# Patient Record
Sex: MALE | Employment: OTHER | URBAN - METROPOLITAN AREA
[De-identification: names, ages, dates, MRNs, and addresses within clinical notes are randomized per-mention and may not be internally consistent; named-entity substitution may affect disease eponyms.]

---

## 2017-06-30 ENCOUNTER — APPOINTMENT (OUTPATIENT)
Dept: CT IMAGING | Age: 77
DRG: 871 | End: 2017-06-30
Attending: EMERGENCY MEDICINE
Payer: MEDICARE

## 2017-06-30 ENCOUNTER — HOSPITAL ENCOUNTER (EMERGENCY)
Age: 77
Discharge: HOME | DRG: 871 | End: 2017-07-01
Attending: EMERGENCY MEDICINE
Payer: MEDICARE

## 2017-06-30 DIAGNOSIS — K59.01 SLOW TRANSIT CONSTIPATION: Primary | ICD-10-CM

## 2017-06-30 LAB
ALBUMIN SERPL BCP-MCNC: 3.7 G/DL (ref 3.5–5)
ALBUMIN/GLOB SERPL: 1.1 {RATIO} (ref 1.1–2.2)
ALP SERPL-CCNC: 82 U/L (ref 45–117)
ALT SERPL-CCNC: 19 U/L (ref 12–78)
ANION GAP BLD CALC-SCNC: 11 MMOL/L (ref 5–15)
APPEARANCE UR: ABNORMAL
AST SERPL W P-5'-P-CCNC: 21 U/L (ref 15–37)
BACTERIA URNS QL MICRO: ABNORMAL /HPF
BILIRUB SERPL-MCNC: 0.6 MG/DL (ref 0.2–1)
BILIRUB UR QL CFM: NEGATIVE
BUN SERPL-MCNC: 10 MG/DL (ref 6–20)
BUN/CREAT SERPL: 10 (ref 12–20)
CALCIUM SERPL-MCNC: 8.3 MG/DL (ref 8.5–10.1)
CHLORIDE SERPL-SCNC: 97 MMOL/L (ref 97–108)
CO2 SERPL-SCNC: 24 MMOL/L (ref 21–32)
COLOR UR: ABNORMAL
CREAT SERPL-MCNC: 0.97 MG/DL (ref 0.7–1.3)
EPITH CASTS URNS QL MICRO: ABNORMAL /LPF
ERYTHROCYTE [DISTWIDTH] IN BLOOD BY AUTOMATED COUNT: 12.9 % (ref 11.5–14.5)
GLOBULIN SER CALC-MCNC: 3.5 G/DL (ref 2–4)
GLUCOSE SERPL-MCNC: 120 MG/DL (ref 65–100)
GLUCOSE UR STRIP.AUTO-MCNC: NEGATIVE MG/DL
HCT VFR BLD AUTO: 38 % (ref 36.6–50.3)
HGB BLD-MCNC: 13.2 G/DL (ref 12.1–17)
HGB UR QL STRIP: NEGATIVE
KETONES UR QL STRIP.AUTO: 40 MG/DL
LEUKOCYTE ESTERASE UR QL STRIP.AUTO: NEGATIVE
LIPASE SERPL-CCNC: 127 U/L (ref 73–393)
MCH RBC QN AUTO: 33.4 PG (ref 26–34)
MCHC RBC AUTO-ENTMCNC: 34.7 G/DL (ref 30–36.5)
MCV RBC AUTO: 96.2 FL (ref 80–99)
NITRITE UR QL STRIP.AUTO: NEGATIVE
PH UR STRIP: 5.5 [PH] (ref 5–8)
PLATELET # BLD AUTO: 168 K/UL (ref 150–400)
POTASSIUM SERPL-SCNC: 3.1 MMOL/L (ref 3.5–5.1)
PROT SERPL-MCNC: 7.2 G/DL (ref 6.4–8.2)
PROT UR STRIP-MCNC: 30 MG/DL
RBC # BLD AUTO: 3.95 M/UL (ref 4.1–5.7)
RBC #/AREA URNS HPF: ABNORMAL /HPF (ref 0–5)
SODIUM SERPL-SCNC: 132 MMOL/L (ref 136–145)
SP GR UR REFRACTOMETRY: 1.02 (ref 1–1.03)
UA: UC IF INDICATED,UAUC: ABNORMAL
UROBILINOGEN UR QL STRIP.AUTO: 1 EU/DL (ref 0.2–1)
WBC # BLD AUTO: 5.5 K/UL (ref 4.1–11.1)
WBC URNS QL MICRO: ABNORMAL /HPF (ref 0–4)

## 2017-06-30 RX ORDER — MORPHINE SULFATE 4 MG/ML
4 INJECTION, SOLUTION INTRAMUSCULAR; INTRAVENOUS
Status: COMPLETED | OUTPATIENT
Start: 2017-06-30 | End: 2017-06-30

## 2017-06-30 RX ORDER — DILTIAZEM HYDROCHLORIDE 120 MG/1
120 CAPSULE, EXTENDED RELEASE ORAL DAILY
COMMUNITY
End: 2017-07-08

## 2017-06-30 RX ORDER — POTASSIUM CHLORIDE 20 MEQ/1
40 TABLET, EXTENDED RELEASE ORAL
Status: COMPLETED | OUTPATIENT
Start: 2017-06-30 | End: 2017-06-30

## 2017-06-30 RX ORDER — PREGABALIN 100 MG/1
CAPSULE ORAL 2 TIMES DAILY
COMMUNITY
End: 2017-07-19

## 2017-06-30 RX ORDER — SODIUM CHLORIDE 0.9 % (FLUSH) 0.9 %
10 SYRINGE (ML) INJECTION
Status: COMPLETED | OUTPATIENT
Start: 2017-06-30 | End: 2017-07-01

## 2017-06-30 RX ORDER — MAGNESIUM CITRATE
296 SOLUTION, ORAL ORAL
Qty: 1 BOTTLE | Refills: 0 | Status: SHIPPED | OUTPATIENT
Start: 2017-07-01 | End: 2017-07-08

## 2017-06-30 RX ORDER — SODIUM CHLORIDE 9 MG/ML
50 INJECTION, SOLUTION INTRAVENOUS
Status: COMPLETED | OUTPATIENT
Start: 2017-06-30 | End: 2017-07-01

## 2017-06-30 RX ORDER — HYDROCODONE BITARTRATE AND ACETAMINOPHEN 5; 325 MG/1; MG/1
1 TABLET ORAL
Qty: 10 TAB | Refills: 0 | Status: SHIPPED | OUTPATIENT
Start: 2017-06-30

## 2017-06-30 RX ORDER — CIPROFLOXACIN 500 MG/1
500 TABLET ORAL 2 TIMES DAILY
COMMUNITY
End: 2017-07-08

## 2017-06-30 RX ADMIN — MORPHINE SULFATE 4 MG: 4 INJECTION, SOLUTION INTRAMUSCULAR; INTRAVENOUS at 21:53

## 2017-06-30 RX ADMIN — POTASSIUM CHLORIDE 40 MEQ: 1500 TABLET, EXTENDED RELEASE ORAL at 22:42

## 2017-07-01 ENCOUNTER — APPOINTMENT (OUTPATIENT)
Dept: CT IMAGING | Age: 77
DRG: 871 | End: 2017-07-01
Attending: EMERGENCY MEDICINE
Payer: MEDICARE

## 2017-07-01 ENCOUNTER — APPOINTMENT (OUTPATIENT)
Dept: GENERAL RADIOLOGY | Age: 77
DRG: 871 | End: 2017-07-01
Attending: EMERGENCY MEDICINE
Payer: MEDICARE

## 2017-07-01 ENCOUNTER — HOSPITAL ENCOUNTER (INPATIENT)
Age: 77
LOS: 6 days | Discharge: HOME HEALTH | DRG: 871 | End: 2017-07-08
Attending: EMERGENCY MEDICINE | Admitting: INTERNAL MEDICINE
Payer: MEDICARE

## 2017-07-01 VITALS
HEART RATE: 85 BPM | WEIGHT: 197.09 LBS | TEMPERATURE: 97.7 F | DIASTOLIC BLOOD PRESSURE: 65 MMHG | BODY MASS INDEX: 26.7 KG/M2 | OXYGEN SATURATION: 94 % | SYSTOLIC BLOOD PRESSURE: 151 MMHG | HEIGHT: 72 IN | RESPIRATION RATE: 27 BRPM

## 2017-07-01 DIAGNOSIS — K56.2 VOLVULUS (HCC): Primary | ICD-10-CM

## 2017-07-01 DIAGNOSIS — E87.6 HYPOKALEMIA: ICD-10-CM

## 2017-07-01 DIAGNOSIS — J96.01 ACUTE RESPIRATORY FAILURE WITH HYPOXIA (HCC): ICD-10-CM

## 2017-07-01 LAB
ALBUMIN SERPL BCP-MCNC: 3.7 G/DL (ref 3.5–5)
ALBUMIN/GLOB SERPL: 1 {RATIO} (ref 1.1–2.2)
ALP SERPL-CCNC: 88 U/L (ref 45–117)
ALT SERPL-CCNC: 21 U/L (ref 12–78)
ANION GAP BLD CALC-SCNC: 11 MMOL/L (ref 5–15)
AST SERPL W P-5'-P-CCNC: 35 U/L (ref 15–37)
ATRIAL RATE: 86 BPM
BASOPHILS # BLD AUTO: 0 K/UL (ref 0–0.1)
BASOPHILS # BLD: 0 % (ref 0–1)
BILIRUB SERPL-MCNC: 0.7 MG/DL (ref 0.2–1)
BUN SERPL-MCNC: 18 MG/DL (ref 6–20)
BUN/CREAT SERPL: 16 (ref 12–20)
CALCIUM SERPL-MCNC: 8.3 MG/DL (ref 8.5–10.1)
CALCULATED P AXIS, ECG09: 86 DEGREES
CALCULATED R AXIS, ECG10: 7 DEGREES
CALCULATED T AXIS, ECG11: 121 DEGREES
CHLORIDE SERPL-SCNC: 101 MMOL/L (ref 97–108)
CO2 SERPL-SCNC: 23 MMOL/L (ref 21–32)
CREAT SERPL-MCNC: 1.11 MG/DL (ref 0.7–1.3)
DIAGNOSIS, 93000: NORMAL
EOSINOPHIL # BLD: 0 K/UL (ref 0–0.4)
EOSINOPHIL NFR BLD: 0 % (ref 0–7)
ERYTHROCYTE [DISTWIDTH] IN BLOOD BY AUTOMATED COUNT: 13.1 % (ref 11.5–14.5)
GLOBULIN SER CALC-MCNC: 3.6 G/DL (ref 2–4)
GLUCOSE SERPL-MCNC: 180 MG/DL (ref 65–100)
HCT VFR BLD AUTO: 40.8 % (ref 36.6–50.3)
HGB BLD-MCNC: 14.4 G/DL (ref 12.1–17)
LACTATE SERPL-SCNC: 2.4 MMOL/L (ref 0.4–2)
LIPASE SERPL-CCNC: 81 U/L (ref 73–393)
LYMPHOCYTES # BLD AUTO: 6 % (ref 12–49)
LYMPHOCYTES # BLD: 1 K/UL (ref 0.8–3.5)
MCH RBC QN AUTO: 33.6 PG (ref 26–34)
MCHC RBC AUTO-ENTMCNC: 35.3 G/DL (ref 30–36.5)
MCV RBC AUTO: 95.3 FL (ref 80–99)
MONOCYTES # BLD: 0.9 K/UL (ref 0–1)
MONOCYTES NFR BLD AUTO: 6 % (ref 5–13)
NEUTS SEG # BLD: 14.1 K/UL (ref 1.8–8)
NEUTS SEG NFR BLD AUTO: 88 % (ref 32–75)
P-R INTERVAL, ECG05: 218 MS
PLATELET # BLD AUTO: 232 K/UL (ref 150–400)
POTASSIUM SERPL-SCNC: 2.8 MMOL/L (ref 3.5–5.1)
PROT SERPL-MCNC: 7.3 G/DL (ref 6.4–8.2)
Q-T INTERVAL, ECG07: 434 MS
QRS DURATION, ECG06: 160 MS
QTC CALCULATION (BEZET), ECG08: 519 MS
RBC # BLD AUTO: 4.28 M/UL (ref 4.1–5.7)
SODIUM SERPL-SCNC: 135 MMOL/L (ref 136–145)
VENTRICULAR RATE, ECG03: 86 BPM
WBC # BLD AUTO: 16 K/UL (ref 4.1–11.1)

## 2017-07-01 PROCEDURE — 77030008683 HC TU ET CUF COVD -A

## 2017-07-01 PROCEDURE — 77030005514 HC CATH URETH FOL14 BARD -A

## 2017-07-01 PROCEDURE — 87040 BLOOD CULTURE FOR BACTERIA: CPT | Performed by: EMERGENCY MEDICINE

## 2017-07-01 PROCEDURE — 36415 COLL VENOUS BLD VENIPUNCTURE: CPT | Performed by: EMERGENCY MEDICINE

## 2017-07-01 PROCEDURE — 74022 RADEX COMPL AQT ABD SERIES: CPT

## 2017-07-01 PROCEDURE — 74177 CT ABD & PELVIS W/CONTRAST: CPT

## 2017-07-01 PROCEDURE — 96367 TX/PROPH/DG ADDL SEQ IV INF: CPT

## 2017-07-01 PROCEDURE — 83605 ASSAY OF LACTIC ACID: CPT | Performed by: EMERGENCY MEDICINE

## 2017-07-01 PROCEDURE — 51702 INSERT TEMP BLADDER CATH: CPT

## 2017-07-01 PROCEDURE — 74011250636 HC RX REV CODE- 250/636: Performed by: EMERGENCY MEDICINE

## 2017-07-01 PROCEDURE — 83690 ASSAY OF LIPASE: CPT | Performed by: EMERGENCY MEDICINE

## 2017-07-01 PROCEDURE — 83735 ASSAY OF MAGNESIUM: CPT | Performed by: EMERGENCY MEDICINE

## 2017-07-01 PROCEDURE — 96365 THER/PROPH/DIAG IV INF INIT: CPT

## 2017-07-01 PROCEDURE — 96361 HYDRATE IV INFUSION ADD-ON: CPT

## 2017-07-01 PROCEDURE — 99285 EMERGENCY DEPT VISIT HI MDM: CPT

## 2017-07-01 PROCEDURE — 77030008768 HC TU NG VYGC -A

## 2017-07-01 PROCEDURE — 93005 ELECTROCARDIOGRAM TRACING: CPT

## 2017-07-01 PROCEDURE — 85025 COMPLETE CBC W/AUTO DIFF WBC: CPT | Performed by: EMERGENCY MEDICINE

## 2017-07-01 PROCEDURE — 74011636320 HC RX REV CODE- 636/320: Performed by: EMERGENCY MEDICINE

## 2017-07-01 PROCEDURE — 80053 COMPREHEN METABOLIC PANEL: CPT | Performed by: EMERGENCY MEDICINE

## 2017-07-01 PROCEDURE — 31500 INSERT EMERGENCY AIRWAY: CPT

## 2017-07-01 RX ORDER — SODIUM CHLORIDE 9 MG/ML
50 INJECTION, SOLUTION INTRAVENOUS
Status: COMPLETED | OUTPATIENT
Start: 2017-07-01 | End: 2017-07-02

## 2017-07-01 RX ORDER — MORPHINE SULFATE 4 MG/ML
INJECTION, SOLUTION INTRAMUSCULAR; INTRAVENOUS
Status: COMPLETED
Start: 2017-07-01 | End: 2017-07-01

## 2017-07-01 RX ORDER — MORPHINE SULFATE 4 MG/ML
4 INJECTION, SOLUTION INTRAMUSCULAR; INTRAVENOUS
Status: DISCONTINUED | OUTPATIENT
Start: 2017-07-01 | End: 2017-07-01 | Stop reason: HOSPADM

## 2017-07-01 RX ORDER — POTASSIUM CHLORIDE 7.45 MG/ML
10 INJECTION INTRAVENOUS
Status: DISPENSED | OUTPATIENT
Start: 2017-07-01 | End: 2017-07-02

## 2017-07-01 RX ORDER — SODIUM CHLORIDE 0.9 % (FLUSH) 0.9 %
10 SYRINGE (ML) INJECTION
Status: COMPLETED | OUTPATIENT
Start: 2017-07-01 | End: 2017-07-01

## 2017-07-01 RX ADMIN — Medication 10 ML: at 00:17

## 2017-07-01 RX ADMIN — SODIUM CHLORIDE 1000 ML: 900 INJECTION, SOLUTION INTRAVENOUS at 22:31

## 2017-07-01 RX ADMIN — SODIUM CHLORIDE 50 ML/HR: 900 INJECTION, SOLUTION INTRAVENOUS at 00:17

## 2017-07-01 RX ADMIN — IOPAMIDOL 100 ML: 755 INJECTION, SOLUTION INTRAVENOUS at 23:21

## 2017-07-01 RX ADMIN — MORPHINE SULFATE 4 MG: 4 INJECTION, SOLUTION INTRAMUSCULAR; INTRAVENOUS at 00:15

## 2017-07-01 RX ADMIN — Medication 10 ML: at 23:21

## 2017-07-01 RX ADMIN — IOPAMIDOL 100 ML: 755 INJECTION, SOLUTION INTRAVENOUS at 00:16

## 2017-07-01 RX ADMIN — POTASSIUM CHLORIDE 10 MEQ: 10 INJECTION, SOLUTION INTRAVENOUS at 23:37

## 2017-07-01 RX ADMIN — SODIUM CHLORIDE 50 ML/HR: 900 INJECTION, SOLUTION INTRAVENOUS at 23:21

## 2017-07-01 RX ADMIN — SODIUM CHLORIDE 1682 ML: 900 INJECTION, SOLUTION INTRAVENOUS at 23:43

## 2017-07-01 NOTE — IP AVS SNAPSHOT
[address] [facility] 
[phone] Patient: Narendra Narayanan MRN: [id number] IB You are allergic to the following Allergen Reactions Augmentin (Amoxicillin-Pot Clavulanate) Rash Ciprofloxacin Rash Recent Documentation Height Weight BMI Smoking Status 1.829 m 88.7 kg 26.52 kg/m2 Never Smoker Emergency Contacts Name Discharge Info Relation Home Work Mobile Abhi Reina [3] [phone] About your hospitalization You were admitted on:  2017 You last received care in the:  [facility] CARDIOPULMONARY CARE You were discharged on:  2017 Unit phone number:  [phone] Why you were hospitalized Your primary diagnosis was:  Sepsis Due To Pneumonia (Hcc) Your diagnoses also included:  Respiratory Failure (Hcc), E-Coli Uti, Sigmoid Volvulus (Hcc), A-Fib (Hcc), Electrolyte Abnormality, Hypertension Providers Seen During Your Hospitalizations Provider Role Specialty Primary office phone Katie Iqbal DO Attending Provider Emergency Medicine [phone] Kaya Tadeo MD Attending Provider Internal Medicine [phone] María Spence MD Attending Provider Internal Medicine [phone]  
 Stephane March MD Attending Provider Hospitalist [phone] Your Primary Care Physician (PCP) Primary Care Physician Office Phone Office Fax Anne Tita [phone] Follow-up Information Follow up With Details Comments Contact Info Juancarlos Vidal MD On 2017 12:45pm  hospital follow-up [address] [place] [address] [address] [place] [address] 
[phone] Will Askew MD On 7/10/2017 8:30am  hospital follow-up Katalina Navdeep[address] [place] [address] 
[phone] [address] [place] [address] [address] 
1st Floor [place] [address] 
[phone] [facility]   [address] [facility] [address] [place] [address] 
[phone] Lane Cedillo NP On 7/17/2017 8:45am  hospital follow-up-Cardiologist [address] [facility] 
[phone] Edmond Soto MD Schedule an appointment as soon as possible for a visit to be seen within 2 weeks [address] [facility] 
[phone] Joseph Grier MD Schedule an appointment as soon as possible for a visit to be seen within 1-2 weeks 1808 [facility] [address] [place] [address] 
[phone] Current Discharge Medication List  
  
START taking these medications Dose & Instructions Dispensing Information Comments Morning Noon Evening Bedtime  
 amiodarone 200 mg tablet Commonly known as:  CORDARONE Your last dose was: Your next dose is:    
   
   
 Dose:  200 mg Take 1 Tab by mouth two (2) times a day for 14 days. Then, starting on 7/23, decrease dose to 200 mg by mouth daily in the morning for 14 more days. (28 days total therapy) Quantity:  42 Tab Refills:  0  
     
   
   
   
  
 benzonatate 100 mg capsule Commonly known as:  TESSALON Your last dose was: Your next dose is:    
   
   
 Dose:  100 mg Take 1 Cap by mouth every eight (8) hours as needed for Cough for up to 7 days. Quantity:  15 Cap Refills:  0  
     
   
   
   
  
 doxycycline 100 mg tablet Commonly known as:  VIBRA-TABS Your last dose was: Your next dose is:    
   
   
 Dose:  100 mg Take 1 Tab by mouth every twelve (12) hours. Quantity:  4 Tab Refills:  0  
     
   
   
   
  
 guaiFENesin-dextromethorphan 100-10 mg/5 mL syrup Commonly known as:  ROBITUSSIN DM Your last dose was: Your next dose is:    
   
   
 Dose:  10 mL Take 10 mL by mouth every six (6) hours as needed for up to 10 days. Quantity:  1 Bottle Refills:  0  
     
   
   
   
  
 l.acidoph-B.lactis-B.longum 460 mg (7.5-6- 1.5 bill. cell) Cap cap Commonly known as:  [id number] Your last dose was: Your next dose is:    
   
   
 Dose:  1 Cap Take 1 Cap by mouth Daily (before breakfast). Quantity:  2 Cap Refills:  0  
     
   
   
   
  
 linaclotide 145 mcg Cap capsule Commonly known as:  Gio Roof Your last dose was: Your next dose is:    
   
   
 Dose:  145 mcg Take 1 Cap by mouth Daily (before breakfast). Quantity:  30 Cap Refills:  0  
     
   
   
   
  
 polyethylene glycol 17 gram packet Commonly known as:  Alecia Royalty Your last dose was: Your next dose is:    
   
   
 Dose:  17 g Take 1 Packet by mouth daily. Quantity:  30 Packet Refills:  0 CONTINUE these medications which have CHANGED Dose & Instructions Dispensing Information Comments Morning Noon Evening Bedtime  
 metoprolol succinate 50 mg XL tablet Commonly known as:  TOPROL-XL What changed:   
- how much to take - when to take this Your last dose was: Your next dose is:    
   
   
 Dose:  25 mg Take 0.5 Tabs by mouth two (2) times a day for 30 days. Quantity:  30 Tab Refills:  2 CONTINUE these medications which have NOT CHANGED Dose & Instructions Dispensing Information Comments Morning Noon Evening Bedtime  
 aspirin 81 mg chewable tablet Your last dose was: Your next dose is:    
   
   
 Dose:  81 mg Take 81 mg by mouth daily. Refills:  0  
     
   
   
   
  
 finasteride 5 mg tablet Commonly known as:  PROSCAR Your last dose was: Your next dose is:    
   
   
 Dose:  5 mg Take 5 mg by mouth daily. Refills:  0  
     
   
   
   
  
 FLOMAX 0.4 mg capsule Generic drug:  tamsulosin Your last dose was:     
   
Your next dose is:    
   
   
 Dose:  0.4 mg  
 Take 0.4 mg by mouth daily. Refills:  0 HYDROcodone-acetaminophen 5-325 mg per tablet Commonly known as:  Buffy Iron Your last dose was: Your next dose is:    
   
   
 Dose:  1 Tab Take 1 Tab by mouth every six (6) hours as needed for Pain. Max Daily Amount: 4 Tabs. Quantity:  10 Tab Refills:  0 LORazepam 1 mg tablet Commonly known as:  ATIVAN Your last dose was: Your next dose is: Take  by mouth every four (4) hours as needed. Refills:  0 LYRICA 100 mg capsule Generic drug:  pregabalin Your last dose was: Your next dose is: Take  by mouth two (2) times a day. Refills:  0 PriLOSEC 20 mg capsule Generic drug:  omeprazole Your last dose was: Your next dose is:    
   
   
 Dose:  20 mg Take 20 mg by mouth daily. Refills:  0 STOP taking these medications   
 amLODIPine 5 mg tablet Commonly known as:  Marin Cordial CIPRO 500 mg tablet Generic drug:  ciprofloxacin HCl  
   
  
 dilTIAZem  mg XR capsule Commonly known as:  DILACOR XR  
   
  
 magnesium citrate solution  
   
  
 predniSONE 20 mg tablet Commonly known as:  Lopez Aver Where to Get Your Medications Information on where to get these meds will be given to you by the nurse or doctor. ! Ask your nurse or doctor about these medications  
  amiodarone 200 mg tablet  
 benzonatate 100 mg capsule  
 doxycycline 100 mg tablet  
 guaiFENesin-dextromethorphan 100-10 mg/5 mL syrup  
 l.acidoph-B.lactis-B.longum 460 mg (7.5-6- 1.5 bill. cell) Cap cap  
 linaclotide 145 mcg Cap capsule  
 metoprolol succinate 50 mg XL tablet  
 polyethylene glycol 17 gram packet Discharge Instructions Patient Discharge Instructions Pt Name  Bijal Eugene Date of Birth 1940 Age  78 y.o. Medical Record Number  [id number] PCP Malaika Rebollar MD   
Admit date:  2017 @    [facility] Room Number  2203/01 Date of Discharge 2017 Admission Diagnoses:     Sepsis due to pneumonia ([facility] Allergies Allergen Reactions  Augmentin [Amoxicillin-Pot Clavulanate] Rash  Ciprofloxacin Rash You were admitted to [address] [place] [address] for  Sepsis due to pneumonia [facility]) YOUR OTHER MEDICAL DIAGNOSES INCLUDE (BUT NOT LIMITED TO ): 
Present on Admission:  Respiratory failure ([facility]  Sepsis due to pneumonia ([facility]  E-coli UTI  Sigmoid volvulus ([facility]  [facility]  Electrolyte abnormality  Hypertension MyChart Activation Thank you for requesting access to Forter. Please follow the instructions below to securely access and download your online medical record. Forter allows you to send messages to your doctor, view your test results, renew your prescriptions, schedule appointments, and more. How Do I Sign Up? 1. In your internet browser, go to [email or web address] 
2. Click on the First Time User? Click Here link in the Sign In box. You will be redirect to the New Member Sign Up page. 3. Enter your Forter Access Code exactly as it appears below. You will not need to use this code after youve completed the sign-up process. If you do not sign up before the expiration date, you must request a new code. Forter Access Code: [email or web address] Expires: 2017 11:57 PM (This is the date your Forter access code will ) 4. Enter the last four digits of your Social Security Number (xxxx) and Date of Birth (mm/dd/yyyy) as indicated and click Submit. You will be taken to the next sign-up page. 5. Create a Forter ID. This will be your Forter login ID and cannot be changed, so think of one that is secure and easy to remember. 6. Create a Forter password. You can change your password at any time. 7. Enter your Password Reset Question and Answer. This can be used at a later time if you forget your password. 8. Enter your e-mail address. You will receive e-mail notification when new information is available in [address] 9. Click Sign Up. You can now view and download portions of your medical record. 10. Click the Download Summary menu link to download a portable copy of your medical information. Additional Information If you have questions, please visit the Frequently Asked Questions section of the [employer] website at [email or web address] Remember, [employer] is NOT to be used for urgent needs. For medical emergencies, dial 911. DIET:  Cardiac Diet Recommended activity: Activity as tolerated Follow up : Follow-up Information Follow up With Details Comments Contact Info Rachel Langford MD On 7/11/2017 12:45pm  [facility] [place] [address] [address] [place] [address] 
[phone] Jorge Alfonso MD On 7/10/2017 8:30am  hospital follow-up Katalina [address] [facility] 
[phone] [address] [place] [address]   [address] 
1st Floor [facility] [address] 
[phone] [facility]   1800 [facility] 3 [facility] [address] 
[phone] Chaya Saunders NP On 7/17/2017 8:45am  hospital follow-up-Cardiologist [address] [facility] 
[phone] Vern Shelton MD Schedule an appointment as soon as possible for a visit to be seen within 2 weeks [address] [facility] 
[phone] Ambreen Ghotra MD Schedule an appointment as soon as possible for a visit to be seen within 1-2 weeks 1808 [facility] [address] [place] [address] 
[phone] · It is important that you take the medication exactly as they are prescribed. · Keep your medication in the bottles provided by the pharmacist and keep a list of the medication names, dosages, and times to be taken in your wallet. · Do not take other medications without consulting your doctor. ADDITIONAL INFORMATION: If you experience any of the following symptoms or have any health problem not listed below, then please call your primary care physician or return to the emergency room if you cannot get hold of your doctor: Fever, chills, nausea, vomiting, diarrhea, change in mentation, falling, bleeding, shortness of breath. I understand that if any problems occur once I am discharged, I am supposed to call my Primary care physician for further care or seek help in the Emergency Department at the nearest Healthcare facility. I have had an opportunity to discuss my clinical issues with my doctor and nursing staff. I understand and acknowledge receipt of the above instructions. Physician's or R.N.'s Signature                                                            Date/Time Patient or Representative Signature                                                 Date/Time Discharge Orders None [facility] Announcement We are excited to announce that we are making your provider's discharge notes available to you in Ciplex. You will see these notes when they are completed and signed by the physician that discharged you from your recent hospital stay.   If you have any questions or concerns about any information you see in Sportskeedat, please call the Health Information Department where you were seen or reach out to your Primary Care Provider for more information about your plan of care. Introducing [facility]! Shirlene Edwards introduces theScore patient portal. Now you can access parts of your medical record, email your doctor's office, and request medication refills online. 1. In your internet browser, go to [email or web address] 2. Click on the First Time User? Click Here link in the Sign In box. You will see the New Member Sign Up page. 3. Enter your theScore Access Code exactly as it appears below. You will not need to use this code after youve completed the sign-up process. If you do not sign up before the expiration date, you must request a new code. · theScore Access Code: [email or web address] Expires: 9/28/2017 11:57 PM 
 
4. Enter the last four digits of your Social Security Number (xxxx) and Date of Birth (mm/dd/yyyy) as indicated and click Submit. You will be taken to the next sign-up page. 5. Create a theScore ID. This will be your theScore login ID and cannot be changed, so think of one that is secure and easy to remember. 6. Create a theScore password. You can change your password at any time. 7. Enter your Password Reset Question and Answer. This can be used at a later time if you forget your password. 8. Enter your e-mail address. You will receive e-mail notification when new information is available in [address] 9. Click Sign Up. You can now view and download portions of your medical record. 10. Click the Download Summary menu link to download a portable copy of your medical information. If you have questions, please visit the Frequently Asked Questions section of the theScore website. Remember, theScore is NOT to be used for urgent needs. For medical emergencies, dial 911. Now available from your iPhone and Android! General Information Please provide this summary of care documentation to your next provider.  
  
  
    
    
 Patient Signature: ____________________________________________________________ Date:  ____________________________________________________________  
  
Michele Matsu Provider Signature:  ____________________________________________________________ Date:  ____________________________________________________________

## 2017-07-01 NOTE — IP AVS SNAPSHOT
Current Discharge Medication List  
  
START taking these medications Dose & Instructions Dispensing Information Comments Morning Noon Evening Bedtime  
 amiodarone 200 mg tablet Commonly known as:  CORDARONE Your last dose was: Your next dose is:    
   
   
 Dose:  200 mg Take 1 Tab by mouth two (2) times a day for 14 days. Then, starting on 7/23, decrease dose to 200 mg by mouth daily in the morning for 14 more days. (28 days total therapy) Quantity:  42 Tab Refills:  0  
     
   
   
   
  
 benzonatate 100 mg capsule Commonly known as:  TESSALON Your last dose was: Your next dose is:    
   
   
 Dose:  100 mg Take 1 Cap by mouth every eight (8) hours as needed for Cough for up to 7 days. Quantity:  15 Cap Refills:  0  
     
   
   
   
  
 doxycycline 100 mg tablet Commonly known as:  VIBRA-TABS Your last dose was: Your next dose is:    
   
   
 Dose:  100 mg Take 1 Tab by mouth every twelve (12) hours. Quantity:  4 Tab Refills:  0  
     
   
   
   
  
 guaiFENesin-dextromethorphan 100-10 mg/5 mL syrup Commonly known as:  ROBITUSSIN DM Your last dose was: Your next dose is:    
   
   
 Dose:  10 mL Take 10 mL by mouth every six (6) hours as needed for up to 10 days. Quantity:  1 Bottle Refills:  0  
     
   
   
   
  
 l.acidoph-B.lactis-B.longum 460 mg (7.5-6- 1.5 bill. cell) Cap cap Commonly known as:  [id number] Your last dose was: Your next dose is:    
   
   
 Dose:  1 Cap Take 1 Cap by mouth Daily (before breakfast). Quantity:  2 Cap Refills:  0  
     
   
   
   
  
 linaclotide 145 mcg Cap capsule Commonly known as:  Marisol Oliver Your last dose was: Your next dose is:    
   
   
 Dose:  145 mcg Take 1 Cap by mouth Daily (before breakfast). Quantity:  30 Cap Refills:  0  
     
   
   
   
  
 polyethylene glycol 17 gram packet Commonly known as:  Ambrose Gonzalez Your last dose was: Your next dose is:    
   
   
 Dose:  17 g Take 1 Packet by mouth daily. Quantity:  30 Packet Refills:  0 CONTINUE these medications which have CHANGED Dose & Instructions Dispensing Information Comments Morning Noon Evening Bedtime  
 metoprolol succinate 50 mg XL tablet Commonly known as:  TOPROL-XL What changed:   
- how much to take - when to take this Your last dose was: Your next dose is:    
   
   
 Dose:  25 mg Take 0.5 Tabs by mouth two (2) times a day for 30 days. Quantity:  30 Tab Refills:  2 CONTINUE these medications which have NOT CHANGED Dose & Instructions Dispensing Information Comments Morning Noon Evening Bedtime  
 aspirin 81 mg chewable tablet Your last dose was: Your next dose is:    
   
   
 Dose:  81 mg Take 81 mg by mouth daily. Refills:  0  
     
   
   
   
  
 finasteride 5 mg tablet Commonly known as:  PROSCAR Your last dose was: Your next dose is:    
   
   
 Dose:  5 mg Take 5 mg by mouth daily. Refills:  0  
     
   
   
   
  
 FLOMAX 0.4 mg capsule Generic drug:  tamsulosin Your last dose was: Your next dose is:    
   
   
 Dose:  0.4 mg Take 0.4 mg by mouth daily. Refills:  0 HYDROcodone-acetaminophen 5-325 mg per tablet Commonly known as:  Fredrica Tolentino Your last dose was: Your next dose is:    
   
   
 Dose:  1 Tab Take 1 Tab by mouth every six (6) hours as needed for Pain. Max Daily Amount: 4 Tabs. Quantity:  10 Tab Refills:  0 LORazepam 1 mg tablet Commonly known as:  ATIVAN Your last dose was: Your next dose is: Take  by mouth every four (4) hours as needed. Refills:  0 LYRICA 100 mg capsule Generic drug:  pregabalin Your last dose was: Your next dose is: Take  by mouth two (2) times a day. Refills:  0 PriLOSEC 20 mg capsule Generic drug:  omeprazole Your last dose was: Your next dose is:    
   
   
 Dose:  20 mg Take 20 mg by mouth daily. Refills:  0 STOP taking these medications   
 amLODIPine 5 mg tablet Commonly known as:  Angus Perdue CIPRO 500 mg tablet Generic drug:  ciprofloxacin HCl  
   
  
 dilTIAZem  mg XR capsule Commonly known as:  DILACOR XR  
   
  
 magnesium citrate solution  
   
  
 predniSONE 20 mg tablet Commonly known as:  Serjio Resendiz Where to Get Your Medications Information on where to get these meds will be given to you by the nurse or doctor. ! Ask your nurse or doctor about these medications  
  amiodarone 200 mg tablet  
 benzonatate 100 mg capsule  
 doxycycline 100 mg tablet  
 guaiFENesin-dextromethorphan 100-10 mg/5 mL syrup  
 l.acidoph-B.lactis-B.longum 460 mg (7.5-6- 1.5 bill. cell) Cap cap  
 linaclotide 145 mcg Cap capsule  
 metoprolol succinate 50 mg XL tablet  
 polyethylene glycol 17 gram packet

## 2017-07-01 NOTE — ED NOTES
Care assumed of patient @ this time & intro with rounding done, with report from [facility] JOSE EDUARDO Zhao_________________. Patient resting quietly on stretcher without verbal complaints.

## 2017-07-01 NOTE — ED NOTES
Pt arrives to the ED with c/c of abdominal pain, onset last night. Pt reports last BM today consisting of small streaks. Pt denies any N/V/D. Pts abdomen appears to be firm and distended. Pt also reports SOB and does appear to be dyspneic when resting. Pt alert, oriented X4, able to follow commands and ambulate with assistance. Monitor X3, side rails X2 and call bell within reach.

## 2017-07-01 NOTE — DISCHARGE INSTRUCTIONS
You were seen for decreased bowel movements and abdominal pain, most likely due to constipation. For the constipation, drink plenty of fluids, use Colace stool softener with Dulcolax or Miralax twice a day laxative until bowel movement occurs. If still no bowel movement, can buy Magnesium Citrate over the counter for a whole bowel cleanse. Maintain a high fiber diet by eating green vegetables and fruit and drink 6-8 large glasses of water daily to avoid constipation in the future. Metamucil, 1 tablespoon once or twice daily can be used to keep bowels regular if needed. Timing elimination to occur after meals, or after a hot drink, can also improve the situation long term. Call if symptoms persist or worsen. Constipation: Care Instructions  Your Care Instructions  Constipation means that you have a hard time passing stools (bowel movements). People pass stools from 3 times a day to once every 3 days. What is normal for you may be different. Constipation may occur with pain in the rectum and cramping. The pain may get worse when you try to pass stools. Sometimes there are small amounts of bright red blood on toilet paper or the surface of stools. This is because of enlarged veins near the rectum (hemorrhoids). A few changes in your diet and lifestyle may help you avoid ongoing constipation. Your doctor may also prescribe medicine to help loosen your stool. Some medicines can cause constipation. These include pain medicines and antidepressants. Tell your doctor about all the medicines you take. Your doctor may want to make a medicine change to ease your symptoms. Follow-up care is a key part of your treatment and safety. Be sure to make and go to all appointments, and call your doctor if you are having problems. It's also a good idea to know your test results and keep a list of the medicines you take. How can you care for yourself at home?   · Drink plenty of fluids, enough so that your urine is light yellow or clear like water. If you have kidney, heart, or liver disease and have to limit fluids, talk with your doctor before you increase the amount of fluids you drink. · Include high-fiber foods in your diet each day. These include fruits, vegetables, beans, and whole grains. · Get at least 30 minutes of exercise on most days of the week. Walking is a good choice. You also may want to do other activities, such as running, swimming, cycling, or playing tennis or team sports. · Take a fiber supplement, such as Citrucel or Metamucil, every day. Read and follow all instructions on the label. · Schedule time each day for a bowel movement. A daily routine may help. Take your time having your bowel movement. · Support your feet with a small step stool when you sit on the toilet. This helps flex your hips and places your pelvis in a squatting position. · Your doctor may recommend an over-the-counter laxative to relieve your constipation. Examples are Milk of Magnesia and MiraLax. Read and follow all instructions on the label. Do not use laxatives on a long-term basis. When should you call for help? Call your doctor now or seek immediate medical care if:  · You have new or worse belly pain. · You have new or worse nausea or vomiting. · You have blood in your stools. Watch closely for changes in your health, and be sure to contact your doctor if:  · Your constipation is getting worse. · You do not get better as expected. Where can you learn more? Go to http://ariane-nadira.info/. Enter 21 [phone] in the search box to learn more about \"Constipation: Care Instructions. \"  Current as of: March 20, 2017  Content Version: 11.3  © 0955-8217 [employer]. Care instructions adapted under license by [employer] (which disclaims liability or warranty for this information).  If you have questions about a medical condition or this instruction, always ask your healthcare professional. [employer] disclaims any warranty or liability for your use of this information.

## 2017-07-01 NOTE — ED NOTES
1030: Pts heart rate 100-120. Pt was standing using the urinal. Pts rhythm showed vent rhythm on monitor. Pt has a pacemaker. Ordered EKG and notified MD.  [phone]: Rounded and updated pt and family on plan of care, pt will be going to CT for scan.

## 2017-07-01 NOTE — ED NOTES
Bedside and Verbal shift change report given to Ariana Chung RN (oncoming nurse) by Van Ryder RN (offgoing nurse). Report included the following information SBAR, Kardex and ED Summary.

## 2017-07-01 NOTE — ED NOTES
___Lizzie_____________________ in to talk with patient and explain plan of care with  understanding and  written & verbal instructions

## 2017-07-01 NOTE — ED PROVIDER NOTES
HPI Comments: Nicole Pryor is a 78 y.o. male, pmhx significant for CKD, and pacemaker, who presents ambulatory with wife to the ED c/o sharp 8/10 lower abdominal pain x last night. Pt states that he felt worsening pain since this afternoon. Pt notes associated abdominal distention, and nausea. He states that he has not had a \"good\" bowel movement \"in a long time. \" Pt also states that he has not been passing gas. Pt denies radiation of pain to his back. Pt denies h/o diverticulosis, stating that he has had unremarkable colonoscopies. Pt denies dysuria, hematuria, fever, chills, vomiting, and diarrhea. PCP: Willy Finney MD    PSHx: Significant for orthopaedic (hip replacement)  Social Hx: (-) tobacco, (-) EtOH,  (-) Illicit Drugs    There are no other complaints, changes, or physical findings at this time. The history is provided by the patient. No  was used. Past Medical History:   Diagnosis Date    Chronic kidney disease     Pacemaker 2013       Past Surgical History:   Procedure Laterality Date    HX ORTHOPAEDIC      hip replacement, knee, back         History reviewed. No pertinent family history. Social History     Social History    Marital status:      Spouse name: N/A    Number of children: N/A    Years of education: N/A     Occupational History    Not on file. Social History Main Topics    Smoking status: Never Smoker    Smokeless tobacco: Never Used    Alcohol use Yes      Comment: occationally    Drug use: No    Sexual activity: Not on file     Other Topics Concern    Not on file     Social History Narrative         ALLERGIES: Augmentin [amoxicillin-pot clavulanate] and Ciprofloxacin    Review of Systems   Constitutional: Negative for chills and fever. Respiratory: Negative for cough and shortness of breath. Cardiovascular: Negative for chest pain.    Gastrointestinal: Positive for abdominal distention, abdominal pain, constipation and nausea. Negative for diarrhea and vomiting. Genitourinary: Negative for dysuria and hematuria. Neurological: Negative for weakness and numbness. All other systems reviewed and are negative. Patient Vitals for the past 12 hrs:   Temp Pulse Resp BP SpO2   07/01/17 0001 - 85 27 151/65 94 %   06/30/17 2330 - 87 26 163/67 96 %   06/30/17 2321 - 89 26 156/63 94 %   06/30/17 2225 - - - - 93 %   06/30/17 2145 - 85 24 161/70 96 %   06/30/17 2140 - - - - 97 %   06/30/17 2139 - - - 161/72 -   06/30/17 2044 97.7 °F (36.5 °C) 94 22 129/86 97 %       Physical Exam   Constitutional: He is oriented to person, place, and time. He appears well-developed and well-nourished. HENT:   Head: Normocephalic and atraumatic. Eyes: Conjunctivae and EOM are normal.   Neck: Normal range of motion. Neck supple. Cardiovascular: Normal rate and regular rhythm. Pulmonary/Chest: Effort normal and breath sounds normal. No respiratory distress. Abdominal: Soft. He exhibits distension. There is tenderness. There is no rebound. Tenderness in RLQ and minimal tenderness in LLQ  Abdomen distended, but soft   Musculoskeletal: Normal range of motion. Neurological: He is alert and oriented to person, place, and time. Skin: Skin is warm and dry. Psychiatric: He has a normal mood and affect. Nursing note and vitals reviewed. MDM  Number of Diagnoses or Management Options  Slow transit constipation:   Diagnosis management comments: Patient presents with abdominal pain. DDx: Gastroenteritis, SBO, constipation, appendicitis, colitis, IBD, diverticulitis, mesenteric ischemia, AAA or descending dissection, ACS, ureteral stone. Will get labs and CT Abdomen.         Amount and/or Complexity of Data Reviewed  Clinical lab tests: ordered and reviewed  Tests in the radiology section of CPT®: ordered and reviewed  Tests in the medicine section of CPT®: reviewed and ordered  Review and summarize past medical records: yes  Independent visualization of images, tracings, or specimens: yes    Patient Progress  Patient progress: stable    ED Course       Procedures    EKG interpretation: (Preliminary) 10:34 PM  Rhythm: normal sinus rhythm and 1st degree block; and regular . Rate (approx.): 86; Axis: normal; NV interval: normal; QRS interval: normal ; ST/T wave: normal  Written by Iris Jose ED Scribe, as dictated by Pio Tadeo M.D. Progress Note:  11:57 PM  Discussed workup results/findings, and counseled pt regarding his diagnosis. Pt has been encouraged to follow-up as instructed. All questions have been answered, and pt conveyed understanding. Written by Iris Jose ED Scribe, as dictated by Pio Tadeo M.D.    LABORATORY TESTS:  Recent Results (from the past 12 hour(s))   CBC W/O DIFF    Collection Time: 06/30/17  9:06 PM   Result Value Ref Range    WBC 5.5 4.1 - 11.1 K/uL    RBC 3.95 (L) 4.10 - 5.70 M/uL    HGB 13.2 12.1 - 17.0 g/dL    HCT 38.0 36.6 - 50.3 %    MCV 96.2 80.0 - 99.0 FL    MCH 33.4 26.0 - 34.0 PG    MCHC 34.7 30.0 - 36.5 g/dL    RDW 12.9 11.5 - 14.5 %    PLATELET 650 501 - 917 K/uL   METABOLIC PANEL, COMPREHENSIVE    Collection Time: 06/30/17  9:06 PM   Result Value Ref Range    Sodium 132 (L) 136 - 145 mmol/L    Potassium 3.1 (L) 3.5 - 5.1 mmol/L    Chloride 97 97 - 108 mmol/L    CO2 24 21 - 32 mmol/L    Anion gap 11 5 - 15 mmol/L    Glucose 120 (H) 65 - 100 mg/dL    BUN 10 6 - 20 MG/DL    Creatinine 0.97 0.70 - 1.30 MG/DL    BUN/Creatinine ratio 10 (L) 12 - 20      GFR est AA >60 >60 ml/min/1.73m2    GFR est non-AA >60 >60 ml/min/1.73m2    Calcium 8.3 (L) 8.5 - 10.1 MG/DL    Bilirubin, total 0.6 0.2 - 1.0 MG/DL    ALT (SGPT) 19 12 - 78 U/L    AST (SGOT) 21 15 - 37 U/L    Alk.  phosphatase 82 45 - 117 U/L    Protein, total 7.2 6.4 - 8.2 g/dL    Albumin 3.7 3.5 - 5.0 g/dL    Globulin 3.5 2.0 - 4.0 g/dL    A-G Ratio 1.1 1.1 - 2.2     LIPASE    Collection Time: 06/30/17  9:06 PM   Result Value Ref Range    Lipase 127 73 - 393 U/L   URINALYSIS W/ REFLEX CULTURE    Collection Time: 06/30/17 10:32 PM   Result Value Ref Range    Color YELLOW/STRAW      Appearance CLOUDY (A) CLEAR      Specific gravity 1.022 1.003 - 1.030      pH (UA) 5.5 5.0 - 8.0      Protein 30 (A) NEG mg/dL    Glucose NEGATIVE  NEG mg/dL    Ketone 40 (A) NEG mg/dL    Blood NEGATIVE  NEG      Urobilinogen 1.0 0.2 - 1.0 EU/dL    Nitrites NEGATIVE  NEG      Leukocyte Esterase NEGATIVE  NEG      WBC 5-10 0 - 4 /hpf    RBC 5-10 0 - 5 /hpf    Epithelial cells FEW FEW /lpf    Bacteria 1+ (A) NEG /hpf    UA:UC IF INDICATED URINE CULTURE ORDERED (A) CNI     BILIRUBIN, CONFIRM    Collection Time: 06/30/17 10:32 PM   Result Value Ref Range    Bilirubin UA, confirm NEGATIVE  NEG     EKG, 12 LEAD, INITIAL    Collection Time: 06/30/17 10:34 PM   Result Value Ref Range    Ventricular Rate 86 BPM    Atrial Rate 86 BPM    P-R Interval 218 ms    QRS Duration 160 ms    Q-T Interval 434 ms    QTC Calculation (Bezet) 519 ms    Calculated P Axis 86 degrees    Calculated R Axis 7 degrees    Calculated T Axis 121 degrees    Diagnosis       Sinus rhythm with 1st degree AV block  Left bundle branch block  When compared with ECG of 05-NOV-2016 10:07,  LA interval has increased         IMAGING RESULTS:  CT ABD PELV W CONT   Final Result     EXAM:  CT ABD PELV W CONT     INDICATION: abd distension, decreased BM and low abd pain     COMPARISON: 2010     CONTRAST:  100 mL of Isovue-370.     TECHNIQUE:   Following the uneventful intravenous administration of 100 cc Isovue-370, thin  axial images were obtained through the abdomen and pelvis. Coronal and sagittal  reconstructions were generated. Oral contrast water was administered. CT dose  reduction was achieved through use of a standardized protocol tailored for this  examination and automatic exposure control for dose modulation.     FINDINGS:   Liver and spleen are normal in size.  Pancreas and gallbladder appear  unremarkable. Kidneys are unobstructed. There is severe colon distention with  fecal stasis. There is no significant small bowel distention. There is no  evidence to suggest obstruction. There is no free air or free fluid. Major  vessels appear patent. No wall thickening.     IMPRESSION   impression: Severe colon distention with fecal stasis up. MEDICATIONS GIVEN:  Medications   morphine injection 4 mg (not administered)   morphine injection 4 mg (4 mg IntraVENous Given 6/30/17 2153)   potassium chloride (K-DUR, KLOR-CON) SR tablet 40 mEq (40 mEq Oral Given 6/30/17 2242)   0.9% sodium chloride infusion (50 mL/hr IntraVENous New Bag 7/1/17 0017)   iopamidol (ISOVUE-370) 76 % injection 100 mL (100 mL IntraVENous Given 7/1/17 0016)   sodium chloride (NS) flush 10 mL (10 mL IntraVENous Given 7/1/17 0017)   morphine 4 mg/mL injection (4 mg  Given 7/1/17 0015)       IMPRESSION:  1. Slow transit constipation        PLAN:  1. Discharge home. Discharge Medication List as of 6/30/2017 11:57 PM      START taking these medications    Details   magnesium citrate solution Take 296 mL by mouth now for 1 dose., Normal, Disp-1 Bottle, R-0      HYDROcodone-acetaminophen (NORCO) 5-325 mg per tablet Take 1 Tab by mouth every six (6) hours as needed for Pain. Max Daily Amount: 4 Tabs., Print, Disp-10 Tab, R-0         CONTINUE these medications which have NOT CHANGED    Details   ciprofloxacin HCl (CIPRO) 500 mg tablet Take 500 mg by mouth two (2) times a day., Historical Med      dilTIAZem XR (DILACOR XR) 120 mg XR capsule Take 120 mg by mouth daily. , Historical Med      pregabalin (LYRICA) 100 mg capsule Take  by mouth two (2) times a day., Historical Med      predniSONE (DELTASONE) 20 mg tablet Take 30 mg by mouth daily (with breakfast). , Historical Med      metoprolol succinate (TOPROL-XL) 50 mg XL tablet Take 1 Tab by mouth daily. , Print, Disp-30 Tab, R-1      omeprazole (PRILOSEC) 20 mg capsule Take 20 mg by mouth daily.  , Historical Med      LORazepam (ATIVAN) 1 mg tablet Take  by mouth every four (4) hours as needed.  , Historical Med      tamsulosin (FLOMAX) 0.4 mg capsule Take 0.4 mg by mouth daily. , Historical Med      finasteride (PROSCAR) 5 mg tablet Take 5 mg by mouth daily. , Historical Med      amLODIPine (NORVASC) 5 mg tablet Take 5 mg by mouth daily. , Historical Med           2. Follow-up Information     Follow up With Details Comments Contact Info    Desean Schafer MD Schedule an appointment as soon as possible for a visit  Katalina Rhoades  [phone]          3. Return to ED if worse       DISCHARGE NOTE:  11:57 PM  Patient's results have been reviewed with them. Patient and/or family have verbally conveyed their understanding and agreement of the patient's signs, symptoms, diagnosis, treatment and prognosis and additionally agree to follow up as recommended or return to the Emergency Room should their condition change prior to follow-up. Discharge instructions have also been provided to the patient with some educational information regarding their diagnosis as well a list of reasons why they would want to return to the ER prior to their follow-up appointment should their condition change. This note is prepared by Mariann Hull, acting as Scribe for MONI Ramirez M.D: The scribe's documentation has been prepared under my direction and personally reviewed by me in its entirety. I confirm that the note above accurately reflects all work, treatment, procedures, and medical decision making performed by me.

## 2017-07-02 ENCOUNTER — APPOINTMENT (OUTPATIENT)
Dept: GENERAL RADIOLOGY | Age: 77
DRG: 871 | End: 2017-07-02
Attending: EMERGENCY MEDICINE
Payer: MEDICARE

## 2017-07-02 ENCOUNTER — APPOINTMENT (OUTPATIENT)
Dept: GENERAL RADIOLOGY | Age: 77
DRG: 871 | End: 2017-07-02
Attending: INTERNAL MEDICINE
Payer: MEDICARE

## 2017-07-02 PROBLEM — J96.90 RESPIRATORY FAILURE (HCC): Status: ACTIVE | Noted: 2017-07-02

## 2017-07-02 LAB
ANION GAP BLD CALC-SCNC: 8 MMOL/L (ref 5–15)
ANION GAP BLD CALC-SCNC: 9 MMOL/L (ref 5–15)
APPEARANCE UR: CLEAR
APPEARANCE UR: CLEAR
APTT PPP: 30.1 SEC (ref 22.1–32.5)
ARTERIAL PATENCY WRIST A: YES
ARTERIAL PATENCY WRIST A: YES
ATRIAL RATE: 117 BPM
BACTERIA URNS QL MICRO: NEGATIVE /HPF
BACTERIA URNS QL MICRO: NEGATIVE /HPF
BASE DEFICIT BLDA-SCNC: 1.3 MMOL/L
BASE DEFICIT BLDA-SCNC: 4.5 MMOL/L
BDY SITE: ABNORMAL
BDY SITE: NORMAL
BILIRUB UR QL CFM: NEGATIVE
BILIRUB UR QL CFM: NEGATIVE
BREATHS.SPONTANEOUS ON VENT: 12
BUN SERPL-MCNC: 15 MG/DL (ref 6–20)
BUN SERPL-MCNC: 19 MG/DL (ref 6–20)
BUN/CREAT SERPL: 20 (ref 12–20)
BUN/CREAT SERPL: 20 (ref 12–20)
CALCIUM SERPL-MCNC: 6.8 MG/DL (ref 8.5–10.1)
CALCIUM SERPL-MCNC: 7.4 MG/DL (ref 8.5–10.1)
CALCULATED P AXIS, ECG09: 10 DEGREES
CALCULATED R AXIS, ECG10: -3 DEGREES
CALCULATED T AXIS, ECG11: 118 DEGREES
CHLORIDE SERPL-SCNC: 107 MMOL/L (ref 97–108)
CHLORIDE SERPL-SCNC: 108 MMOL/L (ref 97–108)
CO2 SERPL-SCNC: 23 MMOL/L (ref 21–32)
CO2 SERPL-SCNC: 23 MMOL/L (ref 21–32)
COLOR UR: ABNORMAL
COLOR UR: ABNORMAL
CREAT SERPL-MCNC: 0.75 MG/DL (ref 0.7–1.3)
CREAT SERPL-MCNC: 0.93 MG/DL (ref 0.7–1.3)
DIAGNOSIS, 93000: NORMAL
EPAP/CPAP/PEEP, PAPEEP: 5
EPAP/CPAP/PEEP, PAPEEP: 6
EPITH CASTS URNS QL MICRO: ABNORMAL /LPF
EPITH CASTS URNS QL MICRO: ABNORMAL /LPF
ERYTHROCYTE [DISTWIDTH] IN BLOOD BY AUTOMATED COUNT: 13.3 % (ref 11.5–14.5)
FIO2 ON VENT: 50 %
FIO2 ON VENT: 70 %
GAS FLOW.O2 SETTING OXYMISER: 12 L/MIN
GAS FLOW.O2 SETTING OXYMISER: 14 L/MIN
GLUCOSE SERPL-MCNC: 142 MG/DL (ref 65–100)
GLUCOSE SERPL-MCNC: 91 MG/DL (ref 65–100)
GLUCOSE UR STRIP.AUTO-MCNC: NEGATIVE MG/DL
GLUCOSE UR STRIP.AUTO-MCNC: NEGATIVE MG/DL
HCO3 BLDA-SCNC: 23 MMOL/L (ref 22–26)
HCO3 BLDA-SCNC: 24 MMOL/L (ref 22–26)
HCT VFR BLD AUTO: 35.4 % (ref 36.6–50.3)
HGB BLD-MCNC: 12 G/DL (ref 12.1–17)
HGB UR QL STRIP: ABNORMAL
HGB UR QL STRIP: ABNORMAL
HYALINE CASTS URNS QL MICRO: ABNORMAL /LPF (ref 0–5)
INR PPP: 1.3 (ref 0.9–1.1)
KETONES UR QL STRIP.AUTO: 15 MG/DL
KETONES UR QL STRIP.AUTO: ABNORMAL MG/DL
LACTATE SERPL-SCNC: 0.6 MMOL/L (ref 0.4–2)
LACTATE SERPL-SCNC: NORMAL MMOL/L (ref 0.4–2)
LEUKOCYTE ESTERASE UR QL STRIP.AUTO: NEGATIVE
LEUKOCYTE ESTERASE UR QL STRIP.AUTO: NEGATIVE
MAGNESIUM SERPL-MCNC: 3.2 MG/DL (ref 1.6–2.4)
MCH RBC QN AUTO: 32.5 PG (ref 26–34)
MCHC RBC AUTO-ENTMCNC: 33.9 G/DL (ref 30–36.5)
MCV RBC AUTO: 95.9 FL (ref 80–99)
NITRITE UR QL STRIP.AUTO: NEGATIVE
NITRITE UR QL STRIP.AUTO: NEGATIVE
OTHER,OTHU: ABNORMAL
P-R INTERVAL, ECG05: 136 MS
PCO2 BLDA: 44 MMHG (ref 35–45)
PCO2 BLDA: 52 MMHG (ref 35–45)
PH BLDA: 7.27 [PH] (ref 7.35–7.45)
PH BLDA: 7.36 [PH] (ref 7.35–7.45)
PH UR STRIP: 6 [PH] (ref 5–8)
PH UR STRIP: 6 [PH] (ref 5–8)
PLATELET # BLD AUTO: 178 K/UL (ref 150–400)
PO2 BLDA: 123 MMHG (ref 80–100)
PO2 BLDA: 88 MMHG (ref 80–100)
POTASSIUM SERPL-SCNC: 2.7 MMOL/L (ref 3.5–5.1)
POTASSIUM SERPL-SCNC: 3.3 MMOL/L (ref 3.5–5.1)
PROT UR STRIP-MCNC: 100 MG/DL
PROT UR STRIP-MCNC: 100 MG/DL
PROTHROMBIN TIME: 12.7 SEC (ref 9–11.1)
Q-T INTERVAL, ECG07: 408 MS
QRS DURATION, ECG06: 150 MS
QTC CALCULATION (BEZET), ECG08: 569 MS
RBC # BLD AUTO: 3.69 M/UL (ref 4.1–5.7)
RBC #/AREA URNS HPF: ABNORMAL /HPF (ref 0–5)
RBC #/AREA URNS HPF: ABNORMAL /HPF (ref 0–5)
SAO2 % BLD: 96 % (ref 92–97)
SAO2 % BLD: 98 % (ref 92–97)
SAO2% DEVICE SAO2% SENSOR NAME: ABNORMAL
SAO2% DEVICE SAO2% SENSOR NAME: NORMAL
SODIUM SERPL-SCNC: 138 MMOL/L (ref 136–145)
SODIUM SERPL-SCNC: 140 MMOL/L (ref 136–145)
SP GR UR REFRACTOMETRY: 1.02 (ref 1–1.03)
SP GR UR REFRACTOMETRY: 1.02 (ref 1–1.03)
SPECIMEN SITE: ABNORMAL
SPECIMEN SITE: NORMAL
THERAPEUTIC RANGE,PTTT: NORMAL SECS (ref 58–77)
UA: UC IF INDICATED,UAUC: ABNORMAL
UA: UC IF INDICATED,UAUC: ABNORMAL
UROBILINOGEN UR QL STRIP.AUTO: 1 EU/DL (ref 0.2–1)
UROBILINOGEN UR QL STRIP.AUTO: 1 EU/DL (ref 0.2–1)
VENTILATION MODE VENT: ABNORMAL
VENTILATION MODE VENT: NORMAL
VENTRICULAR RATE, ECG03: 117 BPM
VT SETTING VENT: 500 ML
VT SETTING VENT: 550 ML
WBC # BLD AUTO: 13.2 K/UL (ref 4.1–11.1)
WBC URNS QL MICRO: ABNORMAL /HPF (ref 0–4)
WBC URNS QL MICRO: ABNORMAL /HPF (ref 0–4)

## 2017-07-02 PROCEDURE — 36600 WITHDRAWAL OF ARTERIAL BLOOD: CPT | Performed by: EMERGENCY MEDICINE

## 2017-07-02 PROCEDURE — 0D9N80Z DRAINAGE OF SIGMOID COLON WITH DRAINAGE DEVICE, VIA NATURAL OR ARTIFICIAL OPENING ENDOSCOPIC: ICD-10-PCS | Performed by: INTERNAL MEDICINE

## 2017-07-02 PROCEDURE — 31500 INSERT EMERGENCY AIRWAY: CPT

## 2017-07-02 PROCEDURE — 81001 URINALYSIS AUTO W/SCOPE: CPT | Performed by: INTERNAL MEDICINE

## 2017-07-02 PROCEDURE — 36600 WITHDRAWAL OF ARTERIAL BLOOD: CPT | Performed by: INTERNAL MEDICINE

## 2017-07-02 PROCEDURE — 74011000258 HC RX REV CODE- 258: Performed by: EMERGENCY MEDICINE

## 2017-07-02 PROCEDURE — 83605 ASSAY OF LACTIC ACID: CPT | Performed by: INTERNAL MEDICINE

## 2017-07-02 PROCEDURE — 85027 COMPLETE CBC AUTOMATED: CPT | Performed by: INTERNAL MEDICINE

## 2017-07-02 PROCEDURE — 77030010547 HC BG URIN W/UMETER -A

## 2017-07-02 PROCEDURE — 74011250637 HC RX REV CODE- 250/637: Performed by: INTERNAL MEDICINE

## 2017-07-02 PROCEDURE — 74000 XR ABD PORT  1 V: CPT

## 2017-07-02 PROCEDURE — 77030032490 HC SLV COMPR SCD KNE COVD -B

## 2017-07-02 PROCEDURE — 71010 XR CHEST PORT: CPT

## 2017-07-02 PROCEDURE — 74011000250 HC RX REV CODE- 250: Performed by: EMERGENCY MEDICINE

## 2017-07-02 PROCEDURE — 85610 PROTHROMBIN TIME: CPT | Performed by: EMERGENCY MEDICINE

## 2017-07-02 PROCEDURE — 76040000019: Performed by: INTERNAL MEDICINE

## 2017-07-02 PROCEDURE — 71010 XR CHEST SNGL V: CPT

## 2017-07-02 PROCEDURE — 77030010545: Performed by: INTERNAL MEDICINE

## 2017-07-02 PROCEDURE — 74011250636 HC RX REV CODE- 250/636: Performed by: INTERNAL MEDICINE

## 2017-07-02 PROCEDURE — 80048 BASIC METABOLIC PNL TOTAL CA: CPT | Performed by: INTERNAL MEDICINE

## 2017-07-02 PROCEDURE — 82803 BLOOD GASES ANY COMBINATION: CPT | Performed by: EMERGENCY MEDICINE

## 2017-07-02 PROCEDURE — 36415 COLL VENOUS BLD VENIPUNCTURE: CPT | Performed by: INTERNAL MEDICINE

## 2017-07-02 PROCEDURE — 94002 VENT MGMT INPAT INIT DAY: CPT

## 2017-07-02 PROCEDURE — 74011250636 HC RX REV CODE- 250/636: Performed by: EMERGENCY MEDICINE

## 2017-07-02 PROCEDURE — 5A1945Z RESPIRATORY VENTILATION, 24-96 CONSECUTIVE HOURS: ICD-10-PCS | Performed by: INTERNAL MEDICINE

## 2017-07-02 PROCEDURE — 77030037878 HC DRSG MEPILEX >48IN BORD MOLN -B

## 2017-07-02 PROCEDURE — 0D9670Z DRAINAGE OF STOMACH WITH DRAINAGE DEVICE, VIA NATURAL OR ARTIFICIAL OPENING: ICD-10-PCS | Performed by: INTERNAL MEDICINE

## 2017-07-02 PROCEDURE — 83605 ASSAY OF LACTIC ACID: CPT | Performed by: EMERGENCY MEDICINE

## 2017-07-02 PROCEDURE — 81001 URINALYSIS AUTO W/SCOPE: CPT | Performed by: EMERGENCY MEDICINE

## 2017-07-02 PROCEDURE — 74000 XR ABD (KUB): CPT

## 2017-07-02 PROCEDURE — 0BH17EZ INSERTION OF ENDOTRACHEAL AIRWAY INTO TRACHEA, VIA NATURAL OR ARTIFICIAL OPENING: ICD-10-PCS | Performed by: EMERGENCY MEDICINE

## 2017-07-02 PROCEDURE — 77030008634 HC TU COLON DCOMPRS COOK -C: Performed by: INTERNAL MEDICINE

## 2017-07-02 PROCEDURE — 65610000006 HC RM INTENSIVE CARE

## 2017-07-02 PROCEDURE — 85730 THROMBOPLASTIN TIME PARTIAL: CPT | Performed by: EMERGENCY MEDICINE

## 2017-07-02 PROCEDURE — 74011250637 HC RX REV CODE- 250/637: Performed by: EMERGENCY MEDICINE

## 2017-07-02 PROCEDURE — 92950 HEART/LUNG RESUSCITATION CPR: CPT

## 2017-07-02 PROCEDURE — 87070 CULTURE OTHR SPECIMN AEROBIC: CPT | Performed by: INTERNAL MEDICINE

## 2017-07-02 PROCEDURE — 94003 VENT MGMT INPAT SUBQ DAY: CPT

## 2017-07-02 RX ORDER — PROPOFOL 10 MG/ML
5-50 VIAL (ML) INTRAVENOUS
Status: DISCONTINUED | OUTPATIENT
Start: 2017-07-02 | End: 2017-07-03

## 2017-07-02 RX ORDER — ONDANSETRON 2 MG/ML
4 INJECTION INTRAMUSCULAR; INTRAVENOUS
Status: DISCONTINUED | OUTPATIENT
Start: 2017-07-02 | End: 2017-07-08 | Stop reason: HOSPADM

## 2017-07-02 RX ORDER — PIPERACILLIN SODIUM, TAZOBACTAM SODIUM 3; .375 G/15ML; G/15ML
INJECTION, POWDER, LYOPHILIZED, FOR SOLUTION INTRAVENOUS
Status: DISPENSED
Start: 2017-07-02 | End: 2017-07-02

## 2017-07-02 RX ORDER — CHLORHEXIDINE GLUCONATE 1.2 MG/ML
15 RINSE ORAL 2 TIMES DAILY
Status: DISCONTINUED | OUTPATIENT
Start: 2017-07-02 | End: 2017-07-03

## 2017-07-02 RX ORDER — POTASSIUM CHLORIDE 7.45 MG/ML
10 INJECTION INTRAVENOUS
Status: COMPLETED | OUTPATIENT
Start: 2017-07-02 | End: 2017-07-02

## 2017-07-02 RX ORDER — POTASSIUM CHLORIDE 14.9 MG/ML
10 INJECTION INTRAVENOUS
Status: DISCONTINUED | OUTPATIENT
Start: 2017-07-02 | End: 2017-07-02

## 2017-07-02 RX ORDER — SODIUM CHLORIDE 0.9 % (FLUSH) 0.9 %
5-10 SYRINGE (ML) INJECTION EVERY 8 HOURS
Status: DISCONTINUED | OUTPATIENT
Start: 2017-07-02 | End: 2017-07-03 | Stop reason: SDUPTHER

## 2017-07-02 RX ORDER — ROCURONIUM BROMIDE 10 MG/ML
100 INJECTION, SOLUTION INTRAVENOUS
Status: COMPLETED | OUTPATIENT
Start: 2017-07-02 | End: 2017-07-02

## 2017-07-02 RX ORDER — GUAIFENESIN 100 MG/5ML
81 LIQUID (ML) ORAL DAILY
COMMUNITY
End: 2017-07-19

## 2017-07-02 RX ORDER — SODIUM CHLORIDE 900 MG/100ML
INJECTION INTRAVENOUS
Status: DISPENSED
Start: 2017-07-02 | End: 2017-07-02

## 2017-07-02 RX ORDER — ACETAMINOPHEN 325 MG/1
650 TABLET ORAL
Status: DISCONTINUED | OUTPATIENT
Start: 2017-07-02 | End: 2017-07-08 | Stop reason: HOSPADM

## 2017-07-02 RX ORDER — SODIUM CHLORIDE 0.9 % (FLUSH) 0.9 %
5-10 SYRINGE (ML) INJECTION AS NEEDED
Status: DISCONTINUED | OUTPATIENT
Start: 2017-07-02 | End: 2017-07-08 | Stop reason: HOSPADM

## 2017-07-02 RX ORDER — FUROSEMIDE 10 MG/ML
40 INJECTION INTRAMUSCULAR; INTRAVENOUS ONCE
Status: COMPLETED | OUTPATIENT
Start: 2017-07-02 | End: 2017-07-02

## 2017-07-02 RX ORDER — HYDRALAZINE HYDROCHLORIDE 20 MG/ML
20 INJECTION INTRAMUSCULAR; INTRAVENOUS
Status: DISCONTINUED | OUTPATIENT
Start: 2017-07-02 | End: 2017-07-08 | Stop reason: HOSPADM

## 2017-07-02 RX ORDER — POTASSIUM CHLORIDE 7.45 MG/ML
10 INJECTION INTRAVENOUS
Status: COMPLETED | OUTPATIENT
Start: 2017-07-02 | End: 2017-07-03

## 2017-07-02 RX ORDER — MUPIROCIN 20 MG/G
OINTMENT TOPICAL 2 TIMES DAILY
Status: DISPENSED | OUTPATIENT
Start: 2017-07-02 | End: 2017-07-07

## 2017-07-02 RX ORDER — ETOMIDATE 2 MG/ML
20 INJECTION INTRAVENOUS
Status: COMPLETED | OUTPATIENT
Start: 2017-07-02 | End: 2017-07-02

## 2017-07-02 RX ADMIN — Medication: at 17:00

## 2017-07-02 RX ADMIN — POTASSIUM CHLORIDE 10 MEQ: 10 INJECTION, SOLUTION INTRAVENOUS at 14:49

## 2017-07-02 RX ADMIN — POTASSIUM CHLORIDE 10 MEQ: 10 INJECTION, SOLUTION INTRAVENOUS at 23:50

## 2017-07-02 RX ADMIN — POTASSIUM CHLORIDE 10 MEQ: 10 INJECTION, SOLUTION INTRAVENOUS at 21:44

## 2017-07-02 RX ADMIN — Medication: at 13:00

## 2017-07-02 RX ADMIN — PROPOFOL 50 MCG/KG/MIN: 10 INJECTION, EMULSION INTRAVENOUS at 18:32

## 2017-07-02 RX ADMIN — CHLORHEXIDINE GLUCONATE 15 ML: 1.2 RINSE ORAL at 09:10

## 2017-07-02 RX ADMIN — Medication 10 ML: at 14:50

## 2017-07-02 RX ADMIN — POTASSIUM CHLORIDE 10 MEQ: 10 INJECTION, SOLUTION INTRAVENOUS at 12:40

## 2017-07-02 RX ADMIN — POTASSIUM CHLORIDE 10 MEQ: 10 INJECTION, SOLUTION INTRAVENOUS at 18:24

## 2017-07-02 RX ADMIN — POTASSIUM CHLORIDE 10 MEQ: 10 INJECTION, SOLUTION INTRAVENOUS at 20:42

## 2017-07-02 RX ADMIN — PROPOFOL 50 MCG/KG/MIN: 10 INJECTION, EMULSION INTRAVENOUS at 14:48

## 2017-07-02 RX ADMIN — POTASSIUM CHLORIDE 10 MEQ: 10 INJECTION, SOLUTION INTRAVENOUS at 10:36

## 2017-07-02 RX ADMIN — PROPOFOL 50 MCG/KG/MIN: 10 INJECTION, EMULSION INTRAVENOUS at 09:30

## 2017-07-02 RX ADMIN — MUPIROCIN: 20 OINTMENT TOPICAL at 19:02

## 2017-07-02 RX ADMIN — ROCURONIUM BROMIDE 100 MG: 10 INJECTION INTRAVENOUS at 00:31

## 2017-07-02 RX ADMIN — FUROSEMIDE 40 MG: 10 INJECTION, SOLUTION INTRAMUSCULAR; INTRAVENOUS at 20:38

## 2017-07-02 RX ADMIN — POTASSIUM CHLORIDE 10 MEQ: 10 INJECTION, SOLUTION INTRAVENOUS at 18:26

## 2017-07-02 RX ADMIN — PROPOFOL 20 MCG/KG/MIN: 10 INJECTION, EMULSION INTRAVENOUS at 01:33

## 2017-07-02 RX ADMIN — PROPOFOL 50 MCG/KG/MIN: 10 INJECTION, EMULSION INTRAVENOUS at 11:47

## 2017-07-02 RX ADMIN — ETOMIDATE 20 MG: 2 INJECTION, SOLUTION INTRAVENOUS at 00:31

## 2017-07-02 RX ADMIN — POTASSIUM CHLORIDE 10 MEQ: 10 INJECTION, SOLUTION INTRAVENOUS at 14:48

## 2017-07-02 RX ADMIN — CHLORHEXIDINE GLUCONATE 15 ML: 1.2 RINSE ORAL at 19:02

## 2017-07-02 RX ADMIN — POTASSIUM CHLORIDE 10 MEQ: 10 INJECTION, SOLUTION INTRAVENOUS at 01:04

## 2017-07-02 RX ADMIN — POTASSIUM CHLORIDE 10 MEQ: 10 INJECTION, SOLUTION INTRAVENOUS at 22:52

## 2017-07-02 RX ADMIN — POTASSIUM CHLORIDE 10 MEQ: 10 INJECTION, SOLUTION INTRAVENOUS at 12:41

## 2017-07-02 RX ADMIN — Medication 10 ML: at 20:38

## 2017-07-02 RX ADMIN — MUPIROCIN: 20 OINTMENT TOPICAL at 09:10

## 2017-07-02 RX ADMIN — POTASSIUM CHLORIDE 10 MEQ: 10 INJECTION, SOLUTION INTRAVENOUS at 10:37

## 2017-07-02 RX ADMIN — PROPOFOL 10 MCG/KG/MIN: 10 INJECTION, EMULSION INTRAVENOUS at 00:44

## 2017-07-02 RX ADMIN — PIPERACILLIN SODIUM,TAZOBACTAM SODIUM 3.38 G: 3; .375 INJECTION, POWDER, FOR SOLUTION INTRAVENOUS at 02:02

## 2017-07-02 RX ADMIN — Medication: at 09:00

## 2017-07-02 RX ADMIN — PROPOFOL 50 MCG/KG/MIN: 10 INJECTION, EMULSION INTRAVENOUS at 22:17

## 2017-07-02 RX ADMIN — PIPERACILLIN SODIUM,TAZOBACTAM SODIUM 3.38 G: 3; .375 INJECTION, POWDER, FOR SOLUTION INTRAVENOUS at 09:11

## 2017-07-02 RX ADMIN — PIPERACILLIN SODIUM,TAZOBACTAM SODIUM 3.38 G: 3; .375 INJECTION, POWDER, FOR SOLUTION INTRAVENOUS at 18:22

## 2017-07-02 RX ADMIN — PROPOFOL 30 MCG/KG/MIN: 10 INJECTION, EMULSION INTRAVENOUS at 06:54

## 2017-07-02 NOTE — ED NOTES
Pt returns from CT at this time, patient very confused, color appears purple, Dr. Ly Re at bedside. Set up for RSI at this time.

## 2017-07-02 NOTE — CONSULTS
Surgery Consult    Subjective:      Hever Beard is a 78 y.o. male admitted to medical ICU overnight with sigmoid volvulus, s/p decompressive colonoscopy with Dr. Ladarius Hidalgo. Pt is retired for the past 9 months and his wife states he has become more sedentary and doesn't get out of the house. He presented to the ED 2 days ago with constipation and had a CT showing severe colonic distention and fecal stasis. He was treated with morphine, potassium and sent home with mag citrate. He developed significant progression of his pain, with associated writhing and developed some mental status changes and he was brought back to the ED last night. The patient had a mild lactic acidosis and was obtunded and required intubation. CT showed a sigmoid volvulus and Dr. Kim Courser was consulted and performed decompressive colonoscopy. Pt remains in ICU on vent this morning, but his abdomen is softer and appears non-tender. Labs are improved with resolution of the lactic acidosis. Past Medical History:   Diagnosis Date    Chronic kidney disease     Pacemaker 2013     Past Surgical History:   Procedure Laterality Date    HX ORTHOPAEDIC      hip replacement, knee, back      History reviewed. No pertinent family history.   Social History     Social History    Marital status:      Spouse name: N/A    Number of children: N/A    Years of education: N/A     Social History Main Topics    Smoking status: Never Smoker    Smokeless tobacco: Never Used    Alcohol use Yes      Comment: occationally    Drug use: No    Sexual activity: Not Asked     Other Topics Concern    None     Social History Narrative      Current Facility-Administered Medications   Medication Dose Route Frequency Provider Last Rate Last Dose    propofol (DIPRIVAN) infusion  5-50 mcg/kg/min IntraVENous TITRATE Dotty Mandril, DO 26.8 mL/hr at 07/02/17 1147 50 mcg/kg/min at 07/02/17 1147    chlorhexidine (PERIDEX) 0.12 % mouthwash 15 mL  15 mL Oral BID Alivia Jamar, DO   15 mL at 17 0910    mupirocin (BACTROBAN) 2 % ointment   Both Nostrils BID Alivia Jamar, DO        piperacillin-tazobactam (ZOSYN) 3.375 g in 0.9% sodium chloride (MBP/ADV) 100 mL  3.375 g IntraVENous Q8H Alivia Jamar, DO 25 mL/hr at 17 0911 3.375 g at 17 0911    sodium chloride (NS) flush 5-10 mL  5-10 mL IntraVENous Q8H Jose Frausto MD        sodium chloride (NS) flush 5-10 mL  5-10 mL IntraVENous PRN Jose Frausto MD        acetaminophen (TYLENOL) tablet 650 mg  650 mg Oral Q4H PRN Jose Frausto MD        ondansetron [facility]) injection 4 mg  4 mg IntraVENous Q4H PRN Jose Frausto MD        hydrALAZINE (APRESOLINE) 20 mg/mL injection 20 mg  20 mg IntraVENous Q6H PRN Jose Frausto MD        zinc oxide-cod liver oil (DESITIN) 40 % paste   Topical PRN David Kan MD        potassium chloride 10 mEq in 100 ml IVPB  10 mEq IntraVENous Q1H Rosalind GUY  mL/hr at 17 1241 10 mEq at 17 1241        Allergies   Allergen Reactions    Augmentin [Amoxicillin-Pot Clavulanate] Rash    Ciprofloxacin Rash       Review of Systems:  Review of systems not obtained due to patient factors. Objective:      Patient Vitals for the past 8 hrs:   BP Temp Pulse Resp SpO2   17 1300 94/49 - 83 16 97 %   17 1200 111/57 98.8 °F (37.1 °C) 79 16 98 %   17 1144 - - 78 17 97 %   17 1100 112/56 - 75 17 100 %   17 1000 101/53 - 79 16 96 %   17 0900 132/65 - 84 18 98 %   17 0800 125/56 98.4 °F (36.9 °C) 81 15 100 %   17 0729 - - 79 14 100 %   07/02/17 0700 123/60 - 76 15 100 %   17 0630 153/69 - 91 22 100 %       Temp (24hrs), Av.5 °F (36.9 °C), Min:97.4 °F (36.3 °C), Max:99.1 °F (37.3 °C)      Physical Exam:  GENERAL: no distress, appears stated age, LUNG: clear to auscultation bilaterally, HEART: regular rate and rhythm, S1, S2 normal, no murmur, click, rub or gallop, ABDOMEN: soft, non-tender, distended.  Bowel sounds hypoactive. No masses,  no organomegaly, EXTREMITIES:  extremities normal, atraumatic, no cyanosis or edema    Assessment:     Hospital Problems  Never Reviewed          Codes Class Noted POA    Respiratory failure ([facility] [address] ICD-10-CM: J96.90  ICD-9-CM: 518.81  7/2/2017 Unknown              Plan:     Pt with sigmoid volvulus precipitated by mag citrate administration in the setting of already distended colon with fecal stasis. Presently doing better. Agree that neostigmine may be of benefit in this setting to prevent recurrence. No indication for sigmoid resection at this time. We will follow along with GI and IM.     Signed By: Shanna Luz MD, [facility]    July 2, 2017

## 2017-07-02 NOTE — PROGRESS NOTES
TRANSFER - IN REPORT:    Verbal report received from Keron(name) on Lauramaritza Becerril  being received from ED 21(unit) for ordered procedure      Report consisted of patients Situation, Background, Assessment and   Recommendations(SBAR). Information from the following report(s) SBAR and Recent Results was reviewed with the receiving nurse. Opportunity for questions and clarification was provided. Assessment completed upon patients arrival to unit and care assumed.

## 2017-07-02 NOTE — ED PROVIDER NOTES
HPI Comments: Devon Rivera is a 78 y.o. male with hx of CKD and pacemaker who presents ambulatory to the ED c/o constant abdominal pain and distention x 2 days. They also note constipation x weeks noting only liquid stool. Family also notes fatigue, confusion, and loss of appetite. They state pt has been thrashing in bed all day and unable to sleep. Family report pt has been talking less than baseline and unable to communicate. They state pt was unable to dress self and ambulate and required assistance with both today. Pt is normally alert and oriented, ambulatory, and can take care of his own ADLs. Per family, pt has not eaten x 2 days. Family state pt was evaluated in ED yesterday for abdominal pain and distension. Labs were within normal limits and UA was negative for infection. The CT abdomen indicated \"severe colon distention with fecal stasis. \" Pt was administered morphine and KCL. He was prescribed norco and magnesium citrate. Family states he took the magnesium citrate and attempted an enema, but has not had a bowel movement yet. Family states patient has not had a good bowel movement in \"weeks\", and that he has been seen by GI for watery stools several months ago. Pt denies sore throat, cough, CP, SOB, fever, chills, nausea and vomiting. Social hx: - Tobacco use, - EtOH use, - Illicit drug use    PCP: Star Mansfield MD    There are no other complaints, changes or physical findings at this time. The history is provided by the patient and a relative. No  was used. Past Medical History:   Diagnosis Date    Chronic kidney disease     Pacemaker 2013       Past Surgical History:   Procedure Laterality Date    HX ORTHOPAEDIC      hip replacement, knee, back         History reviewed. No pertinent family history.     Social History     Social History    Marital status:      Spouse name: N/A    Number of children: N/A    Years of education: N/A     Occupational History  Not on file. Social History Main Topics    Smoking status: Never Smoker    Smokeless tobacco: Never Used    Alcohol use Yes      Comment: occationally    Drug use: No    Sexual activity: Not on file     Other Topics Concern    Not on file     Social History Narrative         ALLERGIES: Augmentin [amoxicillin-pot clavulanate] and Ciprofloxacin    Review of Systems   Constitutional: Positive for appetite change and fatigue. Negative for chills and fever. HENT: Negative for congestion and rhinorrhea. Eyes: Negative for visual disturbance. Respiratory: Negative for cough, shortness of breath and wheezing. Cardiovascular: Negative for chest pain and palpitations. Gastrointestinal: Positive for abdominal distention, abdominal pain and constipation. Negative for nausea and vomiting. Endocrine: Negative. Genitourinary: Negative for difficulty urinating and dysuria. Musculoskeletal: Negative. Skin: Negative for rash. Neurological: Negative for dizziness, weakness and light-headedness. Positive for less responsive. Psychiatric/Behavioral: Positive for confusion. Negative for suicidal ideas. Vitals:    07/02/17 0332 07/02/17 0334 07/02/17 0400 07/02/17 0430   BP: (!) 169/95  109/59 101/58   Pulse: 100 (!) 103 82 76   Resp: 12 14 14 14   Temp:       SpO2: 100% 100% 98% 98%   Weight:       Height:                Physical Exam   Constitutional: He is oriented to person, place, and time. He appears well-developed and well-nourished. Appears uncomfortable. HENT:   Head: Normocephalic and atraumatic. Dry mucus membranes. Eyes: Conjunctivae and EOM are normal.   Neck: Neck supple. No JVD present. No tracheal deviation present. Cardiovascular: Regular rhythm and intact distal pulses. Tachycardia present. Exam reveals no gallop and no friction rub. No murmur heard. Pulmonary/Chest: Effort normal and breath sounds normal. No stridor. Tachypnea noted.  No respiratory distress. He has no wheezes. Abdominal: Soft. He exhibits distension. He exhibits no mass. There is no tenderness. There is no guarding. No active bowel sounds. Musculoskeletal: Normal range of motion. He exhibits no edema or tenderness. No deformity. No peripheral edema. Neurological: He is alert and oriented to person, place, and time. He has normal strength. No focal deficits. Skin: Skin is warm, dry and intact. No rash noted. There is pallor. Nursing note and vitals reviewed. MDM  Number of Diagnoses or Management Options  Acute respiratory failure with hypoxia ([address]): Hypokalemia:   Volvulus [facility]):   Diagnosis management comments: Pt is tachycardic and tachypneic with a distended abdomen. Pt appears in distress. CT scan yesterday showed severe colonic distention with fecal stasis. DDx includes intestinal perforation, worsening obstruction, ischemic bowel, sepsis, metabolic abnormality, nolvia. Will check labs, lactic acid, acute series to eval for perforation. Still start fluids, likely will need surgery consult. Admit. Amount and/or Complexity of Data Reviewed  Clinical lab tests: ordered and reviewed  Tests in the radiology section of CPT®: ordered and reviewed  Tests in the medicine section of CPT®: ordered and reviewed  Obtain history from someone other than the patient: yes (family)  Review and summarize past medical records: yes  Discuss the patient with other providers: yes (GI; Hospitalist; General Surgery  )  Independent visualization of images, tracings, or specimens: yes    Patient Progress  Patient progress: stable    ED Course       Procedures   EKG interpretation: (Preliminary) 21:52  Rhythm: sinus tachycardia; and regular . Rate (approx.): 117 bpm; Axis: normal; ME interval: normal; QRS interval: prolonged; ST/T wave: non-specific changes; LBBB; no significant change from yesterday    12:30  Acutely decompensated.  Upon returning from CT, pt was altered and no longer responsive. Pt with agonal respirations, pulse ox is 54% on RA. Procedure Note - Orotracheal Intubation:   12:30  Performed by: Kenn Cueva DO   Indication for procedure: impending respiratory failure and impending airway compromise  RSI performed. The patient was sedated with 20 mg of etomidate and 100 mg of rocuronium orotracheally intubated with a 7.5 cuffed Citizen of [place] endotracheal tube using a glidescope with direct visualization. Tube was advanced to 23 cm at the teeth. ETT location confirmed by bilateral, symmetric breath sounds, good end-tidal CO2 detector color change , no breath sounds over stomach and chest x-ray visualization. Number of attempts: 1  Complications: none  RSI was used. The procedure took 1-15 minutes, and pt tolerated well. Written by Kian Whittington, JIM Scribe, as dictated by Kenn Cueva DO.        CONSULT NOTE:   12:53 AM  Kenn Cueva DO spoke with Dr. Simran Villanueva,  Specialty: hospitalist  Discussed pt's hx, disposition, and available diagnostic and imaging results. Reviewed care plans. Consultant agrees with plans as outlined. Hospitalist will admit pt. Written by Kian Whittington ED Scribe, as dictated by Kenn Cueva DO.      CONSULT NOTE:   1:00 AM  Kenn Cueva DO spoke with Dr. Staci Mandel,  Specialty: GI  Discussed pt's hx, disposition, and available diagnostic and imaging results. Reviewed care plans. Consultant agrees with plans as outlined. Written by JIM Valenciaibe, as dictated by Kenn Cueva DO.    CONSULT NOTE:   1:13 AM  Kenn Cueva DO spoke with Dr. Trevor Vora  Specialty: General Surgery  Discussed pt's hx, disposition, and available diagnostic and imaging results. Reviewed care plans. Consultant agrees with plans as outlined. Dr. Trevor Vora states pt needs emergent colonoscopy decompression of volvulus. If there is intestinal perforation, will take to OR.   Written by Kian Whittington, JIM Scribe, as dictated by Kenn Cueva DO.    1:35 AM  Spoke with Dr. Latrell Lemus, who is calling in endoscopy team.     1:40 AM  Spoke with pt's family and updated them on lab and imaging results and plan of care. They consent to the procedure. CRITICAL CARE NOTE :  IMPENDING DETERIORATION -Airway, Respiratory, Cardiovascular, CNS and Metabolic  ASSOCIATED RISK FACTORS - Hypotension, Shock, Hypoxia, Dysrhythmia, Metabolic changes, Dehydration, Vascular Compromise and CNS Decompensation  MANAGEMENT- Bedside Assessment and Supervision of Care  INTERPRETATION -  Xrays, CT Scan, Blood Gases, ECG and Blood Pressure  INTERVENTIONS - hemodynamic mngmt, vent mngmt, gastric tube, vascular control and Metobolic interventions  CASE REVIEW - Hospitalist, Medical Sub-Specialist, Nursing and Family  TREATMENT RESPONSE -Improved  PERFORMED BY - Self    NOTES   :  I have spent 120 minutes of critical care time involved in lab review, consultations with specialist, family decision- making, bedside attention and documentation. During this entire length of time I was immediately available to the patient .     Deretha Fails DO    LABORATORY TESTS:  Recent Results (from the past 12 hour(s))   EKG, 12 LEAD, INITIAL    Collection Time: 07/01/17  9:51 PM   Result Value Ref Range    Ventricular Rate 117 BPM    Atrial Rate 117 BPM    P-R Interval 136 ms    QRS Duration 150 ms    Q-T Interval 408 ms    QTC Calculation (Bezet) 569 ms    Calculated P Axis 10 degrees    Calculated R Axis -3 degrees    Calculated T Axis 118 degrees    Diagnosis       Sinus tachycardia with premature atrial complexes with aberrant conduction  Left bundle branch block  When compared with ECG of 30-JUN-2017 22:34,  aberrant conduction is now present  WA interval has decreased  T wave inversion less evident in Lateral leads     CBC WITH AUTOMATED DIFF    Collection Time: 07/01/17 10:02 PM   Result Value Ref Range    WBC 16.0 (H) 4.1 - 11.1 K/uL    RBC 4.28 4.10 - 5.70 M/uL    HGB 14.4 12.1 - 17.0 g/dL    HCT 40.8 36.6 - 50.3 %    MCV 95.3 80.0 - 99.0 FL    MCH 33.6 26.0 - 34.0 PG    MCHC 35.3 30.0 - 36.5 g/dL    RDW 13.1 11.5 - 14.5 %    PLATELET 349 106 - 785 K/uL    NEUTROPHILS 88 (H) 32 - 75 %    LYMPHOCYTES 6 (L) 12 - 49 %    MONOCYTES 6 5 - 13 %    EOSINOPHILS 0 0 - 7 %    BASOPHILS 0 0 - 1 %    ABS. NEUTROPHILS 14.1 (H) 1.8 - 8.0 K/UL    ABS. LYMPHOCYTES 1.0 0.8 - 3.5 K/UL    ABS. MONOCYTES 0.9 0.0 - 1.0 K/UL    ABS. EOSINOPHILS 0.0 0.0 - 0.4 K/UL    ABS. BASOPHILS 0.0 0.0 - 0.1 K/UL   METABOLIC PANEL, COMPREHENSIVE    Collection Time: 07/01/17 10:02 PM   Result Value Ref Range    Sodium 135 (L) 136 - 145 mmol/L    Potassium 2.8 (L) 3.5 - 5.1 mmol/L    Chloride 101 97 - 108 mmol/L    CO2 23 21 - 32 mmol/L    Anion gap 11 5 - 15 mmol/L    Glucose 180 (H) 65 - 100 mg/dL    BUN 18 6 - 20 MG/DL    Creatinine 1.11 0.70 - 1.30 MG/DL    BUN/Creatinine ratio 16 12 - 20      GFR est AA >60 >60 ml/min/1.73m2    GFR est non-AA >60 >60 ml/min/1.73m2    Calcium 8.3 (L) 8.5 - 10.1 MG/DL    Bilirubin, total 0.7 0.2 - 1.0 MG/DL    ALT (SGPT) 21 12 - 78 U/L    AST (SGOT) 35 15 - 37 U/L    Alk.  phosphatase 88 45 - 117 U/L    Protein, total 7.3 6.4 - 8.2 g/dL    Albumin 3.7 3.5 - 5.0 g/dL    Globulin 3.6 2.0 - 4.0 g/dL    A-G Ratio 1.0 (L) 1.1 - 2.2     LACTIC ACID    Collection Time: 07/01/17 10:02 PM   Result Value Ref Range    Lactic acid 2.4 (HH) 0.4 - 2.0 MMOL/L   LIPASE    Collection Time: 07/01/17 10:02 PM   Result Value Ref Range    Lipase 81 73 - 393 U/L   MAGNESIUM    Collection Time: 07/01/17 10:02 PM   Result Value Ref Range    Magnesium 3.2 (H) 1.6 - 2.4 mg/dL   PROTHROMBIN TIME + INR    Collection Time: 07/02/17 12:42 AM   Result Value Ref Range    INR 1.3 (H) 0.9 - 1.1      Prothrombin time 12.7 (H) 9.0 - 11.1 sec   PTT    Collection Time: 07/02/17 12:42 AM   Result Value Ref Range    aPTT 30.1 22.1 - 32.5 sec    aPTT, therapeutic range     58.0 - 77.0 SECS   BLOOD GAS, ARTERIAL    Collection Time: 07/02/17 12:56 AM Result Value Ref Range    pH 7.27 (L) 7.35 - 7.45      PCO2 52 (H) 35.0 - 45.0 mmHg    PO2 123 (H) 80 - 100 mmHg    O2 SAT 98 (H) 92 - 97 %    BICARBONATE 23 22 - 26 mmol/L    BASE DEFICIT 4.5 mmol/L    O2 METHOD VENTILATOR      FIO2 70 %    MODE A/C      Tidal volume 550      SET RATE 12      SPONTANEOUS RATE 12.0      EPAP/CPAP/PEEP 5.0      Sample source ARTERIAL      SITE LEFT RADIAL      GLEN'S TEST YES     URINALYSIS W/ REFLEX CULTURE    Collection Time: 07/02/17  1:02 AM   Result Value Ref Range    Color DARK YELLOW      Appearance CLEAR CLEAR      Specific gravity 1.025 1.003 - 1.030      pH (UA) 6.0 5.0 - 8.0      Protein 100 (A) NEG mg/dL    Glucose NEGATIVE  NEG mg/dL    Ketone 15 (A) NEG mg/dL    Blood MODERATE (A) NEG      Urobilinogen 1.0 0.2 - 1.0 EU/dL    Nitrites NEGATIVE  NEG      Leukocyte Esterase NEGATIVE  NEG      WBC 0-4 0 - 4 /hpf    RBC 0-5 0 - 5 /hpf    Epithelial cells FEW FEW /lpf    Bacteria NEGATIVE  NEG /hpf    UA:UC IF INDICATED CULTURE NOT INDICATED BY UA RESULT CNI      Hyaline cast 2-5 0 - 5 /lpf    Other: Renal Epithelial cells Present     BILIRUBIN, CONFIRM    Collection Time: 07/02/17  1:02 AM   Result Value Ref Range    Bilirubin UA, confirm NEGATIVE  NEG     LACTIC ACID    Collection Time: 07/02/17  1:51 AM   Result Value Ref Range    Lactic acid  0.4 - 2.0 MMOL/L     UNABLE TO OBTAIN ACCURATE RESULTS DUE TO GROSS LIPEMIA   BLOOD GAS, ARTERIAL    Collection Time: 07/02/17  4:30 AM   Result Value Ref Range    pH 7.36 7.35 - 7.45      PCO2 44 35.0 - 45.0 mmHg    PO2 88 80 - 100 mmHg    O2 SAT 96 92 - 97 %    BICARBONATE 24 22 - 26 mmol/L    BASE DEFICIT 1.3 mmol/L    O2 METHOD VENTILATOR      FIO2 50 %    MODE A/C      Tidal volume 500      SET RATE 14      EPAP/CPAP/PEEP 6.0      Sample source ARTERIAL      SITE RIGHT RADIAL      GLEN'S TEST YES     CBC W/O DIFF    Collection Time: 07/02/17  4:38 AM   Result Value Ref Range    WBC 13.2 (H) 4.1 - 11.1 K/uL    RBC 3.69 (L) 4.10 - 5.70 M/uL    HGB 12.0 (L) 12.1 - 17.0 g/dL    HCT 35.4 (L) 36.6 - 50.3 %    MCV 95.9 80.0 - 99.0 FL    MCH 32.5 26.0 - 34.0 PG    MCHC 33.9 30.0 - 36.5 g/dL    RDW 13.3 11.5 - 14.5 %    PLATELET 276 925 - 237 K/uL   URINALYSIS W/ REFLEX CULTURE    Collection Time: 07/02/17  4:38 AM   Result Value Ref Range    Color YELLOW/STRAW      Appearance CLEAR CLEAR      Specific gravity PENDING     pH (UA) 6.0 5.0 - 8.0      Protein 100 (A) NEG mg/dL    Glucose NEGATIVE  NEG mg/dL    Ketone TRACE (A) NEG mg/dL    Blood MODERATE (A) NEG      Urobilinogen 1.0 0.2 - 1.0 EU/dL    Nitrites NEGATIVE  NEG      Leukocyte Esterase NEGATIVE  NEG      WBC PENDING /hpf    RBC PENDING /hpf    Epithelial cells PENDING /lpf    Bacteria PENDING /hpf    UA:UC IF INDICATED PENDING        IMAGING RESULTS:  CT Results  (Last 48 hours)               07/02/17 0010  CT ABD PELV W CONT Final result    Impression:  IMPRESSION:       1. No bowel perforation or free air demonstrated in the abdomen. 2. Volvulus involving the sigmoid and left colon as described above, with   associated proximal colonic obstruction and fecal retention. 3. Other incidental  and nonacute findings as described above. Narrative:  EXAM:  CT ABD PELV W CONT       INDICATION: Continued Abdominal pain after diagnosis of constipation yesterday;   Possible Bowl perf       COMPARISON: Same CT abdomen and pelvis 7/1/2017, 6/6/2010. CONTRAST: 100 cc Isovue-370. TECHNIQUE:    Following the uneventful intravenous administration of 100 cc Isovue-370, thin   axial images were obtained through the abdomen and pelvis. Coronal and sagittal   reconstructions were generated. Oral contrast was not administered. CT dose   reduction was achieved through use of a standardized protocol tailored for this   examination and automatic exposure control for dose modulation.        FINDINGS:    LUNG BASES: Bibasilar atelectasis with possible minimal right lower lobe infiltrate. Right hemidiaphragm elevation. INCIDENTALLY IMAGED HEART AND MEDIASTINUM: Unremarkable. LIVER: No mass or biliary dilatation. GALLBLADDER: Unremarkable. SPLEEN: No mass. PANCREAS: No mass or ductal dilatation. ADRENALS: Unremarkable. KIDNEYS: No mass, calculus, or hydronephrosis. STOMACH: Unremarkable. SMALL BOWEL: No dilatation or wall thickening. COLON: There is volvulus with mesenteric twisting in the right lower abdomen,   with the sigmoid and probably a large portion of the left colon included in the   volvulus. The remainder of the colon is extremely distended with a large amount   of retained fecal material due to associated obstruction. There is no   perforation or rupture. APPENDIX: Not well seen but no acute appendiceal abnormality is suspected. PERITONEUM: No ascites or pneumoperitoneum. RETROPERITONEUM: No lymphadenopathy or aortic aneurysm. REPRODUCTIVE ORGANS: Unremarkable for age. URINARY BLADDER: No mass or calculus. BONES: No destructive bone lesion. Right hip replacement. ADDITIONAL COMMENTS: Masslike enlargement of the right iliac as muscle measures   4.9 x 3.3 cm, suggesting old hemorrhage or other masslike density, relatively   stable since older studies dating back to 2010.           06/30/17 2310  CT ABD PELV W CONT Final result    Impression:   impression: Severe colon distention with fecal stasis up. Narrative:  EXAM:  CT ABD PELV W CONT       INDICATION: abd distension, decreased BM and low abd pain       COMPARISON: 2010       CONTRAST:  100 mL of Isovue-370. TECHNIQUE:    Following the uneventful intravenous administration of 100 cc Isovue-370, thin   axial images were obtained through the abdomen and pelvis. Coronal and sagittal   reconstructions were generated. Oral contrast water was administered.  CT dose   reduction was achieved through use of a standardized protocol tailored for this   examination and automatic exposure control for dose modulation. FINDINGS:    Liver and spleen are normal in size. Pancreas and gallbladder appear   unremarkable. Kidneys are unobstructed. There is severe colon distention with   fecal stasis. There is no significant small bowel distention. There is no   evidence to suggest obstruction. There is no free air or free fluid. Major   vessels appear patent. No wall thickening. CXR Results  (Last 48 hours)               07/02/17 0036  XR CHEST PORT Final result    Impression:  IMPRESSION:       1. ET tube placement as described. .  .   2. Increased interstitial markings throughout both lungs, right greater than   left. Correlate for developing interstitial edema. Narrative:  INDICATION:  intubation        EXAM: Chest single view. COMPARISON: 7/1/2017At 2244 hours. FINDINGS: A single frontal view of the chest at 0027 hours shows an endotracheal   tube has been introduced, with its tip 4 cm above the kathi, just below the   thoracic inlet. Interstitial markings have increased slightly throughout both   lungs, right greater than left. .  The heart, mediastinum and pulmonary   vasculature are stable . Transvenous pacemaker from the right is stable. .  The   bony thorax is unremarkable for age. Gae Notch 07/01/17 2259  XR ABD ACUTE W 1 V CHEST Final result    Impression:  IMPRESSION:    Multiple dilated bowel loops, thought to predominantly represent colon. . Free   air is not suspected but cannot be excluded. Narrative:  INDICATION: Abdominal pain, distention. EXAM: ABDOMEN 3 VIEW RADIOGRAPH. COMPARISON:  None. FINDINGS: Three-view examination of the abdomen, including a frontal view of the   chest, and one dependent view,  reveals distention of multiple loops of bowel,   thought to largely represent colon. There may be dilated small bowel loops, but   this cannot be established with certainty. The pelvis is not included.  The right hemidiaphragm is elevated, and views performed for free intraperitoneal air are   technically inadequate for this determination. There is no mechanical   obstruction, or soft tissue mass. Visualized lung fields are clear, with   elevation of the right hemidiaphragm, exaggerated by gaseous filling of the   colon. A transvenous pacemaker has been introduced from the left since the prior   study, with no pneumothorax. Leads project over the right atrium and right   ventricle. Mary Fallon XR ABD PORT  1 V   Final Result   INDICATION: OG tube placement.     EXAM: SINGLE VIEW ABDOMEN RADIOGRAPH.     COMPARISON: 7/1/2017.     FINDINGS: A supine radiograph of the abdomen shows persistent colonic  distention. A gastric tube has been introduced with its sidehole and tip  projecting over the left upper quadrant in the expected location of the gastric  fundus. . No soft tissue masses or pathologic calcifications are identified. The  bones and soft tissues are within normal limits. Contrast excretion in the left  kidney is incidentally noted. .     IMPRESSION  IMPRESSION:   1. Gastric tube position as described. .  2.  Persistent colonic distention.          MEDICATIONS GIVEN:  Medications   potassium chloride 10 mEq in 100 ml IVPB (0 mEq IntraVENous IV Completed 7/2/17 0204)   propofol (DIPRIVAN) infusion (30 mcg/kg/min × 89.4 kg IntraVENous Rate Change 7/2/17 0344)   chlorhexidine (PERIDEX) 0.12 % mouthwash 15 mL (not administered)   mupirocin (BACTROBAN) 2 % ointment (not administered)   piperacillin-tazobactam (ZOSYN) 3.375 g in 0.9% sodium chloride (MBP/ADV) 100 mL (3.375 g IntraVENous New Bag 7/2/17 0202)   ADDaptor (  Canceled Entry 7/2/17 0123)   0.9% sodium chloride (MBP/ADV) 0.9 % infusion (  Canceled Entry 7/2/17 0123)   piperacillin-tazobactam (ZOSYN) 3.375 gram injection (  Canceled Entry 7/2/17 0123)   sodium chloride (NS) flush 5-10 mL (not administered)   sodium chloride (NS) flush 5-10 mL (not administered) acetaminophen (TYLENOL) tablet 650 mg (not administered)   ondansetron (ZOFRAN) injection 4 mg (not administered)   hydrALAZINE (APRESOLINE) 20 mg/mL injection 20 mg (not administered)   sodium chloride 0.9 % bolus infusion 1,000 mL (0 mL IntraVENous IV Completed 7/1/17 2331)   0.9% sodium chloride infusion (0 mL/hr IntraVENous IV Completed 7/2/17 0032)   iopamidol (ISOVUE-370) 76 % injection 100 mL (100 mL IntraVENous Given 7/1/17 2321)   sodium chloride (NS) flush 10 mL (10 mL IntraVENous Given 7/1/17 2321)   sodium chloride 0.9 % bolus infusion 1,682 mL (0 mL IntraVENous IV Completed 7/2/17 0124)   etomidate (AMIDATE) 2 mg/mL injection 20 mg (20 mg IntraVENous Given 7/2/17 0031)   rocuronium (ZEMURON) injection 100 mg (100 mg IntraVENous Given 7/2/17 0031)       IMPRESSION:  1. Volvulus ([address]    2. Acute respiratory failure with hypoxia (HCC)    3. Hypokalemia        PLAN:  1. Admit to hospital.    12:53 AM  Patient is being admitted to the hospital by Dr. Neha Landeros. The results of their tests and reasons for their admission have been discussed with them and/or available family. They convey agreement and understanding for the need to be admitted and for their admission diagnosis. Consultation has been made with the inpatient physician specialist for hospitalization. This note is prepared by Vijay Jovel, acting as Scribe for [employer]a [employer] DO. Christiana [employer] DO: The scribe's documentation has been prepared under my direction and personally reviewed by me in its entirety. I confirm that the note above accurately reflects all work, treatment, procedures, and medical decision making performed by me.

## 2017-07-02 NOTE — PROGRESS NOTES
0330 patient arrived to CCU. Primary Nurse Allie Tatum RN and Jolanta Sosa RN performed a dual skin assessment on this patient No impairment noted  Alban score is 15    Right lower extremity is discolored around the ankle. 0400 Assessment complete. Patient is intubated and sedated. Patient does withdrawal from pain. Pupils are equal and reactive to light. Lung sounds are clear. Bowel sounds are active but abdomin is distended. Pulses are palpable. PIV x2. Propofol infusing @ 30mcg/kg and Normal Saline infusing @ KVO. Huang draining clear yellow urine. Colonic decompression tube is in place. 1857 Propofol stopped to do SAT. 0630 Patient started coughing and immediately reaching for ETT. Attempted to orient patient. Patient only became more agitated. Respiratory rate increased.  Propofol started back @ 30mcg/kg    0700 Bedside verbal report given to Kaye Kirby RN

## 2017-07-02 NOTE — PROGRESS NOTES
1900: Bedside and verbal report received from JOSE EDUARDO Mayer.  7454: Assessment completed. Patient intubated, sedated and restrained. Propofol infusing at 50 mcg/kg/hr. 2320: Reassessment completed. 0400: Reassessment completed. 0545: Patient's HR increased to 150s. Rhythm between SVT/A-fib. Patient off sedation. Patient alert and following commands. No complaints of pain.   [id number]: Paged Dr. Alee Yoon.  Oliva Yoon.  8195: Dr. Alee Yoon paged back. New order for Cardizem drip and cardiology consult. 7023: Paged Dr. Cassie Pringle for new cardiology consult. 2853: Bedside and verbal report given to Zoya Dior [facility].

## 2017-07-02 NOTE — ROUTINE PROCESS
TRANSFER - OUT REPORT:    Verbal report given to Marie RN(name) on Fredrick Narayanan  being transferred to CCU(unit) for ordered procedure       Report consisted of patients Situation, Background, Assessment and   Recommendations(SBAR). Information from the following report(s) SBAR, Kardex, ED Summary, Procedure Summary and Intake/Output was reviewed with the receiving nurse. Lines:   Peripheral IV 07/01/17 Right Hand (Active)   Site Assessment Clean, dry, & intact 7/1/2017 10:01 PM   Phlebitis Assessment 0 7/1/2017 10:01 PM   Infiltration Assessment 0 7/1/2017 10:01 PM   Dressing Status Clean, dry, & intact 7/1/2017 10:01 PM   Hub Color/Line Status Green 7/1/2017 10:01 PM       Peripheral IV 07/02/17 Left Wrist (Active)   Site Assessment Clean, dry, & intact 7/2/2017 12:32 AM   Phlebitis Assessment 0 7/2/2017 12:32 AM   Infiltration Assessment 0 7/2/2017 12:32 AM   Dressing Status Clean, dry, & intact 7/2/2017 12:32 AM   Hub Color/Line Status Green 7/2/2017 12:32 AM        Opportunity for questions and clarification was provided.       Patient transported with:   Monitor  O2 @ 15 liters  Registered Nurse  [facility]

## 2017-07-02 NOTE — CONSULTS
[facility]  Pulmonary, Critical Care, and Sleep Medicine    Name: Ekaterina Holman MRN: [id number]   : 1940 Hospital: [facility]   Date: 2017        Critical Care Initial Patient Consult    IMPRESSION:   · Respiratory failure  · Sigmoid volvulus s/p emergent decompression  · SIRS  · Leucocytosis  · Lactic acidosis  · CKD  · Chronic steroids   · Low K      RECOMMENDATIONS:   · Ventilator support, wean as tolerated  · GI/Surgery following   · Empiric antibiotics   · Replete lytes  · Monitor renal function  · DVT and GI prophylaxis      Subjective/History: This patient has been seen and evaluated at the request of Dr. Ruchi Stafford for respiratory failure and sigmoid volvulus s/p emergent decompression. Patient is a 78 y.o. male admitted with abdominal distension and sigmoid volvulus. S/P emergent decompression. The patient is critically ill and can not provide additional history due to intubated     Past Medical History:   Diagnosis Date    Chronic kidney disease     Pacemaker       Past Surgical History:   Procedure Laterality Date    HX ORTHOPAEDIC      hip replacement, knee, back      Prior to Admission medications    Medication Sig Start Date End Date Taking? Authorizing Provider   ciprofloxacin HCl (CIPRO) 500 mg tablet Take 500 mg by mouth two (2) times a day. Oseas Chandler MD   dilTIAZem XR (DILACOR XR) 120 mg XR capsule Take 120 mg by mouth daily. Oseas Chandler MD   pregabalin (LYRICA) 100 mg capsule Take  by mouth two (2) times a day. Oseas Chandler MD   magnesium citrate solution Take 296 mL by mouth now for 1 dose. 17  Marcelina Andre MD   HYDROcodone-acetaminophen (NORCO) 5-325 mg per tablet Take 1 Tab by mouth every six (6) hours as needed for Pain. Max Daily Amount: 4 Tabs. 17   Marcelina Andre MD   predniSONE (DELTASONE) 20 mg tablet Take 30 mg by mouth daily (with breakfast).     Oseas Chandler MD   metoprolol succinate (TOPROL-XL) 50 mg XL tablet Take 1 Tab by mouth daily. 16   Artur Larios DO   omeprazole (PRILOSEC) 20 mg capsule Take 20 mg by mouth daily. Oseas Chandler MD   amLODIPine (NORVASC) 5 mg tablet Take 5 mg by mouth daily. Oseas Chandler MD   LORazepam (ATIVAN) 1 mg tablet Take  by mouth every four (4) hours as needed. Oseas Chandler MD   tamsulosin (FLOMAX) 0.4 mg capsule Take 0.4 mg by mouth daily. Oseas Chandler MD   finasteride (PROSCAR) 5 mg tablet Take 5 mg by mouth daily. Oseas Chandler MD     Current Facility-Administered Medications   Medication Dose Route Frequency    propofol (DIPRIVAN) infusion  5-50 mcg/kg/min IntraVENous TITRATE    chlorhexidine (PERIDEX) 0.12 % mouthwash 15 mL  15 mL Oral BID    mupirocin (BACTROBAN) 2 % ointment   Both Nostrils BID    piperacillin-tazobactam (ZOSYN) 3.375 g in 0.9% sodium chloride (MBP/ADV) 100 mL  3.375 g IntraVENous Q8H    ADDaptor        0.9% sodium chloride (MBP/ADV) 0.9 % infusion        piperacillin-tazobactam (ZOSYN) 3.375 gram injection        sodium chloride (NS) flush 5-10 mL  5-10 mL IntraVENous Q8H    potassium chloride 10 mEq in 100 ml IVPB  10 mEq IntraVENous Q1H     Allergies   Allergen Reactions    Augmentin [Amoxicillin-Pot Clavulanate] Rash    Ciprofloxacin Rash      Social History   Substance Use Topics    Smoking status: Never Smoker    Smokeless tobacco: Never Used    Alcohol use Yes      Comment: occationally      History reviewed. No pertinent family history.      Review of Systems:  Intubated     Objective:   Vital Signs:    Visit Vitals    /65    Pulse 84    Temp 98.4 °F (36.9 °C)    Resp 18    Ht 6' (1.829 m)    Wt 90.7 kg (199 lb 15.3 oz)    SpO2 98%    BMI 27.12 kg/m2       O2 Device: Ventilator   O2 Flow Rate (L/min): 2 l/min   Temp (24hrs), Av.5 °F (36.9 °C), Min:97.4 °F (36.3 °C), Max:99.1 °F (37.3 °C)       Intake/Output:   Last shift:      701 - 1900  In: 150   Out: 175 [Urine:125]  Last 3 shifts: 06/30 1901 - 07/02 0700  In: 64.3 [I.V.:64.3]  Out: 1100 [Urine:950]    Intake/Output Summary (Last 24 hours) at 07/02/17 1003  Last data filed at 07/02/17 0800   Gross per 24 hour   Intake           214.27 ml   Output             1275 ml   Net         -1060.73 ml         Physical Exam:    General:  Intubated and sedated    Head:  Normocephalic, without obvious abnormality, atraumatic. Eyes:  Conjunctivae/corneas clear. PERRL, EOMs intact. Nose: Nares normal. Septum midline. Mucosa normal. No drainage or sinus tenderness. Throat: Intubated   Neck: Supple, symmetrical, trachea midline, no adenopathy, thyroid: no enlargment/tenderness/nodules, no carotid bruit and no JVD. Back:   Symmetric, no curvature. ROM normal.   Lungs:   Clear to auscultation bilaterally. Chest wall:  No tenderness or deformity. Heart:  Regular rate and rhythm, S1, S2 normal, no murmur, click, rub or gallop. Abdomen:   Some distension   Extremities: Extremities normal, atraumatic, no cyanosis or edema. Pulses: 2+ and symmetric all extremities.    Skin: Skin color, texture, turgor normal. No rashes or lesions   Lymph nodes: Cervical, supraclavicular, and axillary nodes normal.   Neurologic: Intubated and sedated        Data:     Recent Results (from the past 24 hour(s))   EKG, 12 LEAD, INITIAL    Collection Time: 07/01/17  9:51 PM   Result Value Ref Range    Ventricular Rate 117 BPM    Atrial Rate 117 BPM    P-R Interval 136 ms    QRS Duration 150 ms    Q-T Interval 408 ms    QTC Calculation (Bezet) 569 ms    Calculated P Axis 10 degrees    Calculated R Axis -3 degrees    Calculated T Axis 118 degrees    Diagnosis       Sinus tachycardia with premature atrial complexes with aberrant conduction  Left bundle branch block  When compared with ECG of 30-JUN-2017 22:34,  aberrant conduction is now present  PA interval has decreased  T wave inversion less evident in Lateral leads     CBC WITH AUTOMATED DIFF Collection Time: 07/01/17 10:02 PM   Result Value Ref Range    WBC 16.0 (H) 4.1 - 11.1 K/uL    RBC 4.28 4.10 - 5.70 M/uL    HGB 14.4 12.1 - 17.0 g/dL    HCT 40.8 36.6 - 50.3 %    MCV 95.3 80.0 - 99.0 FL    MCH 33.6 26.0 - 34.0 PG    MCHC 35.3 30.0 - 36.5 g/dL    RDW 13.1 11.5 - 14.5 %    PLATELET 639 989 - 714 K/uL    NEUTROPHILS 88 (H) 32 - 75 %    LYMPHOCYTES 6 (L) 12 - 49 %    MONOCYTES 6 5 - 13 %    EOSINOPHILS 0 0 - 7 %    BASOPHILS 0 0 - 1 %    ABS. NEUTROPHILS 14.1 (H) 1.8 - 8.0 K/UL    ABS. LYMPHOCYTES 1.0 0.8 - 3.5 K/UL    ABS. MONOCYTES 0.9 0.0 - 1.0 K/UL    ABS. EOSINOPHILS 0.0 0.0 - 0.4 K/UL    ABS. BASOPHILS 0.0 0.0 - 0.1 K/UL   METABOLIC PANEL, COMPREHENSIVE    Collection Time: 07/01/17 10:02 PM   Result Value Ref Range    Sodium 135 (L) 136 - 145 mmol/L    Potassium 2.8 (L) 3.5 - 5.1 mmol/L    Chloride 101 97 - 108 mmol/L    CO2 23 21 - 32 mmol/L    Anion gap 11 5 - 15 mmol/L    Glucose 180 (H) 65 - 100 mg/dL    BUN 18 6 - 20 MG/DL    Creatinine 1.11 0.70 - 1.30 MG/DL    BUN/Creatinine ratio 16 12 - 20      GFR est AA >60 >60 ml/min/1.73m2    GFR est non-AA >60 >60 ml/min/1.73m2    Calcium 8.3 (L) 8.5 - 10.1 MG/DL    Bilirubin, total 0.7 0.2 - 1.0 MG/DL    ALT (SGPT) 21 12 - 78 U/L    AST (SGOT) 35 15 - 37 U/L    Alk.  phosphatase 88 45 - 117 U/L    Protein, total 7.3 6.4 - 8.2 g/dL    Albumin 3.7 3.5 - 5.0 g/dL    Globulin 3.6 2.0 - 4.0 g/dL    A-G Ratio 1.0 (L) 1.1 - 2.2     LACTIC ACID    Collection Time: 07/01/17 10:02 PM   Result Value Ref Range    Lactic acid 2.4 (HH) 0.4 - 2.0 MMOL/L   LIPASE    Collection Time: 07/01/17 10:02 PM   Result Value Ref Range    Lipase 81 73 - 393 U/L   CULTURE, BLOOD, PAIRED    Collection Time: 07/01/17 10:02 PM   Result Value Ref Range    Special Requests: NO SPECIAL REQUESTS      Culture result: NO GROWTH AFTER 9 HOURS     MAGNESIUM    Collection Time: 07/01/17 10:02 PM   Result Value Ref Range    Magnesium 3.2 (H) 1.6 - 2.4 mg/dL   PROTHROMBIN TIME + INR Collection Time: 07/02/17 12:42 AM   Result Value Ref Range    INR 1.3 (H) 0.9 - 1.1      Prothrombin time 12.7 (H) 9.0 - 11.1 sec   PTT    Collection Time: 07/02/17 12:42 AM   Result Value Ref Range    aPTT 30.1 22.1 - 32.5 sec    aPTT, therapeutic range     58.0 - 77.0 SECS   BLOOD GAS, ARTERIAL    Collection Time: 07/02/17 12:56 AM   Result Value Ref Range    pH 7.27 (L) 7.35 - 7.45      PCO2 52 (H) 35.0 - 45.0 mmHg    PO2 123 (H) 80 - 100 mmHg    O2 SAT 98 (H) 92 - 97 %    BICARBONATE 23 22 - 26 mmol/L    BASE DEFICIT 4.5 mmol/L    O2 METHOD VENTILATOR      FIO2 70 %    MODE A/C      Tidal volume 550      SET RATE 12      SPONTANEOUS RATE 12.0      EPAP/CPAP/PEEP 5.0      Sample source ARTERIAL      SITE LEFT RADIAL      GLEN'S TEST YES     URINALYSIS W/ REFLEX CULTURE    Collection Time: 07/02/17  1:02 AM   Result Value Ref Range    Color DARK YELLOW      Appearance CLEAR CLEAR      Specific gravity 1.025 1.003 - 1.030      pH (UA) 6.0 5.0 - 8.0      Protein 100 (A) NEG mg/dL    Glucose NEGATIVE  NEG mg/dL    Ketone 15 (A) NEG mg/dL    Blood MODERATE (A) NEG      Urobilinogen 1.0 0.2 - 1.0 EU/dL    Nitrites NEGATIVE  NEG      Leukocyte Esterase NEGATIVE  NEG      WBC 0-4 0 - 4 /hpf    RBC 0-5 0 - 5 /hpf    Epithelial cells FEW FEW /lpf    Bacteria NEGATIVE  NEG /hpf    UA:UC IF INDICATED CULTURE NOT INDICATED BY UA RESULT CNI      Hyaline cast 2-5 0 - 5 /lpf    Other: Renal Epithelial cells Present     BILIRUBIN, CONFIRM    Collection Time: 07/02/17  1:02 AM   Result Value Ref Range    Bilirubin UA, confirm NEGATIVE  NEG     LACTIC ACID    Collection Time: 07/02/17  1:51 AM   Result Value Ref Range    Lactic acid  0.4 - 2.0 MMOL/L     UNABLE TO OBTAIN ACCURATE RESULTS DUE TO GROSS LIPEMIA   BLOOD GAS, ARTERIAL    Collection Time: 07/02/17  4:30 AM   Result Value Ref Range    pH 7.36 7.35 - 7.45      PCO2 44 35.0 - 45.0 mmHg    PO2 88 80 - 100 mmHg    O2 SAT 96 92 - 97 %    BICARBONATE 24 22 - 26 mmol/L    BASE DEFICIT 1.3 mmol/L    O2 METHOD VENTILATOR      FIO2 50 %    MODE A/C      Tidal volume 500      SET RATE 14      EPAP/CPAP/PEEP 6.0      Sample source ARTERIAL      SITE RIGHT RADIAL      GLEN'S TEST YES     METABOLIC PANEL, BASIC    Collection Time: 07/02/17  4:38 AM   Result Value Ref Range    Sodium 138 136 - 145 mmol/L    Potassium 2.7 (LL) 3.5 - 5.1 mmol/L    Chloride 107 97 - 108 mmol/L    CO2 23 21 - 32 mmol/L    Anion gap 8 5 - 15 mmol/L    Glucose 142 (H) 65 - 100 mg/dL    BUN 15 6 - 20 MG/DL    Creatinine 0.75 0.70 - 1.30 MG/DL    BUN/Creatinine ratio 20 12 - 20      GFR est AA >60 >60 ml/min/1.73m2    GFR est non-AA >60 >60 ml/min/1.73m2    Calcium 6.8 (L) 8.5 - 10.1 MG/DL   CBC W/O DIFF    Collection Time: 07/02/17  4:38 AM   Result Value Ref Range    WBC 13.2 (H) 4.1 - 11.1 K/uL    RBC 3.69 (L) 4.10 - 5.70 M/uL    HGB 12.0 (L) 12.1 - 17.0 g/dL    HCT 35.4 (L) 36.6 - 50.3 %    MCV 95.9 80.0 - 99.0 FL    MCH 32.5 26.0 - 34.0 PG    MCHC 33.9 30.0 - 36.5 g/dL    RDW 13.3 11.5 - 14.5 %    PLATELET 518 731 - 310 K/uL   URINALYSIS W/ REFLEX CULTURE    Collection Time: 07/02/17  4:38 AM   Result Value Ref Range    Color YELLOW/STRAW      Appearance CLEAR CLEAR      Specific gravity 1.020 1.003 - 1.030      pH (UA) 6.0 5.0 - 8.0      Protein 100 (A) NEG mg/dL    Glucose NEGATIVE  NEG mg/dL    Ketone TRACE (A) NEG mg/dL    Blood MODERATE (A) NEG      Urobilinogen 1.0 0.2 - 1.0 EU/dL    Nitrites NEGATIVE  NEG      Leukocyte Esterase NEGATIVE  NEG      WBC 0-4 0 - 4 /hpf    RBC 10-20 0 - 5 /hpf    Epithelial cells FEW FEW /lpf    Bacteria NEGATIVE  NEG /hpf    UA:UC IF INDICATED CULTURE NOT INDICATED BY UA RESULT CNI     BILIRUBIN, CONFIRM    Collection Time: 07/02/17  4:38 AM   Result Value Ref Range    Bilirubin UA, confirm NEGATIVE  NEG     LACTIC ACID    Collection Time: 07/02/17  4:41 AM   Result Value Ref Range    Lactic acid 0.6 0.4 - 2.0 MMOL/L                 Imaging:  I have personally reviewed the patients radiographs and have reviewed the reports:  CXR bibasilar atelectasis        Total critical care time exclusive of procedures: 35 minutes  Kaveh Mansfield MD

## 2017-07-02 NOTE — PROGRESS NOTES
Returned to family now in CCU family consult room. Patient and wife are members of [employer]. Offered prayer prior to patient's procedure. Remained with family and escorted them to CCU waiting area after procedure. Consulted with CCU staff. Offered assurance of prayer and advised them of  availability.   LOUIE Hirsch, [address], [address] [place] [address] [address]     Paging Service  [phone]

## 2017-07-02 NOTE — H&P
Pre-endoscopy H and P    The patient was seen and examined in the room holding area. The airway was assessed and documented. The problem list, past medical history, and medications were reviewed. Patient Active Problem List   Diagnosis Code    Respiratory failure ([facility] J96.90     Social History     Social History    Marital status:      Spouse name: N/A    Number of children: N/A    Years of education: N/A     Occupational History    Not on file. Social History Main Topics    Smoking status: Never Smoker    Smokeless tobacco: Never Used    Alcohol use Yes      Comment: occationally    Drug use: No    Sexual activity: Not on file     Other Topics Concern    Not on file     Social History Narrative     Past Medical History:   Diagnosis Date    Chronic kidney disease     Pacemaker 2013         Prior to Admission Medications   Prescriptions Last Dose Informant Patient Reported? Taking? HYDROcodone-acetaminophen (NORCO) 5-325 mg per tablet   No No   Sig: Take 1 Tab by mouth every six (6) hours as needed for Pain. Max Daily Amount: 4 Tabs. LORazepam (ATIVAN) 1 mg tablet   Yes No   Sig: Take  by mouth every four (4) hours as needed. amLODIPine (NORVASC) 5 mg tablet   Yes No   Sig: Take 5 mg by mouth daily. ciprofloxacin HCl (CIPRO) 500 mg tablet   Yes No   Sig: Take 500 mg by mouth two (2) times a day. dilTIAZem XR (DILACOR XR) 120 mg XR capsule   Yes No   Sig: Take 120 mg by mouth daily. finasteride (PROSCAR) 5 mg tablet   Yes No   Sig: Take 5 mg by mouth daily. magnesium citrate solution   No No   Sig: Take 296 mL by mouth now for 1 dose. metoprolol succinate (TOPROL-XL) 50 mg XL tablet   No No   Sig: Take 1 Tab by mouth daily. omeprazole (PRILOSEC) 20 mg capsule   Yes No   Sig: Take 20 mg by mouth daily. predniSONE (DELTASONE) 20 mg tablet   Yes No   Sig: Take 30 mg by mouth daily (with breakfast).    pregabalin (LYRICA) 100 mg capsule   Yes No   Sig: Take  by mouth two (2) times a day. tamsulosin (FLOMAX) 0.4 mg capsule   Yes No   Sig: Take 0.4 mg by mouth daily. Facility-Administered Medications: None           The review of systems is:  intubated      I discussed with the patient's family the objectives, risks, consequences and alternatives to the procedure.       Very high risk procedure:     Maggie Snell MD  7/2/2017  2:29 AM

## 2017-07-02 NOTE — PROGRESS NOTES
07/02/17 0625   ABCDE Bundle   SBT Safety Screen Passed No   SBT Screen Reason for Failure Agitation; Increased inspiratory effort   SBT Trial Passed No   SBT Trial Reason for Failure Respiratory rate > 35   Pt placed back on previous vent settings

## 2017-07-02 NOTE — PROGRESS NOTES
Spiritual Care Assessment/Progress Notes    Caesar Sosa [id number]  [id number]    1940  78 y.o.  male    Patient Telephone Number: [phone] (home)   Orthodoxy Affiliation: Soniya Santos   Language: English   Extended Emergency Contact Information  Primary Emergency Contact: Saurabh Burnett  Address: [address] [place] [address] Gabriela Madrigal, [address] [facility] [address] Phone: [phone]  Relation: Spouse   Patient Active Problem List    Diagnosis Date Noted    Respiratory failure ([address] 07/02/2017        Date: 7/2/2017       Level of Orthodoxy/Spiritual Activity:  [x]         Involved in joe tradition/spiritual practice    []         Not involved in joe tradition/spiritual practice  [x]         Spiritually oriented    []         Claims no spiritual orientation    []         seeking spiritual identity  []         Feels alienated from Worship practice/tradition  []         Feels angry about Worship practice/tradition  [x]         Spirituality/Worship tradition is a resource for coping at this time.   [x]         Not able to assess due to medical condition    Services Provided Today:  []         crisis intervention    []         reading Scriptures  [x]         spiritual assessment    [x]         prayer  [x]         empathic listening/emotional support  []         rites and rituals (cite in comments)  []         life review     []         Worship support  []         theological development    []         advocacy  []         ethical dialog     []         blessing  []         bereavement support    [x]         support to family  []         anticipatory grief support   []         help with AMD  []         spiritual guidance    []         meditation      Spiritual Care Needs  [x]         Emotional Support  [x]         Spiritual/Orthodoxy Care  []         Loss/Adjustment  []         Advocacy/Referral                /Ethics  []         No needs expressed at               this time  []         Other: (note in               comments)  Spiritual Care Plan  []         Follow up visits with               pt/family  []         Provide materials  []         Schedule sacraments  []         Contact Community               Clergy  [x]         Follow up as needed  []         Other: (note in               comments)     Comments:  Responded to page to ER to support family of patient in crisis. Patient's wife and son Glorious Sarahsh were in family consult room. Patient had been intubated. Provided pastoral presence as doctor explained gravity of patient's condition and need for emergent procedure. Accompanied them to patient's room prior to procedure. ER staff request to visit another patient. Will follow up.   LOUIE Gonzales, [facility], [address] [place] [address] [address]     Paging Service  [phone]

## 2017-07-02 NOTE — PROGRESS NOTES
GI note:  Abdomen is much softer than what it was last night. X ray still shows distended loops of bowel. I suggest:    Aggressive K+ replacement,  Check mag and phos and correct if low. Continue rectal decompression tube. NGT to suction. Neostigmine after electrolyte correction and if KUB still shows distended colonic loops. D/w Dr Eric Nicolas. Abelardo Allen MD

## 2017-07-02 NOTE — H&P
Hospitalist Admission Note    NAME: Percy Ham   :  1940   MRN:  [id number]     Date/Time:  2017 1:29 AM    Patient PCP: Leland Salgado MD  ________________________________________________________________________    My assessment of this patient's clinical condition and my plan of care is as follows. Assessment / Plan:  Sigmoid volvulus with proximal colonic obstruction, severe colon distention  -GI planning to perform emergent endoscopic decompression now, high risk for perforation, general surgery is aware of patient  -no signs of perforation or ischemia on CT  -will continue empiric antibiotics    Severe sepsis  Acute respiratory failure requiring emergent intubation  -leukocytosis, tachycardic, lactate 2.4, consider GI source CT with above findings but negative for colitis or ischemia  -patient had sudden hypoxia and apnea, unclear etiology - consider aspiration, flash pulmonary edema, CXR with bilateral interstitial markings  -cont mechanical ventilation  -continue empiric zosyn  -f/u blood cultures, send urine culture, sputum  -BP control    Hypertension  -holding home meds for now   -hydralazine PRN    Unspecified kidney disease  -family unclear of exact diagnosis but know that patient has been taking prednisone for the last year and is currently being tapered to 10 mg daily      Code Status: Full  Surrogate Decision Maker: wife    DVT Prophylaxis: SCD  GI Prophylaxis: not indicated    Baseline: independent        Subjective:   CHIEF COMPLAINT: abdominal pain    HISTORY OF PRESENT ILLNESS:     Percy Ham is a 78 y.o.  male with history of CKD, per family patient is on prednisone chronically for his kidney disease, pacemaker for history of arrhythmia, hypertension who presents to ED with worsening abdominal pain and confusion. Patient has had abdominal pain and distention for last 2 days. Also recently prescribed cipro for UTI.  Presented to ED yesterday, was found to have significant colon distention and fecal stasis. His pain was improved and was discharged home with mag citrate, plan for enema, and norco. Patient returns today with new altered mental status, more confused needing more assistance,, loss of appetite and persistent abdominal pain. Family also noted some wheezing earlier in the day. No fevers or chills. Patient had been alert and oriented in ED, after returning from CT scan patient was much more confused, his face was purple and patient was emergently intubated. We were asked to admit for work up and evaluation of the above problems. Past Medical History:   Diagnosis Date    Chronic kidney disease     Pacemaker 2013        Past Surgical History:   Procedure Laterality Date    HX ORTHOPAEDIC      hip replacement, knee, back       Social History   Substance Use Topics    Smoking status: Never Smoker    Smokeless tobacco: Never Used    Alcohol use Yes      Comment: occationally        No family history of kidney disease    Allergies   Allergen Reactions    Augmentin [Amoxicillin-Pot Clavulanate] Rash    Ciprofloxacin Rash        Prior to Admission medications    Medication Sig Start Date End Date Taking? Authorizing Provider   ciprofloxacin HCl (CIPRO) 500 mg tablet Take 500 mg by mouth two (2) times a day. Oseas Chandler MD   dilTIAZem XR (DILACOR XR) 120 mg XR capsule Take 120 mg by mouth daily. Oseas Chandler MD   pregabalin (LYRICA) 100 mg capsule Take  by mouth two (2) times a day. Oseas Chandler MD   magnesium citrate solution Take 296 mL by mouth now for 1 dose. 7/1/17 7/1/17  Aminah Ch MD   HYDROcodone-acetaminophen (NORCO) 5-325 mg per tablet Take 1 Tab by mouth every six (6) hours as needed for Pain. Max Daily Amount: 4 Tabs. 6/30/17   Aminah Ch MD   predniSONE (DELTASONE) 20 mg tablet Take 30 mg by mouth daily (with breakfast). Oseas Chandler MD   metoprolol succinate (TOPROL-XL) 50 mg XL tablet Take 1 Tab by mouth daily. 11/5/16   Yamila Rose DO   omeprazole (PRILOSEC) 20 mg capsule Take 20 mg by mouth daily. Oseas Chandler MD   amLODIPine (NORVASC) 5 mg tablet Take 5 mg by mouth daily. Oseas Chandler MD   LORazepam (ATIVAN) 1 mg tablet Take  by mouth every four (4) hours as needed. Oseas Chandler MD   tamsulosin (FLOMAX) 0.4 mg capsule Take 0.4 mg by mouth daily. Oseas Chandler MD   finasteride (PROSCAR) 5 mg tablet Take 5 mg by mouth daily. Oseas Chandler MD       REVIEW OF SYSTEMS:     I am not able to complete the review of systems because:   x The patient is intubated and sedated    The patient has altered mental status due to his acute medical problems    The patient has baseline aphasia from prior stroke(s)    The patient has baseline dementia and is not reliable historian    The patient is in acute medical distress and unable to provide information           Objective:   VITALS:    Patient Vitals for the past 12 hrs:   Temp Pulse Resp BP SpO2   07/02/17 0115 - (!) 108 10 (!) 184/97 98 %   07/02/17 0100 - (!) 102 (!) 0 174/85 100 %   07/02/17 0045 - (!) 101 9 159/73 99 %   07/02/17 0030 - (!) 115 12 153/76 99 %   07/02/17 0026 - (!) 131 29 167/77 91 %   07/02/17 0025 - (!) 111 12 - 99 %   07/02/17 0019 - 95 27 - (!) 70 %   07/02/17 0017 - - - 195/70 -   07/01/17 2330 - (!) 114 27 167/87 98 %   07/01/17 2300 - (!) 102 23 153/75 97 %   07/01/17 2230 - (!) 113 23 146/89 97 %   07/01/17 2205 - - - - 100 %   07/01/17 2144 97.4 °F (36.3 °C) (!) 126 24 124/70 94 %         PHYSICAL EXAM:    General:    Intubated, not responsive    HEENT: Atraumatic, anicteric sclerae, pink conjunctivae     ET tube in place  Neck:  Supple, symmetrical  Lungs:   Clear to auscultation bilaterally, no wheezing, rhonchi, or rales. Chest wall:  No tenderness, no accessory muscle use.   Heart:   Regular rhythm, no murmur, no edema  Abdomen:   Soft, +significantly distended and tympanic  Extremities: No cyanosis, no clubbing, warm, well perfused, radial pulse 2+  Skin:     Not pale, not jaundiced, no rashes   Psych:  Sedated, not agitated  Neurologic: Not responsive    _______________________________________________________________________  Care Plan discussed with:    Comments   Patient x    Family  x    RN x    Care Manager                    Consultant:  x ED, surgery   _______________________________________________________________________  Expected  Disposition:   Home with Family    HH/PT/OT/RN    SNF/LTC    [facility]    ________________________________________________________________________  TOTAL TIME:  79 Minutes    Critical Care Provided     Minutes non procedure based      Comments     Reviewed previous records   >50% of visit spent in counseling and coordination of care  Discussion with patient and/or family and questions answered       ________________________________________________________________________  Gage Reese MD    Procedures: see electronic medical records for all procedures/Xrays and details which were not copied into this note but were reviewed prior to creation of Plan.     LAB DATA REVIEWED:    Recent Results (from the past 24 hour(s))   EKG, 12 LEAD, INITIAL    Collection Time: 07/01/17  9:51 PM   Result Value Ref Range    Ventricular Rate 117 BPM    Atrial Rate 117 BPM    P-R Interval 136 ms    QRS Duration 150 ms    Q-T Interval 408 ms    QTC Calculation (Bezet) 569 ms    Calculated P Axis 10 degrees    Calculated R Axis -3 degrees    Calculated T Axis 118 degrees    Diagnosis       Sinus tachycardia with premature atrial complexes with aberrant conduction  Left bundle branch block  When compared with ECG of 30-JUN-2017 22:34,  aberrant conduction is now present  HI interval has decreased  T wave inversion less evident in Lateral leads     CBC WITH AUTOMATED DIFF    Collection Time: 07/01/17 10:02 PM   Result Value Ref Range    WBC 16.0 (H) 4.1 - 11.1 K/uL    RBC 4.28 4.10 - 5.70 M/uL    HGB 14.4 12.1 - 17.0 g/dL    HCT 40.8 36.6 - 50.3 %    MCV 95.3 80.0 - 99.0 FL    MCH 33.6 26.0 - 34.0 PG    MCHC 35.3 30.0 - 36.5 g/dL    RDW 13.1 11.5 - 14.5 %    PLATELET 173 570 - 058 K/uL    NEUTROPHILS 88 (H) 32 - 75 %    LYMPHOCYTES 6 (L) 12 - 49 %    MONOCYTES 6 5 - 13 %    EOSINOPHILS 0 0 - 7 %    BASOPHILS 0 0 - 1 %    ABS. NEUTROPHILS 14.1 (H) 1.8 - 8.0 K/UL    ABS. LYMPHOCYTES 1.0 0.8 - 3.5 K/UL    ABS. MONOCYTES 0.9 0.0 - 1.0 K/UL    ABS. EOSINOPHILS 0.0 0.0 - 0.4 K/UL    ABS. BASOPHILS 0.0 0.0 - 0.1 K/UL   METABOLIC PANEL, COMPREHENSIVE    Collection Time: 07/01/17 10:02 PM   Result Value Ref Range    Sodium 135 (L) 136 - 145 mmol/L    Potassium 2.8 (L) 3.5 - 5.1 mmol/L    Chloride 101 97 - 108 mmol/L    CO2 23 21 - 32 mmol/L    Anion gap 11 5 - 15 mmol/L    Glucose 180 (H) 65 - 100 mg/dL    BUN 18 6 - 20 MG/DL    Creatinine 1.11 0.70 - 1.30 MG/DL    BUN/Creatinine ratio 16 12 - 20      GFR est AA >60 >60 ml/min/1.73m2    GFR est non-AA >60 >60 ml/min/1.73m2    Calcium 8.3 (L) 8.5 - 10.1 MG/DL    Bilirubin, total 0.7 0.2 - 1.0 MG/DL    ALT (SGPT) 21 12 - 78 U/L    AST (SGOT) 35 15 - 37 U/L    Alk.  phosphatase 88 45 - 117 U/L    Protein, total 7.3 6.4 - 8.2 g/dL    Albumin 3.7 3.5 - 5.0 g/dL    Globulin 3.6 2.0 - 4.0 g/dL    A-G Ratio 1.0 (L) 1.1 - 2.2     LACTIC ACID    Collection Time: 07/01/17 10:02 PM   Result Value Ref Range    Lactic acid 2.4 (HH) 0.4 - 2.0 MMOL/L   LIPASE    Collection Time: 07/01/17 10:02 PM   Result Value Ref Range    Lipase 81 73 - 393 U/L   MAGNESIUM    Collection Time: 07/01/17 10:02 PM   Result Value Ref Range    Magnesium 3.2 (H) 1.6 - 2.4 mg/dL   PROTHROMBIN TIME + INR    Collection Time: 07/02/17 12:42 AM   Result Value Ref Range    INR 1.3 (H) 0.9 - 1.1      Prothrombin time 12.7 (H) 9.0 - 11.1 sec   PTT    Collection Time: 07/02/17 12:42 AM   Result Value Ref Range    aPTT 30.1 22.1 - 32.5 sec    aPTT, therapeutic range     58.0 - 77.0 SECS   BLOOD GAS, ARTERIAL    Collection Time: 07/02/17 12:56 AM   Result Value Ref Range    pH 7.27 (L) 7.35 - 7.45      PCO2 52 (H) 35.0 - 45.0 mmHg    PO2 123 (H) 80 - 100 mmHg    O2 SAT 98 (H) 92 - 97 %    BICARBONATE 23 22 - 26 mmol/L    BASE DEFICIT 4.5 mmol/L    O2 METHOD VENTILATOR      FIO2 70 %    MODE A/C      Tidal volume 550      SET RATE 12      SPONTANEOUS RATE 12.0      EPAP/CPAP/PEEP 5.0      Sample source ARTERIAL      SITE LEFT RADIAL      GLEN'S TEST YES     URINALYSIS W/ REFLEX CULTURE    Collection Time: 07/02/17  1:02 AM   Result Value Ref Range    Color DARK YELLOW      Appearance CLEAR CLEAR      Specific gravity 1.025 1.003 - 1.030      pH (UA) 6.0 5.0 - 8.0      Protein 100 (A) NEG mg/dL    Glucose NEGATIVE  NEG mg/dL    Ketone 15 (A) NEG mg/dL    Blood MODERATE (A) NEG      Urobilinogen 1.0 0.2 - 1.0 EU/dL    Nitrites NEGATIVE  NEG      Leukocyte Esterase NEGATIVE  NEG      WBC PENDING /hpf    RBC PENDING /hpf    Epithelial cells PENDING /lpf    Bacteria PENDING /hpf    UA:UC IF INDICATED PENDING    BILIRUBIN, CONFIRM    Collection Time: 07/02/17  1:02 AM   Result Value Ref Range    Bilirubin UA, confirm NEGATIVE  NEG

## 2017-07-02 NOTE — CONSULTS
Gastroenterology Consult Note  NAME: Caesar Sosa : 1940 MRN: [id number]   ATTG: [unfilled] PCP: Carmela Brown MD  Date/Time:  2017 3:03 AM  Subjective:   REASON FOR CONSULT:      Charles Greer is a 78 y.o.  male who I was asked to see for massively dilated colon with a sigmoid volvulus. He has a hx of CKD and pacemaker who presents  c/o constant abdominal pain and distention x 2 + days. + constipation for weeks. Family also notes fatigue, confusion, and loss of appetite. Family report that he had been talking less than baseline and unable to communicate. CT was done and after CT he worsened and was emergently intubated. I was called by Dr Jaspreet Cao and texted by Dr Abril Keen for an emergent colonic decompression. WBC high, Lactic acid was elevated. Primary GI: Dr Pam Tena in [facility]. CT shows: There is volvulus with mesenteric twisting in the right lower abdomen,  with the sigmoid and probably a large portion of the left colon included in the  volvulus. The remainder of the colon is extremely distended with a large amount  of retained fecal material due to associated obstruction. There is no  perforation or rupture. Past Medical History:   Diagnosis Date    Chronic kidney disease     Pacemaker       Past Surgical History:   Procedure Laterality Date    HX ORTHOPAEDIC      hip replacement, knee, back     Social History   Substance Use Topics    Smoking status: Never Smoker    Smokeless tobacco: Never Used    Alcohol use Yes      Comment: occationally      History reviewed. No pertinent family history. Allergies   Allergen Reactions    Augmentin [Amoxicillin-Pot Clavulanate] Rash    Ciprofloxacin Rash      Home Medications:  Prior to Admission Medications   Prescriptions Last Dose Informant Patient Reported? Taking? HYDROcodone-acetaminophen (NORCO) 5-325 mg per tablet   No No   Sig: Take 1 Tab by mouth every six (6) hours as needed for Pain. Max Daily Amount: 4 Tabs. LORazepam (ATIVAN) 1 mg tablet   Yes No   Sig: Take  by mouth every four (4) hours as needed. amLODIPine (NORVASC) 5 mg tablet   Yes No   Sig: Take 5 mg by mouth daily. ciprofloxacin HCl (CIPRO) 500 mg tablet   Yes No   Sig: Take 500 mg by mouth two (2) times a day. dilTIAZem XR (DILACOR XR) 120 mg XR capsule   Yes No   Sig: Take 120 mg by mouth daily. finasteride (PROSCAR) 5 mg tablet   Yes No   Sig: Take 5 mg by mouth daily. magnesium citrate solution   No No   Sig: Take 296 mL by mouth now for 1 dose. metoprolol succinate (TOPROL-XL) 50 mg XL tablet   No No   Sig: Take 1 Tab by mouth daily. omeprazole (PRILOSEC) 20 mg capsule   Yes No   Sig: Take 20 mg by mouth daily. predniSONE (DELTASONE) 20 mg tablet   Yes No   Sig: Take 30 mg by mouth daily (with breakfast). pregabalin (LYRICA) 100 mg capsule   Yes No   Sig: Take  by mouth two (2) times a day. tamsulosin (FLOMAX) 0.4 mg capsule   Yes No   Sig: Take 0.4 mg by mouth daily. Facility-Administered Medications: None     Hospital medications:  Current Facility-Administered Medications   Medication Dose Route Frequency    propofol (DIPRIVAN) infusion  5-50 mcg/kg/min IntraVENous TITRATE    piperacillin-tazobactam (ZOSYN) 3.375 g in 0.9% sodium chloride (MBP/ADV) 100 mL  3.375 g IntraVENous Q8H    ADDaptor        0.9% sodium chloride (MBP/ADV) 0.9 % infusion        piperacillin-tazobactam (ZOSYN) 3.375 gram injection        potassium chloride 10 mEq in 100 ml IVPB  10 mEq IntraVENous Q1H     Current Outpatient Prescriptions   Medication Sig    ciprofloxacin HCl (CIPRO) 500 mg tablet Take 500 mg by mouth two (2) times a day.  dilTIAZem XR (DILACOR XR) 120 mg XR capsule Take 120 mg by mouth daily.  pregabalin (LYRICA) 100 mg capsule Take  by mouth two (2) times a day.  HYDROcodone-acetaminophen (NORCO) 5-325 mg per tablet Take 1 Tab by mouth every six (6) hours as needed for Pain. Max Daily Amount: 4 Tabs.     predniSONE (DELTASONE) 20 mg tablet Take 30 mg by mouth daily (with breakfast).  metoprolol succinate (TOPROL-XL) 50 mg XL tablet Take 1 Tab by mouth daily.  omeprazole (PRILOSEC) 20 mg capsule Take 20 mg by mouth daily.  amLODIPine (NORVASC) 5 mg tablet Take 5 mg by mouth daily.  LORazepam (ATIVAN) 1 mg tablet Take  by mouth every four (4) hours as needed.  tamsulosin (FLOMAX) 0.4 mg capsule Take 0.4 mg by mouth daily.  finasteride (PROSCAR) 5 mg tablet Take 5 mg by mouth daily. REVIEW OF SYSTEMS:     [x]     Unable to obtain  ROS due to  [x]    mental status change  []    sedated   [x]    intubated   []    Total of 11 systems reviewed as follows:  Const:  negative fever, negative chills, negative weight loss  Eyes:   negative diplopia or visual changes, negative eye pain  ENT:   negative coryza, negative sore throat  Resp:   negative cough, hemoptysis, dyspnea  Cards:  negative for chest pain, palpitations, lower extremity edema  :  negative for frequency, dysuria and hematuria  Skin:   negative for rash and pruritus  Heme:  negative for easy bruising and gum/nose bleeding  MS:  negative for myalgias, arthralgias, back pain and muscle weakness  Neurolo:  negative for headaches, dizziness, vertigo, memory problems   Psych:  negative for feelings of anxiety, depression     Pertinent Positives include :    Objective:   VITALS:    Visit Vitals    BP (!) 181/123    Pulse (!) 107    Temp 97.4 °F (36.3 °C)    Resp 12    Ht 6' (1.829 m)    Wt 89.4 kg (197 lb)    SpO2 98%    BMI 26.72 kg/m2     Temp (24hrs), Av.4 °F (36.3 °C), Min:97.4 °F (36.3 °C), Max:97.4 °F (36.3 °C)    PHYSICAL EXAM:     General: intubated,sedated  Eyes: NA  ENMT: Lips,  unremarkable.    Chest:  breath sounds are normal   Heart: Heart sounds normal,tachcardia S1,S2  Abdomen: massively distended, no BS  Lymphatic: NA  Neurologic: intubated  Psyc: as above   Extremities: No edema       LAB DATA REVIEWED:    Recent Results (from the past 48 hour(s))   CBC W/O DIFF    Collection Time: 06/30/17  9:06 PM   Result Value Ref Range    WBC 5.5 4.1 - 11.1 K/uL    RBC 3.95 (L) 4.10 - 5.70 M/uL    HGB 13.2 12.1 - 17.0 g/dL    HCT 38.0 36.6 - 50.3 %    MCV 96.2 80.0 - 99.0 FL    MCH 33.4 26.0 - 34.0 PG    MCHC 34.7 30.0 - 36.5 g/dL    RDW 12.9 11.5 - 14.5 %    PLATELET 334 107 - 083 K/uL   METABOLIC PANEL, COMPREHENSIVE    Collection Time: 06/30/17  9:06 PM   Result Value Ref Range    Sodium 132 (L) 136 - 145 mmol/L    Potassium 3.1 (L) 3.5 - 5.1 mmol/L    Chloride 97 97 - 108 mmol/L    CO2 24 21 - 32 mmol/L    Anion gap 11 5 - 15 mmol/L    Glucose 120 (H) 65 - 100 mg/dL    BUN 10 6 - 20 MG/DL    Creatinine 0.97 0.70 - 1.30 MG/DL    BUN/Creatinine ratio 10 (L) 12 - 20      GFR est AA >60 >60 ml/min/1.73m2    GFR est non-AA >60 >60 ml/min/1.73m2    Calcium 8.3 (L) 8.5 - 10.1 MG/DL    Bilirubin, total 0.6 0.2 - 1.0 MG/DL    ALT (SGPT) 19 12 - 78 U/L    AST (SGOT) 21 15 - 37 U/L    Alk.  phosphatase 82 45 - 117 U/L    Protein, total 7.2 6.4 - 8.2 g/dL    Albumin 3.7 3.5 - 5.0 g/dL    Globulin 3.5 2.0 - 4.0 g/dL    A-G Ratio 1.1 1.1 - 2.2     LIPASE    Collection Time: 06/30/17  9:06 PM   Result Value Ref Range    Lipase 127 73 - 393 U/L   URINALYSIS W/ REFLEX CULTURE    Collection Time: 06/30/17 10:32 PM   Result Value Ref Range    Color YELLOW/STRAW      Appearance CLOUDY (A) CLEAR      Specific gravity 1.022 1.003 - 1.030      pH (UA) 5.5 5.0 - 8.0      Protein 30 (A) NEG mg/dL    Glucose NEGATIVE  NEG mg/dL    Ketone 40 (A) NEG mg/dL    Blood NEGATIVE  NEG      Urobilinogen 1.0 0.2 - 1.0 EU/dL    Nitrites NEGATIVE  NEG      Leukocyte Esterase NEGATIVE  NEG      WBC 5-10 0 - 4 /hpf    RBC 5-10 0 - 5 /hpf    Epithelial cells FEW FEW /lpf    Bacteria 1+ (A) NEG /hpf    UA:UC IF INDICATED URINE CULTURE ORDERED (A) CNI     BILIRUBIN, CONFIRM    Collection Time: 06/30/17 10:32 PM   Result Value Ref Range    Bilirubin UA, confirm NEGATIVE  NEG     EKG, 12 LEAD, INITIAL    Collection Time: 06/30/17 10:34 PM   Result Value Ref Range    Ventricular Rate 86 BPM    Atrial Rate 86 BPM    P-R Interval 218 ms    QRS Duration 160 ms    Q-T Interval 434 ms    QTC Calculation (Bezet) 519 ms    Calculated P Axis 86 degrees    Calculated R Axis 7 degrees    Calculated T Axis 121 degrees    Diagnosis       Sinus rhythm with 1st degree AV block  Left bundle branch block  When compared with ECG of 05-NOV-2016 10:07,  PA interval has increased  Confirmed by Loni Soliz ([id number]) on 7/1/2017 3:25:50 PM     EKG, 12 LEAD, INITIAL    Collection Time: 07/01/17  9:51 PM   Result Value Ref Range    Ventricular Rate 117 BPM    Atrial Rate 117 BPM    P-R Interval 136 ms    QRS Duration 150 ms    Q-T Interval 408 ms    QTC Calculation (Bezet) 569 ms    Calculated P Axis 10 degrees    Calculated R Axis -3 degrees    Calculated T Axis 118 degrees    Diagnosis       Sinus tachycardia with premature atrial complexes with aberrant conduction  Left bundle branch block  When compared with ECG of 30-JUN-2017 22:34,  aberrant conduction is now present  PA interval has decreased  T wave inversion less evident in Lateral leads     CBC WITH AUTOMATED DIFF    Collection Time: 07/01/17 10:02 PM   Result Value Ref Range    WBC 16.0 (H) 4.1 - 11.1 K/uL    RBC 4.28 4.10 - 5.70 M/uL    HGB 14.4 12.1 - 17.0 g/dL    HCT 40.8 36.6 - 50.3 %    MCV 95.3 80.0 - 99.0 FL    MCH 33.6 26.0 - 34.0 PG    MCHC 35.3 30.0 - 36.5 g/dL    RDW 13.1 11.5 - 14.5 %    PLATELET 746 800 - 694 K/uL    NEUTROPHILS 88 (H) 32 - 75 %    LYMPHOCYTES 6 (L) 12 - 49 %    MONOCYTES 6 5 - 13 %    EOSINOPHILS 0 0 - 7 %    BASOPHILS 0 0 - 1 %    ABS. NEUTROPHILS 14.1 (H) 1.8 - 8.0 K/UL    ABS. LYMPHOCYTES 1.0 0.8 - 3.5 K/UL    ABS. MONOCYTES 0.9 0.0 - 1.0 K/UL    ABS. EOSINOPHILS 0.0 0.0 - 0.4 K/UL    ABS.  BASOPHILS 0.0 0.0 - 0.1 K/UL   METABOLIC PANEL, COMPREHENSIVE    Collection Time: 07/01/17 10:02 PM   Result Value Ref Range    Sodium 135 (L) 136 - 145 mmol/L    Potassium 2.8 (L) 3.5 - 5.1 mmol/L    Chloride 101 97 - 108 mmol/L    CO2 23 21 - 32 mmol/L    Anion gap 11 5 - 15 mmol/L    Glucose 180 (H) 65 - 100 mg/dL    BUN 18 6 - 20 MG/DL    Creatinine 1.11 0.70 - 1.30 MG/DL    BUN/Creatinine ratio 16 12 - 20      GFR est AA >60 >60 ml/min/1.73m2    GFR est non-AA >60 >60 ml/min/1.73m2    Calcium 8.3 (L) 8.5 - 10.1 MG/DL    Bilirubin, total 0.7 0.2 - 1.0 MG/DL    ALT (SGPT) 21 12 - 78 U/L    AST (SGOT) 35 15 - 37 U/L    Alk.  phosphatase 88 45 - 117 U/L    Protein, total 7.3 6.4 - 8.2 g/dL    Albumin 3.7 3.5 - 5.0 g/dL    Globulin 3.6 2.0 - 4.0 g/dL    A-G Ratio 1.0 (L) 1.1 - 2.2     LACTIC ACID    Collection Time: 07/01/17 10:02 PM   Result Value Ref Range    Lactic acid 2.4 (HH) 0.4 - 2.0 MMOL/L   LIPASE    Collection Time: 07/01/17 10:02 PM   Result Value Ref Range    Lipase 81 73 - 393 U/L   MAGNESIUM    Collection Time: 07/01/17 10:02 PM   Result Value Ref Range    Magnesium 3.2 (H) 1.6 - 2.4 mg/dL   PROTHROMBIN TIME + INR    Collection Time: 07/02/17 12:42 AM   Result Value Ref Range    INR 1.3 (H) 0.9 - 1.1      Prothrombin time 12.7 (H) 9.0 - 11.1 sec   PTT    Collection Time: 07/02/17 12:42 AM   Result Value Ref Range    aPTT 30.1 22.1 - 32.5 sec    aPTT, therapeutic range     58.0 - 77.0 SECS   BLOOD GAS, ARTERIAL    Collection Time: 07/02/17 12:56 AM   Result Value Ref Range    pH 7.27 (L) 7.35 - 7.45      PCO2 52 (H) 35.0 - 45.0 mmHg    PO2 123 (H) 80 - 100 mmHg    O2 SAT 98 (H) 92 - 97 %    BICARBONATE 23 22 - 26 mmol/L    BASE DEFICIT 4.5 mmol/L    O2 METHOD VENTILATOR      FIO2 70 %    MODE A/C      Tidal volume 550      SET RATE 12      SPONTANEOUS RATE 12.0      EPAP/CPAP/PEEP 5.0      Sample source ARTERIAL      SITE LEFT RADIAL      GLEN'S TEST YES     URINALYSIS W/ REFLEX CULTURE    Collection Time: 07/02/17  1:02 AM   Result Value Ref Range    Color DARK YELLOW      Appearance CLEAR CLEAR      Specific gravity 1.025 1.003 - 1.030      pH (UA) 6.0 5.0 - 8.0      Protein 100 (A) NEG mg/dL    Glucose NEGATIVE  NEG mg/dL    Ketone 15 (A) NEG mg/dL    Blood MODERATE (A) NEG      Urobilinogen 1.0 0.2 - 1.0 EU/dL    Nitrites NEGATIVE  NEG      Leukocyte Esterase NEGATIVE  NEG      WBC 0-4 0 - 4 /hpf    RBC 0-5 0 - 5 /hpf    Epithelial cells FEW FEW /lpf    Bacteria NEGATIVE  NEG /hpf    UA:UC IF INDICATED CULTURE NOT INDICATED BY UA RESULT CNI      Hyaline cast 2-5 0 - 5 /lpf    Other: Renal Epithelial cells Present     BILIRUBIN, CONFIRM    Collection Time: 07/02/17  1:02 AM   Result Value Ref Range    Bilirubin UA, confirm NEGATIVE  NEG       IMAGING RESULTS:   [x]      I have personally reviewed the actual   []    CXR  [x]    CT  []     US    Recommendations/Plan:     Colonic distention with impending perforation,  Pain in abdomen from above,  Change in MS,  Abnormal CT,  Leukocytosis,  Lactic acidosis    Active Problems:    Respiratory failure ([address] (7/2/2017)       ___________________________________________________  RECOMMENDATIONS:      I spoke with the family in detail and will do a bedside emergent colonic decompression. High risk procedure. Family is fully aware. Surgery(Dr Keri Price) is on stand by for worsening or for perforation. C/w IVF and IV antibiotics  We will proceed with above. Prior to the procedure its objectives, risks, consequences and alternatives were discussed with the patient's family who then elected to proceed. Thank you for entrusting me with this patient's care. Please do not hesitate to contact me with any questions or if I can be of assistance with this patient or any of your other patients' GI needs. Discussed Code Status:    [x]    Full Code      []    DNR    ___________________________________________________  Care Plan discussed with:    [x]    Patient   [x]    Family   [x]    Nursing   [x]    Attending     ___________________________________________________  GI: Boyd Crisostomo MD

## 2017-07-02 NOTE — PROCEDURES
Colonoscopy Procedure Note    Feli Monroy  1940  [id number]    Indications:  pain in abdomen, abnormal CT showing volvulus     Pre-operative Diagnosis: colon decompression    Post-operative Diagnosis: * No post-op diagnosis entered *      : Mar Reddy. Ladonna Sherwood MD    Referring Provider: Timbo Sen MD    Sedation:  Propofol        Procedure Details:    After detailed informed consent was obtained with all risks and benefits of procedure explained and preoperative exam completed,  the Olympus videocolonoscope  was inserted in the rectum and carefully advanced to the 70 cm. The quality of preparation was poor. Findings:     · The colon is massively dilated. It keeps coiling as the sigmoid is massively dilated. · Early dusky mucosal changes with adherent caking of stool stuck to the walls is noted above it. · There is liquid with stool mix that does not allow further scope passage. · The scope was advanced to 80 cm and the air suctioned out  (possibly up to descending colon). · A wire was advanced above it and a 10 Fr Decompression tube was placed over it. · His BP improved and abdomen was less firm at the end of the case. Therapies:  Colonic decompression    Specimen:  none        Complications: None were encountered during the procedure. EBL:  None. Recommendations:     -Get KUB to compare colonic diameter size. -IV antibiotics,  -Admit to ICU. -Keep colonic decompression tube in  -For recurrence of volvulus, he would need a surgical intervention.  -Discussed with family and ER MD (Dr Madelaine Maynard). Abelardo Sherwood MD  7/2/2017  2:53 AM

## 2017-07-02 NOTE — PROGRESS NOTES
0715 Report received from [facility]. 0800 Assessment completed. Patient cleaned of medium loose bowel movement. Desitin ordered to protect skin from breakdown. Primary Nurse Claritza Agosto, RN and Arline WHITT, RN performed a dual skin assessment on this patient No impairment noted  Alban score is 14      Restraints  0953  Restraint type: Soft restraint:  right wrist and left wrist  Reason for restraints: Interference with medical treatment  Duration: 24 hours  Restraints must be removed when an alternative is available and effective and/or patient no longer meets criteria. Orders must be renewed every calendar day or when discontinued. The MD must conduct a face to face assessment within 1 calendar day of initiation when initial restraint order is verbal.    1750 Informed Dr. Bhagat Poster of potassium of 3.3 and he had 3 medium loose bowel movements. 6 runs of potassium 10meq in 100ml as he only has 2 PIV. Then 40 of lasix for edema in the lungs. 0715 Bedside report given to Carole WHITT.

## 2017-07-02 NOTE — PROGRESS NOTES
Patient admitted early this AM by my partner. Chart reviewed. S/p colonic decompression.   Discussed case with Dr Steph Soto  -awaiting for gen surg's evaluation  -K 2.7, needs aggressive repletion  -will need better access, likely will need CVC vs PICC  -IV lasix after repletion of KCl  -continue empiric abx

## 2017-07-03 ENCOUNTER — APPOINTMENT (OUTPATIENT)
Dept: GENERAL RADIOLOGY | Age: 77
DRG: 871 | End: 2017-07-03
Attending: INTERNAL MEDICINE
Payer: MEDICARE

## 2017-07-03 LAB
ANION GAP BLD CALC-SCNC: 11 MMOL/L (ref 5–15)
ARTERIAL PATENCY WRIST A: ABNORMAL
ARTERIAL PATENCY WRIST A: YES
BACTERIA SPEC CULT: ABNORMAL
BASE DEFICIT BLDA-SCNC: 0.4 MMOL/L
BASE DEFICIT BLDA-SCNC: 1.4 MMOL/L
BASOPHILS # BLD AUTO: 0 K/UL (ref 0–0.1)
BASOPHILS # BLD: 0 % (ref 0–1)
BDY SITE: ABNORMAL
BDY SITE: ABNORMAL
BUN SERPL-MCNC: 16 MG/DL (ref 6–20)
BUN/CREAT SERPL: 16 (ref 12–20)
CALCIUM SERPL-MCNC: 7.3 MG/DL (ref 8.5–10.1)
CC UR VC: ABNORMAL
CHLORIDE SERPL-SCNC: 104 MMOL/L (ref 97–108)
CK MB CFR SERPL CALC: 0.8 % (ref 0–2.5)
CK MB SERPL-MCNC: 1.8 NG/ML (ref 5–25)
CK SERPL-CCNC: 225 U/L (ref 39–308)
CO2 SERPL-SCNC: 22 MMOL/L (ref 21–32)
CREAT SERPL-MCNC: 1.01 MG/DL (ref 0.7–1.3)
DIFFERENTIAL METHOD BLD: ABNORMAL
EOSINOPHIL # BLD: 0 K/UL (ref 0–0.4)
EOSINOPHIL NFR BLD: 0 % (ref 0–7)
EPAP/CPAP/PEEP, PAPEEP: 6
EPAP/CPAP/PEEP, PAPEEP: 6
ERYTHROCYTE [DISTWIDTH] IN BLOOD BY AUTOMATED COUNT: 13.6 % (ref 11.5–14.5)
FIO2 ON VENT: 30 %
FIO2 ON VENT: 40 %
GAS FLOW.O2 SETTING OXYMISER: 14 L/MIN
GLUCOSE SERPL-MCNC: 72 MG/DL (ref 65–100)
HCO3 BLDA-SCNC: 22 MMOL/L (ref 22–26)
HCO3 BLDA-SCNC: 23 MMOL/L (ref 22–26)
HCT VFR BLD AUTO: 36.9 % (ref 36.6–50.3)
HGB BLD-MCNC: 12.3 G/DL (ref 12.1–17)
LACTATE SERPL-SCNC: 1.1 MMOL/L (ref 0.4–2)
LYMPHOCYTES # BLD AUTO: 23 % (ref 12–49)
LYMPHOCYTES # BLD: 2.5 K/UL (ref 0.8–3.5)
MAGNESIUM SERPL-MCNC: 2.8 MG/DL (ref 1.6–2.4)
MCH RBC QN AUTO: 33 PG (ref 26–34)
MCHC RBC AUTO-ENTMCNC: 33.3 G/DL (ref 30–36.5)
MCV RBC AUTO: 98.9 FL (ref 80–99)
MONOCYTES # BLD: 0.9 K/UL (ref 0–1)
MONOCYTES NFR BLD AUTO: 8 % (ref 5–13)
NEUTS SEG # BLD: 7.6 K/UL (ref 1.8–8)
NEUTS SEG NFR BLD AUTO: 69 % (ref 32–75)
PCO2 BLDA: 34 MMHG (ref 35–45)
PCO2 BLDA: 35 MMHG (ref 35–45)
PH BLDA: 7.42 [PH] (ref 7.35–7.45)
PH BLDA: 7.45 [PH] (ref 7.35–7.45)
PHOSPHATE SERPL-MCNC: 1.4 MG/DL (ref 2.6–4.7)
PLATELET # BLD AUTO: 149 K/UL (ref 150–400)
PO2 BLDA: 62 MMHG (ref 80–100)
PO2 BLDA: 97 MMHG (ref 80–100)
POTASSIUM SERPL-SCNC: 3 MMOL/L (ref 3.5–5.1)
POTASSIUM SERPL-SCNC: 3.3 MMOL/L (ref 3.5–5.1)
PRESSURE SUPPORT SETTING VENT: 5 CM[H2O]
RBC # BLD AUTO: 3.73 M/UL (ref 4.1–5.7)
RBC MORPH BLD: ABNORMAL
SAO2 % BLD: 93 % (ref 92–97)
SAO2 % BLD: 98 % (ref 92–97)
SAO2% DEVICE SAO2% SENSOR NAME: ABNORMAL
SAO2% DEVICE SAO2% SENSOR NAME: ABNORMAL
SERVICE CMNT-IMP: ABNORMAL
SODIUM SERPL-SCNC: 137 MMOL/L (ref 136–145)
SPECIMEN SITE: ABNORMAL
SPECIMEN SITE: ABNORMAL
TROPONIN I SERPL-MCNC: 0.1 NG/ML
VENTILATION MODE VENT: ABNORMAL
VENTILATION MODE VENT: ABNORMAL
VT SETTING VENT: 475 ML
WBC # BLD AUTO: 11 K/UL (ref 4.1–11.1)
WBC MORPH BLD: ABNORMAL

## 2017-07-03 PROCEDURE — 85025 COMPLETE CBC W/AUTO DIFF WBC: CPT | Performed by: INTERNAL MEDICINE

## 2017-07-03 PROCEDURE — 84100 ASSAY OF PHOSPHORUS: CPT | Performed by: INTERNAL MEDICINE

## 2017-07-03 PROCEDURE — 83735 ASSAY OF MAGNESIUM: CPT | Performed by: INTERNAL MEDICINE

## 2017-07-03 PROCEDURE — 84484 ASSAY OF TROPONIN QUANT: CPT | Performed by: INTERNAL MEDICINE

## 2017-07-03 PROCEDURE — 74011000258 HC RX REV CODE- 258: Performed by: INTERNAL MEDICINE

## 2017-07-03 PROCEDURE — 74011000258 HC RX REV CODE- 258: Performed by: EMERGENCY MEDICINE

## 2017-07-03 PROCEDURE — 36415 COLL VENOUS BLD VENIPUNCTURE: CPT | Performed by: INTERNAL MEDICINE

## 2017-07-03 PROCEDURE — 65620000000 HC RM CCU GENERAL

## 2017-07-03 PROCEDURE — 74011250636 HC RX REV CODE- 250/636: Performed by: EMERGENCY MEDICINE

## 2017-07-03 PROCEDURE — 74011000250 HC RX REV CODE- 250: Performed by: INTERNAL MEDICINE

## 2017-07-03 PROCEDURE — 77030018786 HC NDL GD F/USND BARD -B

## 2017-07-03 PROCEDURE — 74011250637 HC RX REV CODE- 250/637: Performed by: INTERNAL MEDICINE

## 2017-07-03 PROCEDURE — 36600 WITHDRAWAL OF ARTERIAL BLOOD: CPT | Performed by: INTERNAL MEDICINE

## 2017-07-03 PROCEDURE — 82803 BLOOD GASES ANY COMBINATION: CPT | Performed by: INTERNAL MEDICINE

## 2017-07-03 PROCEDURE — 36569 INSJ PICC 5 YR+ W/O IMAGING: CPT | Performed by: INTERNAL MEDICINE

## 2017-07-03 PROCEDURE — 76937 US GUIDE VASCULAR ACCESS: CPT

## 2017-07-03 PROCEDURE — C1751 CATH, INF, PER/CENT/MIDLINE: HCPCS

## 2017-07-03 PROCEDURE — 74011250636 HC RX REV CODE- 250/636: Performed by: INTERNAL MEDICINE

## 2017-07-03 PROCEDURE — C9113 INJ PANTOPRAZOLE SODIUM, VIA: HCPCS | Performed by: INTERNAL MEDICINE

## 2017-07-03 PROCEDURE — 74000 XR ABD (KUB): CPT

## 2017-07-03 PROCEDURE — 80048 BASIC METABOLIC PNL TOTAL CA: CPT | Performed by: INTERNAL MEDICINE

## 2017-07-03 PROCEDURE — 84132 ASSAY OF SERUM POTASSIUM: CPT | Performed by: INTERNAL MEDICINE

## 2017-07-03 PROCEDURE — 83605 ASSAY OF LACTIC ACID: CPT | Performed by: INTERNAL MEDICINE

## 2017-07-03 PROCEDURE — 71010 XR CHEST PORT: CPT

## 2017-07-03 PROCEDURE — 94003 VENT MGMT INPAT SUBQ DAY: CPT

## 2017-07-03 PROCEDURE — 74011000250 HC RX REV CODE- 250

## 2017-07-03 PROCEDURE — 82550 ASSAY OF CK (CPK): CPT | Performed by: INTERNAL MEDICINE

## 2017-07-03 RX ORDER — POTASSIUM CHLORIDE 7.45 MG/ML
20 INJECTION INTRAVENOUS
Status: ACTIVE | OUTPATIENT
Start: 2017-07-03 | End: 2017-07-03

## 2017-07-03 RX ORDER — LORAZEPAM 2 MG/ML
0.5 INJECTION INTRAMUSCULAR
Status: DISCONTINUED | OUTPATIENT
Start: 2017-07-03 | End: 2017-07-08 | Stop reason: HOSPADM

## 2017-07-03 RX ORDER — SODIUM CHLORIDE 0.9 % (FLUSH) 0.9 %
10 SYRINGE (ML) INJECTION AS NEEDED
Status: DISCONTINUED | OUTPATIENT
Start: 2017-07-03 | End: 2017-07-08 | Stop reason: HOSPADM

## 2017-07-03 RX ORDER — BENZONATATE 100 MG/1
100 CAPSULE ORAL
Status: DISCONTINUED | OUTPATIENT
Start: 2017-07-03 | End: 2017-07-08 | Stop reason: HOSPADM

## 2017-07-03 RX ORDER — SODIUM CHLORIDE 0.9 % (FLUSH) 0.9 %
10-30 SYRINGE (ML) INJECTION AS NEEDED
Status: DISCONTINUED | OUTPATIENT
Start: 2017-07-03 | End: 2017-07-08 | Stop reason: HOSPADM

## 2017-07-03 RX ORDER — SODIUM CHLORIDE 0.9 % (FLUSH) 0.9 %
20 SYRINGE (ML) INJECTION AS NEEDED
Status: DISCONTINUED | OUTPATIENT
Start: 2017-07-03 | End: 2017-07-08 | Stop reason: HOSPADM

## 2017-07-03 RX ORDER — GUAIFENESIN/DEXTROMETHORPHAN 100-10MG/5
10 SYRUP ORAL
Status: DISCONTINUED | OUTPATIENT
Start: 2017-07-03 | End: 2017-07-08 | Stop reason: HOSPADM

## 2017-07-03 RX ORDER — MAGNESIUM SULFATE HEPTAHYDRATE 40 MG/ML
2 INJECTION, SOLUTION INTRAVENOUS ONCE
Status: DISCONTINUED | OUTPATIENT
Start: 2017-07-03 | End: 2017-07-03

## 2017-07-03 RX ORDER — ENOXAPARIN SODIUM 100 MG/ML
40 INJECTION SUBCUTANEOUS EVERY 24 HOURS
Status: DISCONTINUED | OUTPATIENT
Start: 2017-07-03 | End: 2017-07-08 | Stop reason: HOSPADM

## 2017-07-03 RX ORDER — HEPARIN 100 UNIT/ML
300 SYRINGE INTRAVENOUS AS NEEDED
Status: DISCONTINUED | OUTPATIENT
Start: 2017-07-03 | End: 2017-07-08 | Stop reason: HOSPADM

## 2017-07-03 RX ORDER — GUAIFENESIN/DEXTROMETHORPHAN 100-10MG/5
5 SYRUP ORAL
Status: DISCONTINUED | OUTPATIENT
Start: 2017-07-03 | End: 2017-07-03

## 2017-07-03 RX ORDER — POTASSIUM CHLORIDE 29.8 MG/ML
20 INJECTION INTRAVENOUS
Status: COMPLETED | OUTPATIENT
Start: 2017-07-03 | End: 2017-07-03

## 2017-07-03 RX ORDER — SODIUM CHLORIDE 0.9 % (FLUSH) 0.9 %
10-40 SYRINGE (ML) INJECTION EVERY 8 HOURS
Status: DISCONTINUED | OUTPATIENT
Start: 2017-07-03 | End: 2017-07-08 | Stop reason: HOSPADM

## 2017-07-03 RX ORDER — POTASSIUM CHLORIDE 29.8 MG/ML
20 INJECTION INTRAVENOUS
Status: COMPLETED | OUTPATIENT
Start: 2017-07-03 | End: 2017-07-04

## 2017-07-03 RX ORDER — SODIUM CHLORIDE 0.9 % (FLUSH) 0.9 %
10 SYRINGE (ML) INJECTION EVERY 24 HOURS
Status: DISCONTINUED | OUTPATIENT
Start: 2017-07-03 | End: 2017-07-08 | Stop reason: HOSPADM

## 2017-07-03 RX ORDER — POTASSIUM CHLORIDE 7.45 MG/ML
10 INJECTION INTRAVENOUS
Status: DISCONTINUED | OUTPATIENT
Start: 2017-07-03 | End: 2017-07-03

## 2017-07-03 RX ADMIN — PIPERACILLIN SODIUM,TAZOBACTAM SODIUM 3.38 G: 3; .375 INJECTION, POWDER, FOR SOLUTION INTRAVENOUS at 02:13

## 2017-07-03 RX ADMIN — Medication 10 ML: at 13:28

## 2017-07-03 RX ADMIN — ENOXAPARIN SODIUM 40 MG: 40 INJECTION SUBCUTANEOUS at 10:39

## 2017-07-03 RX ADMIN — MUPIROCIN: 20 OINTMENT TOPICAL at 08:20

## 2017-07-03 RX ADMIN — POTASSIUM CHLORIDE 10 MEQ: 10 INJECTION, SOLUTION INTRAVENOUS at 09:48

## 2017-07-03 RX ADMIN — PIPERACILLIN SODIUM,TAZOBACTAM SODIUM 3.38 G: 3; .375 INJECTION, POWDER, FOR SOLUTION INTRAVENOUS at 17:39

## 2017-07-03 RX ADMIN — Medication 10 ML: at 05:08

## 2017-07-03 RX ADMIN — LORAZEPAM 0.5 MG: 2 INJECTION INTRAMUSCULAR; INTRAVENOUS at 20:27

## 2017-07-03 RX ADMIN — GUAIFENESIN AND DEXTROMETHORPHAN 10 ML: 100; 10 SYRUP ORAL at 23:29

## 2017-07-03 RX ADMIN — DILTIAZEM HYDROCHLORIDE 5 MG/HR: 5 INJECTION, SOLUTION INTRAVENOUS at 06:55

## 2017-07-03 RX ADMIN — POTASSIUM CHLORIDE 20 MEQ: 400 INJECTION, SOLUTION INTRAVENOUS at 23:07

## 2017-07-03 RX ADMIN — POTASSIUM CHLORIDE 20 MEQ: 400 INJECTION, SOLUTION INTRAVENOUS at 14:34

## 2017-07-03 RX ADMIN — GUAIFENESIN AND DEXTROMETHORPHAN 5 ML: 100; 10 SYRUP ORAL at 14:36

## 2017-07-03 RX ADMIN — PROPOFOL 50 MCG/KG/MIN: 10 INJECTION, EMULSION INTRAVENOUS at 01:16

## 2017-07-03 RX ADMIN — PROPOFOL 50 MCG/KG/MIN: 10 INJECTION, EMULSION INTRAVENOUS at 04:42

## 2017-07-03 RX ADMIN — POTASSIUM CHLORIDE 10 MEQ: 10 INJECTION, SOLUTION INTRAVENOUS at 10:39

## 2017-07-03 RX ADMIN — POTASSIUM CHLORIDE 20 MEQ: 400 INJECTION, SOLUTION INTRAVENOUS at 13:36

## 2017-07-03 RX ADMIN — DEXTROSE MONOHYDRATE 15 MG/HR: 50 INJECTION, SOLUTION INTRAVENOUS at 22:06

## 2017-07-03 RX ADMIN — PROPOFOL 35 MCG/KG/MIN: 10 INJECTION, EMULSION INTRAVENOUS at 10:41

## 2017-07-03 RX ADMIN — CHLORHEXIDINE GLUCONATE 15 ML: 1.2 RINSE ORAL at 08:21

## 2017-07-03 RX ADMIN — Medication 10 ML: at 21:26

## 2017-07-03 RX ADMIN — SODIUM PHOSPHATE, MONOBASIC, MONOHYDRATE AND SODIUM PHOSPHATE, DIBASIC ANHYDROUS: 276; 142 INJECTION, SOLUTION INTRAVENOUS at 13:22

## 2017-07-03 RX ADMIN — SODIUM CHLORIDE 40 MG: 9 INJECTION, SOLUTION INTRAMUSCULAR; INTRAVENOUS; SUBCUTANEOUS at 09:48

## 2017-07-03 RX ADMIN — POTASSIUM CHLORIDE 20 MEQ: 400 INJECTION, SOLUTION INTRAVENOUS at 20:01

## 2017-07-03 RX ADMIN — DEXTROSE MONOHYDRATE 15 MG/HR: 50 INJECTION, SOLUTION INTRAVENOUS at 16:26

## 2017-07-03 RX ADMIN — Medication 30 ML: at 13:28

## 2017-07-03 RX ADMIN — POTASSIUM CHLORIDE 20 MEQ: 400 INJECTION, SOLUTION INTRAVENOUS at 15:38

## 2017-07-03 RX ADMIN — PIPERACILLIN SODIUM,TAZOBACTAM SODIUM 3.38 G: 3; .375 INJECTION, POWDER, FOR SOLUTION INTRAVENOUS at 09:36

## 2017-07-03 RX ADMIN — BENZONATATE 100 MG: 100 CAPSULE ORAL at 19:58

## 2017-07-03 RX ADMIN — MUPIROCIN: 20 OINTMENT TOPICAL at 17:40

## 2017-07-03 RX ADMIN — POTASSIUM CHLORIDE 20 MEQ: 400 INJECTION, SOLUTION INTRAVENOUS at 21:26

## 2017-07-03 NOTE — PROGRESS NOTES
@9732   PICC Education: Explained reason and rationale for PICC placement along with providing education in order to make an informed consent including nature, risks, benefits, potential complications, care and maintenance of PICC line. The opportunity for questions or concerns was given. A 'Patient PICC Handbook' and PICC nurse's zone phone number provided for future questions and concerns. Patient's spouse, Christina Benítez, gave written consent for PICC procedure to be done at the bedside. Patient's spouse Christina Benítez verbalizes understanding and denies questions at this time. Patient intubated at this time and unable to sign consent. Edi Warner RN / Vascular Access Team       @8064  Inserted triple lumen 6 fr PICC in   Right basilic vein at 60YR with external length 0 cm out (at the hub),   using using modified seldinger technique, Sherlock TLS and 3CG TPS.  (6 fr occupies 9% of the  basilic vein according to U/S measurement)   Pt's rhythm paced / . Sherlock Tip Placement device, Ultrasound guidance, Lidocaine 1% used (yes)   and amount of lidocaine used (5ml), maximum sterile barrier precautions observed. Reason for access (irritant/vesicant). Complications related to insertion (none). Patient tolerated procedure well with minimal blood loss. Sterile dressing applied with Biopatch, Stat loc and Tegaderm. PICC Booklet/Handout provided. Right  arm circumference:32 cm. Timeout verified the correct patient and correct procedure. Brand of catheter BARD SOLO POWER PICC, Lot #[id number]/ REF# [id number] / EXP 2018-04-30. Portable CXR ordered. Primary nurse Barbara Manzo RN aware to NOT USE UNTIL PICC TIP PLACEMENT IS CONFIRMED BY CXR RESULTS and to hang new infusion tubing prior to connecting to PICC line.   Assisting PICC nurse:Yana Dent RN / Vascular Access Team  Edi Warner, [facility] / Vascular Access Team    @2547  FINDINGS: Single AP portable view of the chest obtained at 12:14 PM demonstrates  a new right arm PICC line, which has its tip at the level of the cavoatrial  junction. There is otherwise no change in position of the lines and tubes. The  cardiomediastinal silhouette is stable. There is persistent bibasilar  atelectasis. No pneumothorax is seen. There is no evidence of pleural effusion. IMPRESSION: Right arm PICC line appears to be in satisfactory position. [facility], [address] [place] [address] 7/03/2017 12:51     Primary nurse Danisha Rai RN notified okay to use PICC line as portable CXR results are as stated above.   Yue Negrete RN/Vascular Access Team

## 2017-07-03 NOTE — PROGRESS NOTES
0700-Bedside and Verbal shift change report given to Griselda Merle, JOSE EDUARDO (oncoming nurse) by CECELIA Valle, RN (offgoing nurse). Report included the following information SBAR, Kardex, ED Summary, Procedure Summary, Intake/Output, MAR, Recent Results and Cardiac Rhythm A. Fib.     0730-Cardiology consulted for Afbib RVR and Hx of pacemaker placement. 0830-Patient expelled the rectal decompression tube when having a BM today. Dr. Mary Camarena paged. 0910-Shan Dalton at bedside updating patient's wife. 1015-Dr. Maggie Glass at bedside updating patient's wife. 1035-Emilie Leonard at bedside. Aware that patient no longer has rectal decompression tube and is happy with how soft his abdomen is today. KUB ordered. 1000-PICC ordered by Dr. Fritzi Aase to allow K+ to be given more quickly (20mEq instead of 10mEq. 1045-Propofol beeping distal occlusion. IV noted to be kinked, but patient now awake, alert, calm and following commands. Proceed with SAT/SBT as tolerated per Dr. Fritzi Aase.    1100-Dr. Efrain Esparza at bedside. No changes made. Patient now undergoing SBT and is awake and following commands. 1200-PICC line placed. 1215-CXR, KUB, and ABG performed. Patient remains on SBT until ABG comes back. 1240-Patient extubated to 4L NC. Patient alert and following commands with very weak voice. 1330-PICC placement confirmed. IV K+ changed form 10 to 20mEq.    1345-Dr. Mary Camarena paged to inform about extubation and inform that the OGT came out as well. 1350-Dr. Galarza at bedside. 1400-Dr. Mary Camarena aware that OGT came out with ETT. Received and NPO with meds and ice chips ordered. 1440-Patient cleared for NPO with ice chips and meds. Received Mucinex for his cough. Patient had a cold before he came to the hospital and has basilar atelectasis on his CXR. Will monitor for aspiration.     1800-Dr. Zuniga Book called me on the phone and verbalized that he was going to see what the patient's next K+ was and then maybe order Amio in addition to the Cardizem. Amio for the rhythm and Cardizem for the rate, titrate PRN. 1810-Patient continues to have a productive cough which appear to be contributing to his tachycardia. When he coughs his HR goes as high as 170. His sats remain 98% on 4L and rate 22bpm.  Patient alert and oriented and verbalized that he feels fine other than his nagging cough. Tessalon pearls ordered by Dr. Emma Sutton.    1850-Dr. Emma Sutton aware that IV K+ 3.3.  60mEq ordered.

## 2017-07-03 NOTE — CONSULTS
CARDIOLOGY CONSULT    Patient ID:  Patient: Dashawn Levine  MRN: [id number]  Age: 78 y.o.  : 1940    Date of  Admission: 2017  9:45 PM   PCP:  Polly Foley MD   Usual cardiologist:  Ana Steel MD    Assessment: 1. Paroxysmal atrial fibrillation. RVR requiring rate control with diltiazem. Not on OP anticoagulation. 2. History of paroxysmal SVT converted with adenosine in 2016. 3. Chronic left bundle branch block. 4. 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 (Placed by Dr. Yennifer Hernandez with our practice). 5. Severe colonic distension with sigmoid volvulus, improving. S/p decompression. 6. Acute respiratory failure. Intubated. 7. Hypokalemia. 8. Chronic ASA therapy. Plan:     1. Rate control for now. Diltiazem infusion. 2. [place] 8057 for spontaneous conversion today. May need to nudge him to sinus, will see. May start amiodarone later today if K reasonably replete. 4. Not an immediate anticoagulation candidate. 5. He follows in our [facility] office, so I'll check the pacemaker data from the past to see what the burden of Afib has been before (though that won't change immediate plan). He definitely had an adenosine-sensitive SVT in 2016. Discussed with nursing. All questions answered for the family (wife, son). [x]       High complexity decision making was performed in this patient at high risk for decompensation with multiple organ involvement. Dashawn Levine is a 78 y.o. male with a history of sick sinus syndrome, SVT, and now paroxysmal atrial fibrillation with RVR with onset while in the ICU. He was in sinus on admission. He was placed on diltiazem infusion. In the past, he's been on metoprolol and diltiazem, now just oral diltiazem as an OP per the wife's list provided. I suspect he's had atrial fibrillation in the past (told he had an irregular heart beat before), though not on chronic anticoagulation.     When he was seen in the ER:  Salina Halsted is a 78 y.o. male with hx of CKD and pacemaker who presents ambulatory to the ED c/o constant abdominal pain and distention x 2 days. They also note constipation x weeks noting only liquid stool. Family also notes fatigue, confusion, and loss of appetite. They state pt has been thrashing in bed all day and unable to sleep. Family report pt has been talking less than baseline and unable to communicate. They state pt was unable to dress self and ambulate and required assistance with both today. Pt is normally alert and oriented, ambulatory, and can take care of his own ADLs. Per family, pt has not eaten x 2 days. Family state pt was evaluated in ED yesterday for abdominal pain and distension. Labs were within normal limits and UA was negative for infection. The CT abdomen indicated \"severe colon distention with fecal stasis. \" Pt was administered morphine and KCL. He was prescribed norco and magnesium citrate. Family states he took the magnesium citrate and attempted an enema, but has not had a bowel movement yet. Family states patient has not had a good bowel movement in \"weeks\", and that he has been seen by GI for watery stools several months ago. Pt denies sore throat, cough, CP, SOB, fever, chills, nausea and vomiting. \"    He cannot communicate a history due to being intubated and on the ventilator. He was admitted with symptomatic bowel issue and sepsis. Past Medical History:   Diagnosis Date    Chronic kidney disease     Pacemaker 2013        Past Surgical History:   Procedure Laterality Date    HX ORTHOPAEDIC      hip replacement, knee, back       Social History   Substance Use Topics    Smoking status: Never Smoker    Smokeless tobacco: Never Used    Alcohol use Yes      Comment: occationally        History reviewed. No pertinent family history.      Allergies   Allergen Reactions    Augmentin [Amoxicillin-Pot Clavulanate] Rash    Ciprofloxacin Rash Current Facility-Administered Medications   Medication Dose Route Frequency    enoxaparin (LOVENOX) injection 40 mg  40 mg SubCUTAneous Q24H    potassium chloride 10 mEq in 100 ml IVPB  20 mEq IntraVENous Q1H PRN    pantoprazole (PROTONIX) 40 mg in sodium chloride 0.9 % 10 mL injection  40 mg IntraVENous DAILY    sodium chloride (NS) flush 10-30 mL  10-30 mL InterCATHeter PRN    sodium chloride (NS) flush 10 mL  10 mL InterCATHeter Q24H    sodium chloride (NS) flush 10 mL  10 mL InterCATHeter PRN    sodium chloride (NS) flush 10-40 mL  10-40 mL InterCATHeter Q8H    sodium chloride (NS) flush 20 mL  20 mL InterCATHeter PRN    heparin (porcine) pf 300 Units  300 Units InterCATHeter PRN    dilTIAZem (CARDIZEM) 100 mg in dextrose 5% (MBP/ADV) 100 mL infusion  0-15 mg/hr IntraVENous TITRATE    LORazepam (ATIVAN) injection 0.5 mg  0.5 mg IntraVENous Q4H PRN    benzonatate (TESSALON) capsule 100 mg  100 mg Oral Q8H PRN    guaiFENesin-dextromethorphan (ROBITUSSIN DM) 100-10 mg/5 mL syrup 10 mL  10 mL Oral Q6H PRN    potassium chloride 20 mEq in 50 ml IVPB  20 mEq IntraVENous Q2H    amiodarone (CORDARONE) 150 mg in dextrose 5% 100 mL bolus infusion  150 mg IntraVENous ONCE    amiodarone (CORDARONE) 450 mg in dextrose 5% 250 mL infusion  0.5-1 mg/min IntraVENous CONTINUOUS    mupirocin (BACTROBAN) 2 % ointment   Both Nostrils BID    piperacillin-tazobactam (ZOSYN) 3.375 g in 0.9% sodium chloride (MBP/ADV) 100 mL  3.375 g IntraVENous Q8H    sodium chloride (NS) flush 5-10 mL  5-10 mL IntraVENous PRN    acetaminophen (TYLENOL) tablet 650 mg  650 mg Oral Q4H PRN    ondansetron (ZOFRAN) injection 4 mg  4 mg IntraVENous Q4H PRN    hydrALAZINE (APRESOLINE) 20 mg/mL injection 20 mg  20 mg IntraVENous Q6H PRN    zinc oxide-cod liver oil (DESITIN) 40 % paste   Topical PRN       Review of Symptoms:  Patient cannot communicate.   General: negative for fever, chills, sweats, weakness, weight loss   Eyes: negative for blurred vision, eye pain, loss of vision, diplopia   Ear Nose and Throat: negative for rhinorrhea, pharyngitis, otalgia, tinnitus, speech or swallowing difficulties   Respiratory: negative for cough, sputum production, wheezing, ANDERSON, pleuritic pain   Cardiology: negative for chest pain, palpitations, orthopnea, PND, edema, syncope   Gastrointestinal: negative for abdominal pain, N/V, dysphagia   Genitourinary: negative for frequency, urgency, dysuria, hematuria   Muskuloskeletal : negative for arthralgia, myalgia   Hematology: negative for easy bruising, bleeding, lymphadenopathy   Dermatological: negative for rash, ulceration, mole change, new lesion   Endocrine: negative for hot flashes or polydipsia   Neurological: negative for headache, dizziness, confusion, focal weakness, paresthesia, memory loss, gait disturbance   Psychological: negative for anxiety, depression, agitation       Objective:      Physical Exam:  Temp (24hrs), Av.3 °F (36.8 °C), Min:97.7 °F (36.5 °C), Max:98.8 °F (37.1 °C)    Patient Vitals for the past 8 hrs:   Pulse   17 2200 98   17 2100 93   17 (!) 128   17 1802 (!) 125   17 1730 90   17 1630 97   17 1600 90   17 1530 92   17 1500 85    Patient Vitals for the past 8 hrs:   Resp   17 2200 22   17 2100 22   17 2000 27   17 1802 24   17 1730 26   17 1630 22   17 1600 25   17 1530 23   17 1500 21    Patient Vitals for the past 8 hrs:   BP   17 2200 142/59   17 2100 146/52   17 138/54   17 1802 134/58   17 1730 131/54   17 1630 148/56   17 1600 132/52   17 1530 145/64   17 1500 146/54        Intake/Output Summary (Last 24 hours) at 17 4692  Last data filed at 17 2126   Gross per 24 hour   Intake           1022.2 ml   Output             1700 ml   Net           -677.8 ml       Nondiaphoretic, not in acute distress, intubated and mechanically-ventilated. Supple, no palpable thyromegaly. No scleral icterus, mucous membranes moist, conjuctivae pink, no xanthelasma. Unlabored, clear to auscultation bilaterally anteriorly, symmetric air movement. Regular rate and rhythm, no murmur, pericardial rub, knock, or gallop. No JVD or peripheral edema. No carotid bruit. Palpable radial and DP pulses bilaterally. Abdomen, soft, nontender, nondistended. Extremities without cyanosis or clubbing. Muscle tone and bulk normal.  Skin warm and dry. No rashes or ulcers. Neuro grossly nonfocal.  No tremor. Awakens, but not fully responsive. CARDIOGRAPHICS and STUDIES, I reviewed:    Telemetry:  Afib with RVR. ECG 6/30:  SR with LBBB on admission. CXR 7/3:  No CHF.          Labs:  Recent Labs      07/03/17   0352   CPK  225   CKMB  1.8   CKNDX  0.8   TROIQ  0.10*     No results found for: CHOL, CHOLX, CHLST, CHOLV, HDL, LDL, LDLC, DLDLP, Suanne Oas, CHHD, CHHDX  Recent Labs      07/02/17   0042   INR  1.3*   PTP  12.7*   APTT  30.1      Recent Labs      07/03/17   1745  07/03/17   0352  07/02/17   1708  07/02/17   0438  07/01/17 2202   NA   --   137  140  138  135*   K  3.3*  3.0*  3.3*  2.7*  2.8*   CL   --   104  108  107  101   CO2   --   22  23  23  23   BUN   --   16  19  15  18   CREA   --   1.01  0.93  0.75  1.11   GLU   --   72  91  142*  180*   PHOS   --   1.4*   --    --    --    CA   --   7.3*  7.4*  6.8*  8.3*   ALB   --    --    --    --   3.7   WBC   --   11.0   --   13.2*  16.0*   HGB   --   12.3   --   12.0*  14.4   HCT   --   36.9   --   35.4*  40.8   PLT   --   149*   --   178  232     Recent Labs      07/01/17   2202   SGOT  35   AP  88   TP  7.3   ALB  3.7   GLOB  3.6   LPSE  81     No components found for: [place]  Recent Labs      07/03/17   1228  07/03/17   0417   PH  7.45  7.42   PCO2  34*  35   PO2  62*  97           Gamal Cifuentes MD  7/3/2017

## 2017-07-03 NOTE — PROGRESS NOTES
[facility]  Pulmonary, Critical Care, and Sleep Medicine    Name: Percy Ham MRN: [id number]   : 1940 Hospital: [facility]   Date: 7/3/2017        Critical Care    IMPRESSION:   · Respiratory failure- failed SBT  · PAF RVR  · Sigmoid volvulus s/p emergent endoscopic decompression  · SIRS  · Leucocytosis  · Lactic acidosis  · CKD  · Chronic steroids   · Low K      RECOMMENDATIONS:   · Ventilator support, wean as tolerated  · GI/Surgery following   · Empiric antibiotics   · Replete lytes  · Monitor renal function  · Hold on TPN for a day or so  · DVT and GI prophylaxis      Subjective/History:     7/3 Pt on vent. Sedated. Spoke with wife at bedside to discuss management. She had many questions regarding his presentation, AMS and subsequent intubation. I explained the pathophysiology of a volvulus and risk of sepsis from translocation of gut bacteria. He has a lot of constipation prior to this admission. Cannot guarantee this will not happen again but with supportive care, we hope he will recover but needs time. Good to know he was highly functional prior to this weekend    This patient has been seen and evaluated at the request of Dr. Maik Ortega for respiratory failure and sigmoid volvulus s/p emergent decompression. Patient is a 78 y.o. male admitted with abdominal distension and sigmoid volvulus. S/P emergent decompression. The patient is critically ill and can not provide additional history due to intubated     Past Medical History:   Diagnosis Date    Chronic kidney disease     Pacemaker       Past Surgical History:   Procedure Laterality Date    HX ORTHOPAEDIC      hip replacement, knee, back      Prior to Admission medications    Medication Sig Start Date End Date Taking? Authorizing Provider   aspirin 81 mg chewable tablet Take 81 mg by mouth daily.    Yes Historical Provider   ciprofloxacin HCl (CIPRO) 500 mg tablet Take 500 mg by mouth two (2) times a day.   Yes Oseas Chandler MD   dilTIAZem XR (DILACOR XR) 120 mg XR capsule Take 120 mg by mouth daily. Yes Oseas Chandler MD   pregabalin (LYRICA) 100 mg capsule Take  by mouth two (2) times a day. Yes Oseas Chandler MD   HYDROcodone-acetaminophen (NORCO) 5-325 mg per tablet Take 1 Tab by mouth every six (6) hours as needed for Pain. Max Daily Amount: 4 Tabs. 6/30/17  Yes Prosper Rizzo MD   omeprazole (PRILOSEC) 20 mg capsule Take 20 mg by mouth daily. Yes Oseas Chandler MD   LORazepam (ATIVAN) 1 mg tablet Take  by mouth every four (4) hours as needed. Yes Oseas Chandler MD   tamsulosin (FLOMAX) 0.4 mg capsule Take 0.4 mg by mouth daily. Yes Oseas Chandler MD   finasteride (PROSCAR) 5 mg tablet Take 5 mg by mouth daily. Yes Oseas Chandler MD   predniSONE (DELTASONE) 20 mg tablet Take 30 mg by mouth daily (with breakfast). Oseas Chandler MD   metoprolol succinate (TOPROL-XL) 50 mg XL tablet Take 1 Tab by mouth daily. Patient taking differently: Take 25 mg by mouth daily. 11/5/16   Dunia Pizarro DO   amLODIPine (NORVASC) 5 mg tablet Take 5 mg by mouth daily. Oseas Chandler MD     Current Facility-Administered Medications   Medication Dose Route Frequency    dilTIAZem (CARDIZEM) 125 mg in dextrose 5% 125 mL infusion  0-15 mg/hr IntraVENous TITRATE    propofol (DIPRIVAN) infusion  5-50 mcg/kg/min IntraVENous TITRATE    chlorhexidine (PERIDEX) 0.12 % mouthwash 15 mL  15 mL Oral BID    mupirocin (BACTROBAN) 2 % ointment   Both Nostrils BID    piperacillin-tazobactam (ZOSYN) 3.375 g in 0.9% sodium chloride (MBP/ADV) 100 mL  3.375 g IntraVENous Q8H    sodium chloride (NS) flush 5-10 mL  5-10 mL IntraVENous Q8H     Allergies   Allergen Reactions    Augmentin [Amoxicillin-Pot Clavulanate] Rash    Ciprofloxacin Rash      Social History   Substance Use Topics    Smoking status: Never Smoker    Smokeless tobacco: Never Used    Alcohol use Yes      Comment: occationally      History reviewed.  No pertinent family history. Review of Systems:  Intubated     Objective:   Vital Signs:    Visit Vitals    /54    Pulse (!) 128    Temp 97.7 °F (36.5 °C)    Resp 20    Ht 6' (1.829 m)    Wt 88.7 kg (195 lb 8.8 oz)    SpO2 98%    BMI 26.52 kg/m2       O2 Device: Ventilator   O2 Flow Rate (L/min): 2 l/min   Temp (24hrs), Av.7 °F (37.1 °C), Min:97.7 °F (36.5 °C), Max:99.2 °F (37.3 °C)       Intake/Output:   Last shift:         Last 3 shifts:  1901 -  0700  In: 1887.9 [I.V.:1737.9]  Out: 1449 [Urine:3190]    Intake/Output Summary (Last 24 hours) at 17 0724  Last data filed at 17 0700   Gross per 24 hour   Intake          1823.65 ml   Output             2320 ml   Net          -496.35 ml         Physical Exam:    General:  Intubated and sedated    Head:  Normocephalic, without obvious abnormality, atraumatic. Eyes:  Conjunctivae/corneas clear. PERRL, EOMs intact. Nose: Nares normal. Septum midline. Mucosa normal. No drainage or sinus tenderness. Throat: Intubated   Neck: Supple, symmetrical, trachea midline, no adenopathy, thyroid: no enlargment/tenderness/nodules, no carotid bruit and no JVD. Back:   Symmetric, no curvature. ROM normal.   Lungs:   Clear to auscultation bilaterally. Chest wall:  No tenderness or deformity. Heart:  Regular rate and rhythm, S1, S2 normal, no murmur, click, rub or gallop. Abdomen:   Some distension   Extremities: Extremities normal, atraumatic, no cyanosis or edema. Pulses: 2+ and symmetric all extremities.    Skin: Skin color, texture, turgor normal. No rashes or lesions   Lymph nodes: Cervical, supraclavicular, and axillary nodes normal.   Neurologic: Intubated and sedated        Data:     Recent Results (from the past 24 hour(s))   METABOLIC PANEL, BASIC    Collection Time: 17  5:08 PM   Result Value Ref Range    Sodium 140 136 - 145 mmol/L    Potassium 3.3 (L) 3.5 - 5.1 mmol/L    Chloride 108 97 - 108 mmol/L    CO2 23 21 - 32 mmol/L    Anion gap 9 5 - 15 mmol/L    Glucose 91 65 - 100 mg/dL    BUN 19 6 - 20 MG/DL    Creatinine 0.93 0.70 - 1.30 MG/DL    BUN/Creatinine ratio 20 12 - 20      GFR est AA >60 >60 ml/min/1.73m2    GFR est non-AA >60 >60 ml/min/1.73m2    Calcium 7.4 (L) 8.5 - 10.1 MG/DL   CULTURE, RESPIRATORY/SPUTUM/BRONCH W GRAM STAIN    Collection Time: 07/02/17  5:14 PM   Result Value Ref Range    Special Requests: NO SPECIAL REQUESTS      GRAM STAIN OCCASIONAL  WBCS SEEN        GRAM STAIN RARE  EPITHELIAL CELLS SEEN        GRAM STAIN NO ORGANISMS SEEN      Culture result: PENDING    CBC WITH AUTOMATED DIFF    Collection Time: 07/03/17  3:52 AM   Result Value Ref Range    WBC 11.0 4.1 - 11.1 K/uL    RBC 3.73 (L) 4.10 - 5.70 M/uL    HGB 12.3 12.1 - 17.0 g/dL    HCT 36.9 36.6 - 50.3 %    MCV 98.9 80.0 - 99.0 FL    MCH 33.0 26.0 - 34.0 PG    MCHC 33.3 30.0 - 36.5 g/dL    RDW 13.6 11.5 - 14.5 %    PLATELET 726 (L) 460 - 400 K/uL    NEUTROPHILS 69 32 - 75 %    LYMPHOCYTES 23 12 - 49 %    MONOCYTES 8 5 - 13 %    EOSINOPHILS 0 0 - 7 %    BASOPHILS 0 0 - 1 %    ABS. NEUTROPHILS 7.6 1.8 - 8.0 K/UL    ABS. LYMPHOCYTES 2.5 0.8 - 3.5 K/UL    ABS. MONOCYTES 0.9 0.0 - 1.0 K/UL    ABS. EOSINOPHILS 0.0 0.0 - 0.4 K/UL    ABS.  BASOPHILS 0.0 0.0 - 0.1 K/UL    RBC COMMENTS NORMOCYTIC, NORMOCHROMIC      WBC COMMENTS REACTIVE LYMPHS      DF SMEAR SCANNED     METABOLIC PANEL, BASIC    Collection Time: 07/03/17  3:52 AM   Result Value Ref Range    Sodium 137 136 - 145 mmol/L    Potassium 3.0 (L) 3.5 - 5.1 mmol/L    Chloride 104 97 - 108 mmol/L    CO2 22 21 - 32 mmol/L    Anion gap 11 5 - 15 mmol/L    Glucose 72 65 - 100 mg/dL    BUN 16 6 - 20 MG/DL    Creatinine 1.01 0.70 - 1.30 MG/DL    BUN/Creatinine ratio 16 12 - 20      GFR est AA >60 >60 ml/min/1.73m2    GFR est non-AA >60 >60 ml/min/1.73m2    Calcium 7.3 (L) 8.5 - 10.1 MG/DL   MAGNESIUM    Collection Time: 07/03/17  3:52 AM   Result Value Ref Range    Magnesium 2.8 (H) 1.6 - 2.4 mg/dL   PHOSPHORUS Collection Time: 07/03/17  3:52 AM   Result Value Ref Range    Phosphorus 1.4 (L) 2.6 - 4.7 MG/DL   LACTIC ACID    Collection Time: 07/03/17  3:52 AM   Result Value Ref Range    Lactic acid 1.1 0.4 - 2.0 MMOL/L   CK W/ CKMB & INDEX    Collection Time: 07/03/17  3:52 AM   Result Value Ref Range     39 - 308 U/L    CK - MB 1.8 <3.6 NG/ML    CK-MB Index 0.8 0 - 2.5     TROPONIN I    Collection Time: 07/03/17  3:52 AM   Result Value Ref Range    Troponin-I, Qt. 0.10 (H) <0.05 ng/mL   BLOOD GAS, ARTERIAL    Collection Time: 07/03/17  4:17 AM   Result Value Ref Range    pH 7.42 7.35 - 7.45      PCO2 35 35.0 - 45.0 mmHg    PO2 97 80 - 100 mmHg    O2 SAT 98 (H) 92 - 97 %    BICARBONATE 22 22 - 26 mmol/L    BASE DEFICIT 1.4 mmol/L    O2 METHOD VENTILATOR      FIO2 40 %    MODE A/C      Tidal volume 475      SET RATE 14      EPAP/CPAP/PEEP 6.0      Sample source ARTERIAL      SITE RIGHT RADIAL      GLEN'S TEST YES                   Imaging:  I have personally reviewed the patients radiographs and have reviewed the reports:  CXR bibasilar atelectasis        Total critical care time exclusive of procedures: 35 minutes  Yvette Avila MD

## 2017-07-03 NOTE — PROGRESS NOTES
Hospitalist Progress Note    NAME: Luis Ogden   :  1940   MRN:  [id number]       Assessment / Plan:  Sigmoid volvulus with proximal colonic obstruction, severe colon distention  -GI planning to perform emergent endoscopic decompression now, high risk for perforation, general surgery is aware of patient  -no signs of perforation or ischemia on CT  -will continue empiric antibiotics  -repeat KUB  - GI/gen surg following    Afib RVR  -cont' dilt gtt  -replete K  -cardiology following    Hypokalemia  -aggressive repletion with KCl  -monitor bmp, mag    Severe sepsis  Acute respiratory failure requiring emergent intubation, now extubated  UTI  -leukocytosis, tachycardic, lactate 2.4, consider GI source CT with above findings but negative for colitis or ischemia  -patient had sudden hypoxia and apnea, unclear etiology - consider aspiration, flash pulmonary edema, CXR with bilateral interstitial markings  -cont mechanical ventilation  -continue empiric zosyn  -blood cultures ntd, urine culture showed Ecoli  -abx as above     Hypertension  -holding home meds for now   -hydralazine PRN     Unspecified kidney disease  -family unclear of exact diagnosis but know that patient has been taking prednisone for the last year and is currently being tapered to 10 mg daily     Body mass index is 26.52 kg/(m^2). Code status: Full  Prophylaxis: Lovenox  Recommended Disposition: Home w/Family     Subjective:     Chief Complaint / Reason for Physician Visit  Pt seen at bedside, s/p extubation. Discussed with RN events overnight.      Review of Systems:  Symptom Y/N Comments  Symptom Y/N Comments   Fever/Chills n   Chest Pain n    Poor Appetite    Edema     Cough y   Abdominal Pain n    Sputum    Joint Pain     SOB/ANDERSON n   Pruritis/Rash     Nausea/vomit    Tolerating PT/OT     Diarrhea    Tolerating Diet     Constipation    Other       Could NOT obtain due to:      Objective:     VITALS:   Last 24hrs VS reviewed since prior progress note. Most recent are:  Patient Vitals for the past 24 hrs:   Temp Pulse Resp BP SpO2   07/03/17 1115 - (!) 105 22 138/67 96 %   07/03/17 1100 - 94 22 120/52 96 %   07/03/17 1057 - (!) 106 20 - 96 %   07/03/17 1045 - 95 18 121/60 97 %   07/03/17 1030 - 96 19 119/62 97 %   07/03/17 1000 - (!) 101 21 97/58 98 %   07/03/17 0945 - (!) 101 21 109/54 97 %   07/03/17 0930 - (!) 106 18 106/71 97 %   07/03/17 0900 - (!) 106 18 115/50 97 %   07/03/17 0830 - (!) 115 18 106/52 98 %   07/03/17 0815 - (!) 122 20 114/57 97 %   07/03/17 0801 - - - - 99 %   07/03/17 0800 98.6 °F (37 °C) (!) 124 19 123/56 99 %   07/03/17 0758 - (!) 112 20 - 99 %   07/03/17 0730 - (!) 135 19 120/57 98 %   07/03/17 0700 - (!) 144 20 127/57 98 %   07/03/17 0640 - (!) 128 20 130/54 98 %   07/03/17 0625 - (!) 134 23 130/46 98 %   07/03/17 0622 - (!) 130 22 131/52 98 %   07/03/17 0615 - (!) 126 21 130/48 98 %   07/03/17 0612 - (!) 121 23 126/50 98 %   07/03/17 0601 - (!) 125 23 127/64 98 %   07/03/17 0500 - 72 15 117/59 100 %   07/03/17 0400 97.7 °F (36.5 °C) 69 15 127/48 100 %   07/03/17 0348 - 71 17 - 100 %   07/03/17 0300 - 72 22 105/46 99 %   07/03/17 0222 - 74 15 121/52 100 %   07/03/17 0100 - 75 14 111/64 100 %   07/03/17 0000 98.8 °F (37.1 °C) 76 19 134/54 99 %   07/02/17 2342 - 79 15 - 99 %   07/02/17 2300 - 77 18 131/58 99 %   07/02/17 2200 - 76 15 124/59 99 %   07/02/17 2100 - 79 15 121/52 99 %   07/02/17 2000 - 77 16 122/53 100 %   07/02/17 1940 - 81 14 - 99 %   07/02/17 1900 99.2 °F (37.3 °C) 81 15 110/54 99 %   07/02/17 1800 - 78 16 109/53 97 %   07/02/17 1700 - 79 15 104/49 98 %   07/02/17 1600 99.1 °F (37.3 °C) 83 21 116/86 96 %   07/02/17 1503 - 79 14 - 99 %   07/02/17 1500 - 78 14 101/49 99 %       Intake/Output Summary (Last 24 hours) at 07/03/17 1331  Last data filed at 07/03/17 0900   Gross per 24 hour   Intake          1192.84 ml   Output             1970 ml   Net          -777.16 ml        PHYSICAL EXAM:  General: WD, WN. Alert, cooperative, no acute distress    EENT:  Dry oral mucosa, no adenopathy  Resp:  CTA bilaterally, no wheezing or rales. No accessory muscle use  CV:  irregular  rhythm,  No edema  GI:  Soft, Non distended, decr BS  Neurologic:  Awake, alert, responsive  Psych:   Unable to do  Skin:  No rashes. No jaundice    Reviewed most current lab test results and cultures  YES  Reviewed most current radiology test results   YES  Review and summation of old records today    NO  Reviewed patient's current orders and MAR    YES  PMH/SH reviewed - no change compared to H&P  ________________________________________________________________________  Care Plan discussed with:    Comments   Patient x    Family      RN x    Care Manager     Consultant                        Multidiciplinary team rounds were held today with , nursing, pharmacist and clinical coordinator. Patient's plan of care was discussed; medications were reviewed and discharge planning was addressed. ________________________________________________________________________  Total NON critical care TIME:  35   Minutes    Total CRITICAL CARE TIME Spent:   Minutes non procedure based      Comments   >50% of visit spent in counseling and coordination of care     ________________________________________________________________________  María Spence MD     Procedures: see electronic medical records for all procedures/Xrays and details which were not copied into this note but were reviewed prior to creation of Plan. LABS:  I reviewed today's most current labs and imaging studies.   Pertinent labs include:  Recent Labs      07/03/17   0352  07/02/17   0438  07/01/17   2202   WBC  11.0  13.2*  16.0*   HGB  12.3  12.0*  14.4   HCT  36.9  35.4*  40.8   PLT  149*  178  232     Recent Labs      07/03/17   0352  07/02/17   1708  07/02/17   0438  07/02/17   0042  07/01/17   2202  06/30/17   2106   NA  137  140  138   --   135*  132*   K  3.0*  3.3*  2.7* --   2.8*  3.1*   CL  104  108  107   --   101  97   CO2  22  23  23   --   23  24   GLU  72  91  142*   --   180*  120*   BUN  16  19  15   --   18  10   CREA  1.01  0.93  0.75   --   1.11  0.97   CA  7.3*  7.4*  6.8*   --   8.3*  8.3*   MG  2.8*   --    --    --   3.2*   --    PHOS  1.4*   --    --    --    --    --    ALB   --    --    --    --   3.7  3.7   TBILI   --    --    --    --   0.7  0.6   SGOT   --    --    --    --   35  21   ALT   --    --    --    --   21  19   INR   --    --    --   1.3*   --    --        Signed: Tahir Seay MD

## 2017-07-03 NOTE — INTERDISCIPLINARY ROUNDS
Interdisciplinary team rounds were held 7/3/2017 with the following team members:Care Management, Diabetes Treatment Specialist, Nursing, Nutrition, Pharmacy, Physician, and Respiratory Therapy. Plan of care discussed, picc order. See clinical pathway and/or care plan for interventions and desired outcomes.

## 2017-07-03 NOTE — PROGRESS NOTES
Gastroenterology Progress Note    7/3/2017    Admit Date: 7/1/2017    Subjective: Follow up for: colonic distension and sigmoid volvulus. Still intubated. Rectal tube came out. Patient was seen in rounds by me today. Wife and son and bedside. Current Facility-Administered Medications   Medication Dose Route Frequency    dilTIAZem (CARDIZEM) 125 mg in dextrose 5% 125 mL infusion  0-15 mg/hr IntraVENous TITRATE    enoxaparin (LOVENOX) injection 40 mg  40 mg SubCUTAneous Q24H    potassium chloride 10 mEq in 100 ml IVPB  10 mEq IntraVENous Q1H    potassium chloride 10 mEq in 100 ml IVPB  20 mEq IntraVENous Q1H PRN    sodium phosphate 30 mmol in 0.9% sodium chloride 500 mL infusion   IntraVENous ONCE    pantoprazole (PROTONIX) 40 mg in sodium chloride 0.9 % 10 mL injection  40 mg IntraVENous DAILY    magnesium sulfate injection 2 g  2 g IntraVENous ONCE    propofol (DIPRIVAN) infusion  5-50 mcg/kg/min IntraVENous TITRATE    chlorhexidine (PERIDEX) 0.12 % mouthwash 15 mL  15 mL Oral BID    mupirocin (BACTROBAN) 2 % ointment   Both Nostrils BID    piperacillin-tazobactam (ZOSYN) 3.375 g in 0.9% sodium chloride (MBP/ADV) 100 mL  3.375 g IntraVENous Q8H    sodium chloride (NS) flush 5-10 mL  5-10 mL IntraVENous Q8H    sodium chloride (NS) flush 5-10 mL  5-10 mL IntraVENous PRN    acetaminophen (TYLENOL) tablet 650 mg  650 mg Oral Q4H PRN    ondansetron (ZOFRAN) injection 4 mg  4 mg IntraVENous Q4H PRN    hydrALAZINE (APRESOLINE) 20 mg/mL injection 20 mg  20 mg IntraVENous Q6H PRN    zinc oxide-cod liver oil (DESITIN) 40 % paste   Topical PRN        Objective:     Blood pressure 119/62, pulse (!) 106, temperature 98.6 °F (37 °C), resp. rate 20, height 6' (1.829 m), weight 88.7 kg (195 lb 8.8 oz), SpO2 96 %.     07/03 0701 - 07/03 1900  In: 64 [I.V.:64]  Out: -     07/01 1901 - 07/03 0700  In: 1887.9 [I.V.:1737.9]  Out: 1225 [Urine:3190]        Physical Examination: General:intubated  HEENT:  ETT  Chest:  CTA,   Heart: S1, S2, RRR  GI: Soft,  ND dec bowel sounds      Data Review    Recent Results (from the past 24 hour(s))   METABOLIC PANEL, BASIC    Collection Time: 07/02/17  5:08 PM   Result Value Ref Range    Sodium 140 136 - 145 mmol/L    Potassium 3.3 (L) 3.5 - 5.1 mmol/L    Chloride 108 97 - 108 mmol/L    CO2 23 21 - 32 mmol/L    Anion gap 9 5 - 15 mmol/L    Glucose 91 65 - 100 mg/dL    BUN 19 6 - 20 MG/DL    Creatinine 0.93 0.70 - 1.30 MG/DL    BUN/Creatinine ratio 20 12 - 20      GFR est AA >60 >60 ml/min/1.73m2    GFR est non-AA >60 >60 ml/min/1.73m2    Calcium 7.4 (L) 8.5 - 10.1 MG/DL   CULTURE, RESPIRATORY/SPUTUM/BRONCH W GRAM STAIN    Collection Time: 07/02/17  5:14 PM   Result Value Ref Range    Special Requests: NO SPECIAL REQUESTS      GRAM STAIN OCCASIONAL  WBCS SEEN        GRAM STAIN RARE  EPITHELIAL CELLS SEEN        GRAM STAIN NO ORGANISMS SEEN      Culture result: PENDING    CBC WITH AUTOMATED DIFF    Collection Time: 07/03/17  3:52 AM   Result Value Ref Range    WBC 11.0 4.1 - 11.1 K/uL    RBC 3.73 (L) 4.10 - 5.70 M/uL    HGB 12.3 12.1 - 17.0 g/dL    HCT 36.9 36.6 - 50.3 %    MCV 98.9 80.0 - 99.0 FL    MCH 33.0 26.0 - 34.0 PG    MCHC 33.3 30.0 - 36.5 g/dL    RDW 13.6 11.5 - 14.5 %    PLATELET 489 (L) 457 - 400 K/uL    NEUTROPHILS 69 32 - 75 %    LYMPHOCYTES 23 12 - 49 %    MONOCYTES 8 5 - 13 %    EOSINOPHILS 0 0 - 7 %    BASOPHILS 0 0 - 1 %    ABS. NEUTROPHILS 7.6 1.8 - 8.0 K/UL    ABS. LYMPHOCYTES 2.5 0.8 - 3.5 K/UL    ABS. MONOCYTES 0.9 0.0 - 1.0 K/UL    ABS. EOSINOPHILS 0.0 0.0 - 0.4 K/UL    ABS.  BASOPHILS 0.0 0.0 - 0.1 K/UL    RBC COMMENTS NORMOCYTIC, NORMOCHROMIC      WBC COMMENTS REACTIVE LYMPHS      DF SMEAR SCANNED     METABOLIC PANEL, BASIC    Collection Time: 07/03/17  3:52 AM   Result Value Ref Range    Sodium 137 136 - 145 mmol/L    Potassium 3.0 (L) 3.5 - 5.1 mmol/L    Chloride 104 97 - 108 mmol/L    CO2 22 21 - 32 mmol/L    Anion gap 11 5 - 15 mmol/L    Glucose 72 65 - 100 mg/dL    BUN 16 6 - 20 MG/DL    Creatinine 1.01 0.70 - 1.30 MG/DL    BUN/Creatinine ratio 16 12 - 20      GFR est AA >60 >60 ml/min/1.73m2    GFR est non-AA >60 >60 ml/min/1.73m2    Calcium 7.3 (L) 8.5 - 10.1 MG/DL   MAGNESIUM    Collection Time: 07/03/17  3:52 AM   Result Value Ref Range    Magnesium 2.8 (H) 1.6 - 2.4 mg/dL   PHOSPHORUS    Collection Time: 07/03/17  3:52 AM   Result Value Ref Range    Phosphorus 1.4 (L) 2.6 - 4.7 MG/DL   LACTIC ACID    Collection Time: 07/03/17  3:52 AM   Result Value Ref Range    Lactic acid 1.1 0.4 - 2.0 MMOL/L   CK W/ CKMB & INDEX    Collection Time: 07/03/17  3:52 AM   Result Value Ref Range     39 - 308 U/L    CK - MB 1.8 <3.6 NG/ML    CK-MB Index 0.8 0 - 2.5     TROPONIN I    Collection Time: 07/03/17  3:52 AM   Result Value Ref Range    Troponin-I, Qt. 0.10 (H) <0.05 ng/mL   BLOOD GAS, ARTERIAL    Collection Time: 07/03/17  4:17 AM   Result Value Ref Range    pH 7.42 7.35 - 7.45      PCO2 35 35.0 - 45.0 mmHg    PO2 97 80 - 100 mmHg    O2 SAT 98 (H) 92 - 97 %    BICARBONATE 22 22 - 26 mmol/L    BASE DEFICIT 1.4 mmol/L    O2 METHOD VENTILATOR      FIO2 40 %    MODE A/C      Tidal volume 475      SET RATE 14      EPAP/CPAP/PEEP 6.0      Sample source ARTERIAL      SITE RIGHT RADIAL      GLEN'S TEST YES       Recent Labs      07/03/17   0352  07/02/17   0438   WBC  11.0  13.2*   HGB  12.3  12.0*   HCT  36.9  35.4*   PLT  149*  178     Recent Labs      07/03/17   0352  07/02/17   1708  07/02/17   0438  07/01/17   2202   NA  137  140  138  135*   K  3.0*  3.3*  2.7*  2.8*   CL  104  108  107  101   CO2  22  23  23  23   BUN  16  19  15  18   CREA  1.01  0.93  0.75  1.11   GLU  72  91  142*  180*   CA  7.3*  7.4*  6.8*  8.3*   MG  2.8*   --    --   3.2*   PHOS  1.4*   --    --    --      Recent Labs      07/01/17 2202 06/30/17   2106   SGOT  35  21   AP  88  82   TP  7.3  7.2   ALB  3.7  3.7   GLOB  3.6  3.5   LPSE  81  127     Recent Labs      07/02/17   0042   INR  1.3*   PTP  12.7*   APTT  30.1      No results for input(s): FE, TIBC, PSAT, FERR in the last 72 hours. No results found for: FOL, RBCF   Recent Labs      07/03/17   0417  07/02/17   0430   PH  7.42  7.36   PCO2  35  44   PO2  97  88     Recent Labs      07/03/17   0352   CPK  225   CKNDX  0.8   TROIQ  0.10*     No results found for: CHOL, CHOLX, CHLST, CHOLV, HDL, LDL, LDLC, DLDLP, TGLX, TRIGL, TRIGP, CHHD, CHHDX  No components found for: [place]  Lab Results   Component Value Date/Time    Color YELLOW/STRAW 07/02/2017 04:38 AM    Appearance CLEAR 07/02/2017 04:38 AM    Specific gravity 1.020 07/02/2017 04:38 AM    Specific gravity 1.022 06/30/2017 10:32 PM    pH (UA) 6.0 07/02/2017 04:38 AM    Protein 100 07/02/2017 04:38 AM    Glucose NEGATIVE  07/02/2017 04:38 AM    Ketone TRACE 07/02/2017 04:38 AM    Bilirubin NEGATIVE  11/05/2016 01:30 PM    Urobilinogen 1.0 07/02/2017 04:38 AM    Nitrites NEGATIVE  07/02/2017 04:38 AM    Leukocyte Esterase NEGATIVE  07/02/2017 04:38 AM    Epithelial cells FEW 07/02/2017 04:38 AM    Bacteria NEGATIVE  07/02/2017 04:38 AM    WBC 0-4 07/02/2017 04:38 AM    RBC 10-20 07/02/2017 04:38 AM        ROS: -CP, SOB, Dysuria, palpitations, cough. Assessment:    Colonic distension,  Sigmoid volvulus,  Hypokalemia    Active Problems:    Respiratory failure ([address] (7/2/2017)             Plan/Discussion:     1. Aggressive K+ replacement, d/w his RN. 2. Will replace phos,  3. Continue NGT to suction. 4. Repeat KUB today. 5. Following with you. Signed By: Jhon Hummel.  Steph Soto MD    7/3/2017  11:01 AM

## 2017-07-03 NOTE — WOUND CARE
Pressure Ulcer Prevention In basket Alert Received for Alban < 14 (moderate risk).      Suggested Care Plan/Interventions for Nursing  1. Complete [place] Pressure Ulcer Risk Scale and use sub scores to identify appropriate interventions. 2. Perform Assessment: skin, changes in LOC, visual cues for pain, monitor skin under medical devices  3. Respond to Reduced Sensory Perception: changes in LOC, check visual cues for pain, float heels, suspension boots, pressure redistribution bed/mattress/chair cushion, turning and reposition approximately every 2 hours (pillows & wedges), pad between skin to skin, turn & reposition  4. Manage Moisture: absorbent under pads, internal / external urinary device, internal /  external fecal device, minimize layers, contain wound drainage, access need for specialty bed, limit adult briefs, maintain skin hydration (lotion/cream), moisture barrier, offer toileting every hour  5. Promote Activity: increase time out of bed, chair cushion, PT/OT evaluation, trapeze to reposition, pressure redistribution bed/mattress/chair  6. Address Reduced Mobility: float heels / suspension boot, HOB 30 degrees or less, pressure redistribution bed/mattress/cushion, PT / OT evaluation, turn and reposition approximately every 2 hours (pillows & wedges)  7. Promote Nutrition: document food / fluid / supplement intake, encourage/assist with meals as needed  8. Reduce Friction and Shear: transferring/repositioning devices (lift/draw sheet), lift team/ patient mobility team, feet elevated on foot rest, minimize layers, foam dressing / transparent film / skin sealants, protective barrier creams and emollients, transfer aides (board, [place] lift, ceiling lift, stand assist), HOB 30 degrees or less, trapeze to reposition.   Wound Care Team

## 2017-07-03 NOTE — PROGRESS NOTES
Surgery      Surgical consult was obtained following colonoscopy and rectal tube placement by Dr Tita High for sigmoid volvulus    Rectal tube has come out but nursing reports continued passage of stool. Has NG in place    Pt intubated    Abd soft  WBC 11  Lactic acid 1.1    Pt will need bowel regimen to prevent future constipation. Will continue to follow for now. Films in AM      Anna Henson.  Andreina Serna MD, [facility]

## 2017-07-04 ENCOUNTER — APPOINTMENT (OUTPATIENT)
Dept: GENERAL RADIOLOGY | Age: 77
DRG: 871 | End: 2017-07-04
Attending: INTERNAL MEDICINE
Payer: MEDICARE

## 2017-07-04 ENCOUNTER — APPOINTMENT (OUTPATIENT)
Dept: GENERAL RADIOLOGY | Age: 77
DRG: 871 | End: 2017-07-04
Attending: FAMILY MEDICINE
Payer: MEDICARE

## 2017-07-04 LAB
ALBUMIN SERPL BCP-MCNC: 2.6 G/DL (ref 3.5–5)
ALBUMIN/GLOB SERPL: 0.8 {RATIO} (ref 1.1–2.2)
ALP SERPL-CCNC: 54 U/L (ref 45–117)
ALT SERPL-CCNC: 19 U/L (ref 12–78)
ANION GAP BLD CALC-SCNC: 8 MMOL/L (ref 5–15)
AST SERPL W P-5'-P-CCNC: 31 U/L (ref 15–37)
BACTERIA SPEC CULT: NORMAL
BILIRUB SERPL-MCNC: 0.7 MG/DL (ref 0.2–1)
BUN SERPL-MCNC: 11 MG/DL (ref 6–20)
BUN/CREAT SERPL: 16 (ref 12–20)
CALCIUM SERPL-MCNC: 7.2 MG/DL (ref 8.5–10.1)
CHLORIDE SERPL-SCNC: 107 MMOL/L (ref 97–108)
CO2 SERPL-SCNC: 26 MMOL/L (ref 21–32)
CREAT SERPL-MCNC: 0.69 MG/DL (ref 0.7–1.3)
ERYTHROCYTE [DISTWIDTH] IN BLOOD BY AUTOMATED COUNT: 13.4 % (ref 11.5–14.5)
GLOBULIN SER CALC-MCNC: 3.2 G/DL (ref 2–4)
GLUCOSE SERPL-MCNC: 96 MG/DL (ref 65–100)
GRAM STN SPEC: NORMAL
HCT VFR BLD AUTO: 34.1 % (ref 36.6–50.3)
HGB BLD-MCNC: 11.5 G/DL (ref 12.1–17)
LACTATE SERPL-SCNC: 0.6 MMOL/L (ref 0.4–2)
MAGNESIUM SERPL-MCNC: 2.4 MG/DL (ref 1.6–2.4)
MCH RBC QN AUTO: 32.7 PG (ref 26–34)
MCHC RBC AUTO-ENTMCNC: 33.7 G/DL (ref 30–36.5)
MCV RBC AUTO: 96.9 FL (ref 80–99)
PHOSPHATE SERPL-MCNC: 1.9 MG/DL (ref 2.6–4.7)
PLATELET # BLD AUTO: 160 K/UL (ref 150–400)
POTASSIUM SERPL-SCNC: 3.5 MMOL/L (ref 3.5–5.1)
PROT SERPL-MCNC: 5.8 G/DL (ref 6.4–8.2)
RBC # BLD AUTO: 3.52 M/UL (ref 4.1–5.7)
SERVICE CMNT-IMP: NORMAL
SODIUM SERPL-SCNC: 141 MMOL/L (ref 136–145)
WBC # BLD AUTO: 8 K/UL (ref 4.1–11.1)

## 2017-07-04 PROCEDURE — 74011250636 HC RX REV CODE- 250/636: Performed by: INTERNAL MEDICINE

## 2017-07-04 PROCEDURE — 65660000000 HC RM CCU STEPDOWN

## 2017-07-04 PROCEDURE — 83735 ASSAY OF MAGNESIUM: CPT | Performed by: INTERNAL MEDICINE

## 2017-07-04 PROCEDURE — 74011000250 HC RX REV CODE- 250: Performed by: INTERNAL MEDICINE

## 2017-07-04 PROCEDURE — 74011000258 HC RX REV CODE- 258: Performed by: EMERGENCY MEDICINE

## 2017-07-04 PROCEDURE — C9113 INJ PANTOPRAZOLE SODIUM, VIA: HCPCS | Performed by: INTERNAL MEDICINE

## 2017-07-04 PROCEDURE — 71010 XR CHEST PORT: CPT

## 2017-07-04 PROCEDURE — 74011250637 HC RX REV CODE- 250/637: Performed by: INTERNAL MEDICINE

## 2017-07-04 PROCEDURE — 74011250636 HC RX REV CODE- 250/636: Performed by: EMERGENCY MEDICINE

## 2017-07-04 PROCEDURE — 74011000258 HC RX REV CODE- 258: Performed by: INTERNAL MEDICINE

## 2017-07-04 PROCEDURE — 36415 COLL VENOUS BLD VENIPUNCTURE: CPT | Performed by: INTERNAL MEDICINE

## 2017-07-04 PROCEDURE — 85027 COMPLETE CBC AUTOMATED: CPT | Performed by: INTERNAL MEDICINE

## 2017-07-04 PROCEDURE — 74020 XR ABD FLAT/ ERECT: CPT

## 2017-07-04 PROCEDURE — 80053 COMPREHEN METABOLIC PANEL: CPT | Performed by: INTERNAL MEDICINE

## 2017-07-04 PROCEDURE — 84100 ASSAY OF PHOSPHORUS: CPT | Performed by: INTERNAL MEDICINE

## 2017-07-04 PROCEDURE — 77010033678 HC OXYGEN DAILY

## 2017-07-04 PROCEDURE — 83605 ASSAY OF LACTIC ACID: CPT | Performed by: INTERNAL MEDICINE

## 2017-07-04 RX ORDER — METRONIDAZOLE 500 MG/100ML
500 INJECTION, SOLUTION INTRAVENOUS EVERY 8 HOURS
Status: DISCONTINUED | OUTPATIENT
Start: 2017-07-04 | End: 2017-07-04

## 2017-07-04 RX ORDER — POLYETHYLENE GLYCOL 3350 17 G/17G
17 POWDER, FOR SOLUTION ORAL DAILY
Status: DISCONTINUED | OUTPATIENT
Start: 2017-07-05 | End: 2017-07-08 | Stop reason: HOSPADM

## 2017-07-04 RX ADMIN — LORAZEPAM 0.5 MG: 2 INJECTION INTRAMUSCULAR; INTRAVENOUS at 22:56

## 2017-07-04 RX ADMIN — Medication 10 ML: at 19:53

## 2017-07-04 RX ADMIN — GUAIFENESIN AND DEXTROMETHORPHAN 10 ML: 100; 10 SYRUP ORAL at 19:52

## 2017-07-04 RX ADMIN — AMIODARONE HYDROCHLORIDE 1 MG/MIN: 50 INJECTION, SOLUTION INTRAVENOUS at 00:22

## 2017-07-04 RX ADMIN — CEFTRIAXONE 1 G: 1 INJECTION, POWDER, FOR SOLUTION INTRAMUSCULAR; INTRAVENOUS at 11:06

## 2017-07-04 RX ADMIN — Medication 10 ML: at 22:58

## 2017-07-04 RX ADMIN — AMIODARONE HYDROCHLORIDE 0.5 MG/MIN: 50 INJECTION, SOLUTION INTRAVENOUS at 08:10

## 2017-07-04 RX ADMIN — DEXTROSE MONOHYDRATE 10 MG/HR: 50 INJECTION, SOLUTION INTRAVENOUS at 06:02

## 2017-07-04 RX ADMIN — LORAZEPAM 0.5 MG: 2 INJECTION INTRAMUSCULAR; INTRAVENOUS at 17:07

## 2017-07-04 RX ADMIN — ENOXAPARIN SODIUM 40 MG: 40 INJECTION SUBCUTANEOUS at 11:07

## 2017-07-04 RX ADMIN — Medication 10 ML: at 13:37

## 2017-07-04 RX ADMIN — SODIUM CHLORIDE 40 MG: 9 INJECTION, SOLUTION INTRAMUSCULAR; INTRAVENOUS; SUBCUTANEOUS at 09:25

## 2017-07-04 RX ADMIN — GUAIFENESIN AND DEXTROMETHORPHAN 10 ML: 100; 10 SYRUP ORAL at 11:06

## 2017-07-04 RX ADMIN — BENZONATATE 100 MG: 100 CAPSULE ORAL at 04:05

## 2017-07-04 RX ADMIN — BENZONATATE 100 MG: 100 CAPSULE ORAL at 19:52

## 2017-07-04 RX ADMIN — METRONIDAZOLE 500 MG: 500 INJECTION, SOLUTION INTRAVENOUS at 09:26

## 2017-07-04 RX ADMIN — PIPERACILLIN SODIUM,TAZOBACTAM SODIUM 3.38 G: 3; .375 INJECTION, POWDER, FOR SOLUTION INTRAVENOUS at 01:38

## 2017-07-04 RX ADMIN — BENZONATATE 100 MG: 100 CAPSULE ORAL at 11:18

## 2017-07-04 RX ADMIN — MUPIROCIN: 20 OINTMENT TOPICAL at 09:24

## 2017-07-04 RX ADMIN — METRONIDAZOLE 500 MG: 500 INJECTION, SOLUTION INTRAVENOUS at 17:08

## 2017-07-04 RX ADMIN — AMIODARONE HYDROCHLORIDE 150 MG: 50 INJECTION, SOLUTION INTRAVENOUS at 00:22

## 2017-07-04 RX ADMIN — LORAZEPAM 0.5 MG: 2 INJECTION INTRAMUSCULAR; INTRAVENOUS at 00:55

## 2017-07-04 NOTE — PROGRESS NOTES
[facility]  Pulmonary, Critical Care, and Sleep Medicine    Name: Caesar Sosa MRN: [id number]   : 1940 Hospital: [facility]   Date: 2017        Critical Care    IMPRESSION:   · Respiratory failure- failed SBT  · PAF RVR  · Sigmoid volvulus s/p emergent endoscopic decompression  · SIRS  · Leucocytosis  · Lactic acidosis  · CKD  · Chronic steroids   · Low K      RECOMMENDATIONS:   · Extubated, wean O2  · DC cardizem, continue amio drip  · GI/Surgery following   · Empiric antibiotics   · Monitor renal function  · Hold on TPN for a day or so  · DVT and GI prophylaxis  · Mobilize  · Transfer to tele today      Subjective/History:     Extubated, no acute events overnight  No acute complaints  No acute distress      Current Facility-Administered Medications   Medication Dose Route Frequency    cefTRIAXone (ROCEPHIN) 1 g in 0.9% sodium chloride (MBP/ADV) 50 mL  1 g IntraVENous Q24H    metroNIDAZOLE (FLAGYL) IVPB premix 500 mg  500 mg IntraVENous Q8H    enoxaparin (LOVENOX) injection 40 mg  40 mg SubCUTAneous Q24H    pantoprazole (PROTONIX) 40 mg in sodium chloride 0.9 % 10 mL injection  40 mg IntraVENous DAILY    sodium chloride (NS) flush 10 mL  10 mL InterCATHeter Q24H    sodium chloride (NS) flush 10-40 mL  10-40 mL InterCATHeter Q8H    dilTIAZem (CARDIZEM) 100 mg in dextrose 5% (MBP/ADV) 100 mL infusion  0-15 mg/hr IntraVENous TITRATE    amiodarone (CORDARONE) 450 mg in dextrose 5% 250 mL infusion  0.5-1 mg/min IntraVENous CONTINUOUS    mupirocin (BACTROBAN) 2 % ointment   Both Nostrils BID         Review of Systems:  No acute complaints    Objective:   Vital Signs:    Visit Vitals    /62    Pulse 68    Temp 98.9 °F (37.2 °C)    Resp 21    Ht 6' (1.829 m)    Wt 88.7 kg (195 lb 8.8 oz)    SpO2 97%    BMI 26.52 kg/m2       O2 Device: Nasal cannula   O2 Flow Rate (L/min): 2 l/min   Temp (24hrs), Av.7 °F (37.1 °C), Min:98 °F (36.7 °C), Max:99.1 °F (37.3 °C)       Intake/Output:   Last shift:         Last 3 shifts: 07/02 1901 - 07/04 0700  In: 1727.4 [I.V.:1727.4]  Out: 2815 [Urine:2815]    Intake/Output Summary (Last 24 hours) at 07/04/17 0827  Last data filed at 07/04/17 0700   Gross per 24 hour   Intake          1282. 85 ml   Output             1005 ml   Net           277.85 ml         Physical Exam:    General:  No distress    Head:  Normocephalic, without obvious abnormality, atraumatic. Eyes:  Conjunctivae/corneas clear. PERRL, EOMs intact. Nose: Nares normal. Septum midline. Mucosa normal. No drainage or sinus tenderness. Throat: clear   Neck: Supple, symmetrical, trachea midline, no adenopathy, thyroid: no enlargment/tenderness/nodules, no carotid bruit and no JVD. Back:   Symmetric, no curvature. ROM normal.   Lungs:   Clear to auscultation bilaterally. Chest wall:  No tenderness or deformity. Heart:  Regular rate and rhythm, S1, S2 normal, no murmur, click, rub or gallop. Abdomen:   Some distension   Extremities: Extremities normal, atraumatic, no cyanosis or edema. Pulses: 2+ and symmetric all extremities.    Skin: Skin color, texture, turgor normal. No rashes or lesions   Lymph nodes: Cervical, supraclavicular, and axillary nodes normal.   Neurologic: Non focal        Data:     Recent Results (from the past 24 hour(s))   BLOOD GAS, ARTERIAL    Collection Time: 07/03/17 12:28 PM   Result Value Ref Range    pH 7.45 7.35 - 7.45      PCO2 34 (L) 35.0 - 45.0 mmHg    PO2 62 (L) 80 - 100 mmHg    O2 SAT 93 92 - 97 %    BICARBONATE 23 22 - 26 mmol/L    BASE DEFICIT 0.4 mmol/L    O2 METHOD VENTILATOR      FIO2 30 %    MODE CPAP      PRESSURE SUPPORT 5.0      EPAP/CPAP/PEEP 6.0      Sample source ARTERIAL      SITE RIGHT BRACHIAL      GLEN'S TEST N/A     POTASSIUM    Collection Time: 07/03/17  5:45 PM   Result Value Ref Range    Potassium 3.3 (L) 3.5 - 5.1 mmol/L   METABOLIC PANEL, COMPREHENSIVE    Collection Time: 07/04/17  4:10 AM   Result Value Ref Range    Sodium 141 136 - 145 mmol/L    Potassium 3.5 3.5 - 5.1 mmol/L    Chloride 107 97 - 108 mmol/L    CO2 26 21 - 32 mmol/L    Anion gap 8 5 - 15 mmol/L    Glucose 96 65 - 100 mg/dL    BUN 11 6 - 20 MG/DL    Creatinine 0.69 (L) 0.70 - 1.30 MG/DL    BUN/Creatinine ratio 16 12 - 20      GFR est AA >60 >60 ml/min/1.73m2    GFR est non-AA >60 >60 ml/min/1.73m2    Calcium 7.2 (L) 8.5 - 10.1 MG/DL    Bilirubin, total 0.7 0.2 - 1.0 MG/DL    ALT (SGPT) 19 12 - 78 U/L    AST (SGOT) 31 15 - 37 U/L    Alk. phosphatase 54 45 - 117 U/L    Protein, total 5.8 (L) 6.4 - 8.2 g/dL    Albumin 2.6 (L) 3.5 - 5.0 g/dL    Globulin 3.2 2.0 - 4.0 g/dL    A-G Ratio 0.8 (L) 1.1 - 2.2     MAGNESIUM    Collection Time: 07/04/17  4:10 AM   Result Value Ref Range    Magnesium 2.4 1.6 - 2.4 mg/dL   PHOSPHORUS    Collection Time: 07/04/17  4:10 AM   Result Value Ref Range    Phosphorus 1.9 (L) 2.6 - 4.7 MG/DL   LACTIC ACID    Collection Time: 07/04/17  4:10 AM   Result Value Ref Range    Lactic acid 0.6 0.4 - 2.0 MMOL/L   CBC W/O DIFF    Collection Time: 07/04/17  4:10 AM   Result Value Ref Range    WBC 8.0 4.1 - 11.1 K/uL    RBC 3.52 (L) 4.10 - 5.70 M/uL    HGB 11.5 (L) 12.1 - 17.0 g/dL    HCT 34.1 (L) 36.6 - 50.3 %    MCV 96.9 80.0 - 99.0 FL    MCH 32.7 26.0 - 34.0 PG    MCHC 33.7 30.0 - 36.5 g/dL    RDW 13.4 11.5 - 14.5 %    PLATELET 365 561 - 385 K/uL                 Imaging:  I have personally reviewed the patients radiographs and have reviewed the reports:           Total critical care time exclusive of procedures:  minutes  Isauro Hinton MD

## 2017-07-04 NOTE — PROGRESS NOTES
2032 Bedside and Verbal shift change report given to Leyla RN (oncoming nurse) by Aurelia Adkins RN (offgoing nurse). Report included the following information SBAR, Kardex, MAR, Recent Results, Med Rec Status, Cardiac Rhythm a.fib and Alarm Parameters . Intravenous fluids: Amio at 0.5mcg/min, Cardizem 10mg/hr. NS at 10  Restraint: no  Huang:yes    0910 TRANSFER - OUT REPORT:    Verbal report given to Coni Goff Rn(name) on Dottie Triana  being transferred to [address] [place] [address] (unit) for routine progression of care       Report consisted of patients Situation, Background, Assessment and   Recommendations(SBAR). Information from the following report(s) SBAR, Kardex, Procedure Summary, Intake/Output, MAR, Recent Results, Med Rec Status, Cardiac Rhythm nsr and Alarm Parameters  was reviewed with the receiving nurse. Lines:   PICC Triple Lumen 90/50/01 Right;Basilic (Active)   Central Line Being Utilized Yes 7/4/2017  8:00 AM   Criteria for Appropriate Use Limited/no vessel suitable for conventional peripheral access 7/4/2017  8:00 AM   Site Assessment Clean, dry, & intact 7/4/2017  8:00 AM   Phlebitis Assessment 0 7/4/2017  8:00 AM   Infiltration Assessment 0 7/4/2017  8:00 AM   Arm Circumference (cm) 32 cm 7/3/2017 11:51 AM   Date of Last Dressing Change 07/03/17 7/4/2017  4:00 AM   Dressing Status Clean, dry, & intact 7/4/2017  8:00 AM   External Catheter Length (cm) 0 centimeters 7/3/2017 11:51 AM   Dressing Type Disk with Chlorhexadine gluconate (CHG); Tape;Transparent 7/4/2017  8:00 AM   Action Taken Other (comment) 7/3/2017 11:51 AM   Hub Color/Line Status White; Infusing 7/4/2017  8:00 AM   Positive Blood Return (Site #1) Yes 7/4/2017  8:00 AM   Hub Color/Line Status Allison Maizes; Infusing 7/4/2017  8:00 AM   Positive Blood Return (Site #2) Yes 7/4/2017  8:00 AM   Hub Color/Line Status Red; Infusing 7/4/2017  8:00 AM   Positive Blood Return (Site #3) Yes 7/4/2017 12:00 AM   Alcohol Cap Used Yes 7/4/2017  4:00 AM       Peripheral IV 07/02/17 Left Arm (Active)   Site Assessment Clean, dry, & intact 7/4/2017  8:00 AM   Phlebitis Assessment 0 7/4/2017  8:00 AM   Infiltration Assessment 0 7/4/2017  8:00 AM   Dressing Status Clean, dry, & intact 7/4/2017  8:00 AM   Dressing Type Tape;Transparent 7/4/2017  8:00 AM   Hub Color/Line Status Green;Capped 7/4/2017  8:00 AM        Opportunity for questions and clarification was provided. Patient transported with:   Monitor  Registered Nurse  Tech   Huang will be removed prior to transfer. Attempted to call wife to inform her of transfer no answer at this time.

## 2017-07-04 NOTE — PROGRESS NOTES
GI PROGRESS NOTE    NAME:             Gogo Valencia   :              1940   MRN:              [id number]   Admit Date:     2017  Todays Date:  2017      Subjective:           NG tube and rectal tubes out. Moved to floor bed  No complaints.     Medications-reviewed     Current Facility-Administered Medications   Medication Dose Route Frequency    cefTRIAXone (ROCEPHIN) 1 g in 0.9% sodium chloride (MBP/ADV) 50 mL  1 g IntraVENous Q24H    metroNIDAZOLE (FLAGYL) IVPB premix 500 mg  500 mg IntraVENous Q8H    enoxaparin (LOVENOX) injection 40 mg  40 mg SubCUTAneous Q24H    pantoprazole (PROTONIX) 40 mg in sodium chloride 0.9 % 10 mL injection  40 mg IntraVENous DAILY    sodium chloride (NS) flush 10-30 mL  10-30 mL InterCATHeter PRN    sodium chloride (NS) flush 10 mL  10 mL InterCATHeter Q24H    sodium chloride (NS) flush 10 mL  10 mL InterCATHeter PRN    sodium chloride (NS) flush 10-40 mL  10-40 mL InterCATHeter Q8H    sodium chloride (NS) flush 20 mL  20 mL InterCATHeter PRN    heparin (porcine) pf 300 Units  300 Units InterCATHeter PRN    LORazepam (ATIVAN) injection 0.5 mg  0.5 mg IntraVENous Q4H PRN    benzonatate (TESSALON) capsule 100 mg  100 mg Oral Q8H PRN    guaiFENesin-dextromethorphan (ROBITUSSIN DM) 100-10 mg/5 mL syrup 10 mL  10 mL Oral Q6H PRN    amiodarone (CORDARONE) 450 mg in dextrose 5% 250 mL infusion  0.5-1 mg/min IntraVENous CONTINUOUS    mupirocin (BACTROBAN) 2 % ointment   Both Nostrils BID    sodium chloride (NS) flush 5-10 mL  5-10 mL IntraVENous PRN    acetaminophen (TYLENOL) tablet 650 mg  650 mg Oral Q4H PRN    ondansetron (ZOFRAN) injection 4 mg  4 mg IntraVENous Q4H PRN    hydrALAZINE (APRESOLINE) 20 mg/mL injection 20 mg  20 mg IntraVENous Q6H PRN    zinc oxide-cod liver oil (DESITIN) 40 % paste   Topical PRN        Objective:   Patient Vitals for the past 8 hrs:   BP Temp Pulse Resp SpO2   17 0846 - - - - 96 %   17 0800 159/66 97.7 °F (36.5 °C) 74 25 96 %   07/04/17 0700 150/62 - 68 21 97 %   07/04/17 0600 152/66 - 86 21 96 %   07/04/17 0400 144/63 98.9 °F (37.2 °C) 91 24 98 %   07/04/17 0300 (!) 141/100 - 88 27 97 %     07/04 0701 - 07/04 1900  In: 26.7 [I.V.:26.7]  Out: -   07/02 1901 - 07/04 0700  In: 1727.4 [I.V.:1727.4]  Out: 2206 [Urine:2815]    EXAM:     NEURO-a&o   HEENT-wnl   LUNGS-clear   COR-regular rate and rhythym   ABD-soft, no tenderness        LABS:  Recent Labs      07/04/17 0410 07/03/17 0352   WBC  8.0  11.0   HGB  11.5*  12.3   HCT  34.1*  36.9   PLT  160  149*     Recent Labs      07/04/17 0410 07/03/17   1745  07/03/17 0352  07/02/17   1708   07/01/17 2202   NA  141   --   137  140   < >  135*   K  3.5  3.3*  3.0*  3.3*   < >  2.8*   CL  107   --   104  108   < >  101   CO2  26   --   22  23   < >  23   BUN  11   --   16  19   < >  18   CREA  0.69*   --   1.01  0.93   < >  1.11   GLU  96   --   72  91   < >  180*   CA  7.2*   --   7.3*  7.4*   < >  8.3*   MG  2.4   --   2.8*   --    --   3.2*   PHOS  1.9*   --   1.4*   --    --    --     < > = values in this interval not displayed. Recent Labs      07/04/17 0410 07/01/17 2202   SGOT  31  35   AP  54  88   TBILI  0.7  0.7   TP  5.8*  7.3   ALB  2.6*  3.7   GLOB  3.2  3.6   LPSE   --   81   ALT  19  21     Recent Labs      07/02/17   0042   INR  1.3*   PTP  12.7*   APTT  30.1                             Assessment:   1. Sigmoid volvulus---decompressed         Active Problems:    [facility] (7/2/2017)        Plan:   1. Will start clear liquids  2.  From my viewpoint does not need antibiotics

## 2017-07-04 NOTE — PROGRESS NOTES
Cardiology Progress Note  7/4/2017    Admit Date: 7/1/2017  Admit Diagnosis: Respiratory failure ([facility]  colon decompression  CC:  None currently  Usual cardiologist:  Willie Campbell MD     Assessment (as per Dr Yoana Hagen): 1. Paroxysmal atrial fibrillation. RVR requiring rate control with diltiazem. Not on OP anticoagulation. 2. History of paroxysmal SVT converted with adenosine in 11/2016. 3. Chronic left bundle branch block. 4. 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 (Placed by Dr. Sandra Roblero with our practice). 5. Severe colonic distension with sigmoid volvulus, improving. S/p decompression. 6. Acute respiratory failure. Intubated. 7. Hypokalemia. 8. Chronic ASA therapy.     Plan  (as per Dr Yoana Hagen):      1. Rate control for now. Diltiazem infusion. 2. [place] 8057 for spontaneous conversion today. May need to nudge him to sinus, will see. May start amiodarone later today if K reasonably replete. 4. Not an immediate anticoagulation candidate. 5. He follows in our [facility] office, so I'll check the pacemaker data from the past to see what the burden of Afib has been before (though that won't change immediate plan). He definitely had an adenosine-sensitive SVT in 2016.         7/4: No complaints; NSR; Cont IV amio today @ 0.5; Off dilt gtt. Stable cardiac status. For other plans, see orders.   High complexity decision making was performed  X Yes   High-risk of decompensation with multiple organ involvement X Yes   Hospital problem list   Active Hospital Problems    Diagnosis Date Noted    Respiratory failure ([facility] 07/02/2017                                                         Subjective:  Patient reports  []   nothing; unable to communicate    []   intubated   Chest pain X none  consistent with:  Non-cardiac CP         Atypical CP     None now  On going  Anginal CP     Dyspnea X none  at rest  with exertion         improved unchanged  worse              PND X none  overnight       Orthopnea X none  improved  unchanged  worse   Presyncope X none  improved  unchanged  worse   Ambulated in hallway without symptoms   Yes   Ambulated in room without symptoms  Yes     ROS Hematuria:  Yes X No Dysuria:  Yes X No                (2+  other systems) Cough: Yes X No Sputum: Yes X No                 BRBPR:  Yes X No Melena: Yes X No   No change in family and social history from H&P/Consult note.   Objective:    Physical Exam:  24 hr VS reviewed, overall VSSAF  Temp (24hrs), Av.7 °F (37.1 °C), Min:98 °F (36.7 °C), Max:99.1 °F (37.3 °C)    Patient Vitals for the past 8 hrs:   Pulse   17 0800 74   17 0700 68   17 0600 86   17 0400 91   17 0300 88   17 0200 86   17 0100 77    Patient Vitals for the past 8 hrs:   Resp   17 0800 25   17 0700 21   17 0600 21   17 0400 24   17 0300 27   17 0200 24   17 0100 22    Patient Vitals for the past 8 hrs:   BP   17 0800 159/66   17 0700 150/62   17 0600 152/66   17 0400 144/63   17 0300 (!) 141/100   17 0200 131/59   17 0100 128/50        Intake/Output Summary (Last 24 hours) at 17 0850  Last data filed at 17 0800   Gross per 24 hour   Intake          1309.55 ml   Output             1005 ml   Net           304.55 ml     General: x WD,WN  Elderly  Cachetic x NAD     Agitated  Lethargic  Arousable  Obtunded     Sedated  On Bipap  Intubated                ENT/Palate: X WNL  Dry MM  anicteric                Respiratory:  CTA  Nl resp effort  Increased effort  No significant change    x scattered rhonchi  rales  improved  worse              Cardiovasc: X RRR  IRRR X Nl S1, S2 x No rub     No murmur X No new murmur  Murmur c/w: x No gallop    x No edema  BLE edema:+  RLE edema:+  LLE edema:+     Edema less  Edema more  Edema same  Edema worse    X Nl JVP  Elevated JVP  JVP same JVP worse    X Carotid wnl x abd aorta not palpated X no peripheral emboli noted                GI: X abd soft x nondistended X BS present X No organo- megaly noted              Skin: X Warm, dry  Cold extremites                  Neuro: x A/O x Grossly non- focal  Obtunded  Sedated     Lethargic  Arousable  intubated       cath site intact w/o hematoma or new bruit; distal pulse unchanged  Yes   Data Review:     Telemetry independently reviewed x sinus x V paced  parox afib  NSVT     ECG independently reviewed  NSR  afib  no significant changes  NSST-Tw chgs   x no new ECG provided for review   Lab results reviewed as noted below. Current medications reviewed as noted below. Recent Labs      07/03/17   1228  07/03/17   0417   PH  7.45  7.42   PCO2  34*  35   PO2  62*  97     Recent Labs      07/03/17   0352   CPK  225   CKMB  1.8     Recent Labs      07/04/17   0410  07/03/17   1745  07/03/17   0352  07/02/17   1708  07/02/17   0438  07/01/17   2202   NA  141   --   137  140  138  135*   K  3.5  3.3*  3.0*  3.3*  2.7*  2.8*   CL  107   --   104  108  107  101   CO2  26   --   22  23  23  23   BUN  11   --   16  19  15  18   CREA  0.69*   --   1.01  0.93  0.75  1.11   GLU  96   --   72  91  142*  180*   PHOS  1.9*   --   1.4*   --    --    --    CA  7.2*   --   7.3*  7.4*  6.8*  8.3*   ALB  2.6*   --    --    --    --   3.7   WBC  8.0   --   11.0   --   13.2*  16.0*   HGB  11.5*   --   12.3   --   12.0*  14.4   HCT  34.1*   --   36.9   --   35.4*  40.8   PLT  160   --   149*   --   178  232     Recent Labs      07/04/17   0410  07/01/17   2202   SGOT  31  35   AP  54  88   TBILI  0.7  0.7   TP  5.8*  7.3   ALB  2.6*  3.7   GLOB  3.2  3.6   LPSE   --   81     Recent Labs      07/02/17   0042   INR  1.3*   PTP  12.7*   APTT  30.1      No results for input(s): FE, TIBC, PSAT, FERR in the last 72 hours.    No results found for: GLUCPOC    Current Facility-Administered Medications   Medication Dose Route Frequency    cefTRIAXone (ROCEPHIN) 1 g in 0.9% sodium chloride (MBP/ADV) 50 mL  1 g IntraVENous Q24H    metroNIDAZOLE (FLAGYL) IVPB premix 500 mg  500 mg IntraVENous Q8H    enoxaparin (LOVENOX) injection 40 mg  40 mg SubCUTAneous Q24H    pantoprazole (PROTONIX) 40 mg in sodium chloride 0.9 % 10 mL injection  40 mg IntraVENous DAILY    sodium chloride (NS) flush 10-30 mL  10-30 mL InterCATHeter PRN    sodium chloride (NS) flush 10 mL  10 mL InterCATHeter Q24H    sodium chloride (NS) flush 10 mL  10 mL InterCATHeter PRN    sodium chloride (NS) flush 10-40 mL  10-40 mL InterCATHeter Q8H    sodium chloride (NS) flush 20 mL  20 mL InterCATHeter PRN    heparin (porcine) pf 300 Units  300 Units InterCATHeter PRN    LORazepam (ATIVAN) injection 0.5 mg  0.5 mg IntraVENous Q4H PRN    benzonatate (TESSALON) capsule 100 mg  100 mg Oral Q8H PRN    guaiFENesin-dextromethorphan (ROBITUSSIN DM) 100-10 mg/5 mL syrup 10 mL  10 mL Oral Q6H PRN    amiodarone (CORDARONE) 450 mg in dextrose 5% 250 mL infusion  0.5-1 mg/min IntraVENous CONTINUOUS    mupirocin (BACTROBAN) 2 % ointment   Both Nostrils BID    sodium chloride (NS) flush 5-10 mL  5-10 mL IntraVENous PRN    acetaminophen (TYLENOL) tablet 650 mg  650 mg Oral Q4H PRN    ondansetron (ZOFRAN) injection 4 mg  4 mg IntraVENous Q4H PRN    hydrALAZINE (APRESOLINE) 20 mg/mL injection 20 mg  20 mg IntraVENous Q6H PRN    zinc oxide-cod liver oil (DESITIN) 40 % paste   Topical PRN         Mehran Mcfadden MD

## 2017-07-04 NOTE — PROGRESS NOTES
Brief supportive visit on [address] [facility] unit. Patient's wife and son Carson Reardon were present. Provided listening presence as patient shared he is feeling much better. Family is relieved he has done so well. Offered assurance of continued prayer and availability of pastoral support.   LOUIE Garner, [facility], [address] [place] [address] [address]     Paging Service  [phone]

## 2017-07-04 NOTE — PROGRESS NOTES
Patient in A-fib with cardizem infusing at 15mg/hr. 7937- Patient given amio bolus with drip to infuse at completion of bolus. Patient medicated for anxiety X2.  Patient medicated for cough X2.   0700- Report to Leyla WHITT

## 2017-07-04 NOTE — PROGRESS NOTES
Surgery      Chart reviewed  Patient moved to floor  Doing well, no complaints    Not getting clears yet    Family asking about PT    Agree with patient not requiring antibx. Will need bowel regimen      Garrett Casper.  Brad Levine MD, [facility]

## 2017-07-04 NOTE — PROGRESS NOTES
Hospitalist Progress Note    NAME: Ambar Mireles   :  1940   MRN:  [id number]       Assessment / Plan:  Sigmoid volvulus with proximal colonic obstruction, severe distention POA  S/P emergent co,lonic decompression tube, now out,  No signs of perforation or ischemia on CT  Clinically improved this AM, KUB final report is pending  ? continue empiric antibiotics  Repeat KUB  GI/gen surg following    Afib with RVR  Now on amiodarone and cardizem gtt  Appears to be in NSR now, wean cardizem  Cardiology following    Hypokalemia POA  Aggressive repletion with KCl  monitor bmp, mag    Severe sepsis POA  Acute respiratory failure requiring emergent intubation, now extubated  E coli UTI POA pan sensitive  Leukocytosis, tachycardic, lactate 2.4, consider GI source CT with above findings but negative for colitis or ischemia  Wean antibiotics to ceftriaxone and flagyl(PCN allergic)  Blood cultures ntd, urine culture showed Ecoli     Hypertension POA  Holding home meds for now   Hydralazine PRN     Unspecified kidney disease  Family unclear of exact diagnosis but know that patient has been taking prednisone for the last year and is currently being tapered to 10 mg daily     Body mass index is 26.52 kg/(m^2). Code status: Full  Prophylaxis: Lovenox  Recommended Disposition: Home w/Family     Subjective:     Chief Complaint / Reason for Physician Visit  Pt seen at bedside, Discussed with RN events overnight.    \"my stomach does not hurt\"  Extubated, decompression tube is out  Says stomach feels better, no N/V or abdominal pain  Positve frequent cough    Review of Systems:  Symptom Y/N Comments  Symptom Y/N Comments   Fever/Chills n   Chest Pain n    Poor Appetite    Edema     Cough y   Abdominal Pain n    Sputum    Joint Pain     SOB/ANDERSON n   Pruritis/Rash     Nausea/vomit n   Tolerating PT/OT     Diarrhea    Tolerating Diet n    Constipation    Other       Could NOT obtain due to:      Objective:     VITALS:   Last 24hrs VS reviewed since prior progress note. Most recent are:  Patient Vitals for the past 24 hrs:   Temp Pulse Resp BP SpO2   07/04/17 0700 - 68 21 150/62 97 %   07/04/17 0600 - 86 21 152/66 96 %   07/04/17 0400 98.9 °F (37.2 °C) 91 24 144/63 98 %   07/04/17 0300 - 88 27 (!) 141/100 97 %   07/04/17 0200 - 86 24 131/59 98 %   07/04/17 0100 - 77 22 128/50 98 %   07/04/17 0000 99.1 °F (37.3 °C) 84 25 117/56 98 %   07/03/17 2300 - 79 23 145/61 97 %   07/03/17 2200 - 98 22 142/59 99 %   07/03/17 2100 - 93 22 146/52 99 %   07/03/17 2000 98 °F (36.7 °C) (!) 128 27 138/54 97 %   07/03/17 1802 - (!) 125 24 134/58 97 %   07/03/17 1730 - 90 26 131/54 97 %   07/03/17 1630 - 97 22 148/56 97 %   07/03/17 1600 - 90 25 132/52 97 %   07/03/17 1530 - 92 23 145/64 99 %   07/03/17 1500 - 85 21 146/54 99 %   07/03/17 1430 - 88 24 137/46 98 %   07/03/17 1415 - 89 22 132/63 97 %   07/03/17 1400 - 89 20 138/59 97 %   07/03/17 1345 - 91 25 138/46 97 %   07/03/17 1330 - 97 28 132/56 96 %   07/03/17 1315 - (!) 105 26 (!) 125/104 96 %   07/03/17 1300 - 91 19 133/59 98 %   07/03/17 1245 - 96 25 129/61 95 %   07/03/17 1230 - (!) 106 19 111/47 94 %   07/03/17 1215 - (!) 109 22 138/46 95 %   07/03/17 1200 - (!) 106 18 144/58 95 %   07/03/17 1145 - (!) 101 26 146/61 96 %   07/03/17 1130 - (!) 106 19 152/59 96 %   07/03/17 1115 - (!) 105 22 138/67 96 %   07/03/17 1100 - 94 22 120/52 96 %   07/03/17 1057 - (!) 106 20 - 96 %   07/03/17 1045 - 95 18 121/60 97 %   07/03/17 1030 - 96 19 119/62 97 %   07/03/17 1000 - (!) 101 21 97/58 98 %   07/03/17 0945 - (!) 101 21 109/54 97 %   07/03/17 0930 - (!) 106 18 106/71 97 %   07/03/17 0900 - (!) 106 18 115/50 97 %   07/03/17 0830 - (!) 115 18 106/52 98 %   07/03/17 0815 - (!) 122 20 114/57 97 %       Intake/Output Summary (Last 24 hours) at 07/04/17 0802  Last data filed at 07/04/17 0700   Gross per 24 hour   Intake          1382. 85 ml   Output             1005 ml   Net           377.85 ml        PHYSICAL EXAM:  General: WD, WN. Alert, cooperative, no acute distress, coughing  EENT:  Dry oral mucosa, no adenopathy  Resp:  Rhonchi bilaterally, no wheezing or rales. No accessory muscle use  CV:  irregular  rhythm,  No edema  GI:  Soft, Non distended, not tender  Neurologic:  Awake, alert, follows commands  Psych:   Not agitated or anxious  Skin:  No rashes. No jaundice    Reviewed most current lab test results and cultures  YES  Reviewed most current radiology test results   YES  Review and summation of old records today    NO  Reviewed patient's current orders and MAR    YES  PMH/SH reviewed - no change compared to H&P  ________________________________________________________________________  Care Plan discussed with:    Comments   Patient x    Family      RN x    Care Manager     Consultant  x Dr Maribell Guy team rounds were held today with , nursing, pharmacist and clinical coordinator. Patient's plan of care was discussed; medications were reviewed and discharge planning was addressed. ________________________________________________________________________  Total NON critical care TIME:  25   Minutes    Total CRITICAL CARE TIME Spent:   Minutes non procedure based      Comments   >50% of visit spent in counseling and coordination of care     ________________________________________________________________________  Jaziel Trinh MD     Procedures: see electronic medical records for all procedures/Xrays and details which were not copied into this note but were reviewed prior to creation of Plan. LABS:  I reviewed today's most current labs and imaging studies.   Pertinent labs include:  Recent Labs      07/04/17   0410  07/03/17   0352  07/02/17   0438   WBC  8.0  11.0  13.2*   HGB  11.5*  12.3  12.0*   HCT  34.1*  36.9  35.4*   PLT  160  149*  178     Recent Labs      07/04/17   0410  07/03/17   1745  07/03/17   0352  07/02/17   1708   07/02/17   0042 07/01/17 2202   NA  141   --   137  140   < >   --   135*   K  3.5  3.3*  3.0*  3.3*   < >   --   2.8*   CL  107   --   104  108   < >   --   101   CO2  26   --   22  23   < >   --   23   GLU  96   --   72  91   < >   --   180*   BUN  11   --   16  19   < >   --   18   CREA  0.69*   --   1.01  0.93   < >   --   1.11   CA  7.2*   --   7.3*  7.4*   < >   --   8.3*   MG  2.4   --   2.8*   --    --    --   3.2*   PHOS  1.9*   --   1.4*   --    --    --    --    ALB  2.6*   --    --    --    --    --   3.7   TBILI  0.7   --    --    --    --    --   0.7   SGOT  31   --    --    --    --    --   35   ALT  19   --    --    --    --    --   21   INR   --    --    --    --    --   1.3*   --     < > = values in this interval not displayed.        Signed: Steve Trujillo MD

## 2017-07-05 ENCOUNTER — APPOINTMENT (OUTPATIENT)
Dept: GENERAL RADIOLOGY | Age: 77
DRG: 871 | End: 2017-07-05
Attending: INTERNAL MEDICINE
Payer: MEDICARE

## 2017-07-05 LAB
ANION GAP BLD CALC-SCNC: 7 MMOL/L (ref 5–15)
BASOPHILS # BLD AUTO: 0 K/UL (ref 0–0.1)
BASOPHILS # BLD: 0 % (ref 0–1)
BUN SERPL-MCNC: 8 MG/DL (ref 6–20)
BUN/CREAT SERPL: 14 (ref 12–20)
CALCIUM SERPL-MCNC: 7.3 MG/DL (ref 8.5–10.1)
CHLORIDE SERPL-SCNC: 100 MMOL/L (ref 97–108)
CO2 SERPL-SCNC: 29 MMOL/L (ref 21–32)
CREAT SERPL-MCNC: 0.58 MG/DL (ref 0.7–1.3)
DIFFERENTIAL METHOD BLD: ABNORMAL
EOSINOPHIL # BLD: 0.1 K/UL (ref 0–0.4)
EOSINOPHIL NFR BLD: 1 % (ref 0–7)
ERYTHROCYTE [DISTWIDTH] IN BLOOD BY AUTOMATED COUNT: 13 % (ref 11.5–14.5)
GLUCOSE SERPL-MCNC: 95 MG/DL (ref 65–100)
HCT VFR BLD AUTO: 35.7 % (ref 36.6–50.3)
HGB BLD-MCNC: 11.9 G/DL (ref 12.1–17)
LYMPHOCYTES # BLD AUTO: 12 % (ref 12–49)
LYMPHOCYTES # BLD: 1 K/UL (ref 0.8–3.5)
MCH RBC QN AUTO: 32.2 PG (ref 26–34)
MCHC RBC AUTO-ENTMCNC: 33.3 G/DL (ref 30–36.5)
MCV RBC AUTO: 96.7 FL (ref 80–99)
MONOCYTES # BLD: 0.6 K/UL (ref 0–1)
MONOCYTES NFR BLD AUTO: 7 % (ref 5–13)
NEUTS SEG # BLD: 6.9 K/UL (ref 1.8–8)
NEUTS SEG NFR BLD AUTO: 80 % (ref 32–75)
PLATELET # BLD AUTO: 193 K/UL (ref 150–400)
POTASSIUM SERPL-SCNC: 3.1 MMOL/L (ref 3.5–5.1)
RBC # BLD AUTO: 3.69 M/UL (ref 4.1–5.7)
RBC MORPH BLD: ABNORMAL
SODIUM SERPL-SCNC: 136 MMOL/L (ref 136–145)
WBC # BLD AUTO: 8.6 K/UL (ref 4.1–11.1)
WBC MORPH BLD: ABNORMAL

## 2017-07-05 PROCEDURE — G8979 MOBILITY GOAL STATUS: HCPCS

## 2017-07-05 PROCEDURE — 36592 COLLECT BLOOD FROM PICC: CPT

## 2017-07-05 PROCEDURE — 74011250636 HC RX REV CODE- 250/636: Performed by: INTERNAL MEDICINE

## 2017-07-05 PROCEDURE — 74011250637 HC RX REV CODE- 250/637: Performed by: INTERNAL MEDICINE

## 2017-07-05 PROCEDURE — 36415 COLL VENOUS BLD VENIPUNCTURE: CPT | Performed by: INTERNAL MEDICINE

## 2017-07-05 PROCEDURE — 74011000258 HC RX REV CODE- 258: Performed by: INTERNAL MEDICINE

## 2017-07-05 PROCEDURE — 74011250636 HC RX REV CODE- 250/636: Performed by: NURSE PRACTITIONER

## 2017-07-05 PROCEDURE — 77010033678 HC OXYGEN DAILY

## 2017-07-05 PROCEDURE — 65660000000 HC RM CCU STEPDOWN

## 2017-07-05 PROCEDURE — 85025 COMPLETE CBC W/AUTO DIFF WBC: CPT | Performed by: INTERNAL MEDICINE

## 2017-07-05 PROCEDURE — G8978 MOBILITY CURRENT STATUS: HCPCS

## 2017-07-05 PROCEDURE — 97162 PT EVAL MOD COMPLEX 30 MIN: CPT

## 2017-07-05 PROCEDURE — 74011250637 HC RX REV CODE- 250/637: Performed by: FAMILY MEDICINE

## 2017-07-05 PROCEDURE — 80048 BASIC METABOLIC PNL TOTAL CA: CPT | Performed by: INTERNAL MEDICINE

## 2017-07-05 PROCEDURE — 97116 GAIT TRAINING THERAPY: CPT

## 2017-07-05 PROCEDURE — 74020 XR ABD (AP AND ERECT OR DECUB): CPT

## 2017-07-05 RX ORDER — AMIODARONE HYDROCHLORIDE 200 MG/1
200 TABLET ORAL 2 TIMES DAILY
Status: DISCONTINUED | OUTPATIENT
Start: 2017-07-05 | End: 2017-07-05

## 2017-07-05 RX ORDER — POTASSIUM CHLORIDE 7.45 MG/ML
10 INJECTION INTRAVENOUS
Status: COMPLETED | OUTPATIENT
Start: 2017-07-05 | End: 2017-07-05

## 2017-07-05 RX ORDER — PANTOPRAZOLE SODIUM 40 MG/1
40 TABLET, DELAYED RELEASE ORAL
Status: DISCONTINUED | OUTPATIENT
Start: 2017-07-06 | End: 2017-07-08 | Stop reason: HOSPADM

## 2017-07-05 RX ORDER — AZITHROMYCIN 250 MG/1
500 TABLET, FILM COATED ORAL ONCE
Status: COMPLETED | OUTPATIENT
Start: 2017-07-05 | End: 2017-07-05

## 2017-07-05 RX ORDER — DEXTROSE, SODIUM CHLORIDE, AND POTASSIUM CHLORIDE 5; .9; .15 G/100ML; G/100ML; G/100ML
75 INJECTION INTRAVENOUS CONTINUOUS
Status: DISCONTINUED | OUTPATIENT
Start: 2017-07-05 | End: 2017-07-06

## 2017-07-05 RX ORDER — POTASSIUM CHLORIDE 750 MG/1
40 TABLET, FILM COATED, EXTENDED RELEASE ORAL
Status: COMPLETED | OUTPATIENT
Start: 2017-07-05 | End: 2017-07-05

## 2017-07-05 RX ORDER — METOPROLOL SUCCINATE 25 MG/1
25 TABLET, EXTENDED RELEASE ORAL DAILY
Status: DISCONTINUED | OUTPATIENT
Start: 2017-07-06 | End: 2017-07-07

## 2017-07-05 RX ORDER — AZITHROMYCIN 250 MG/1
250 TABLET, FILM COATED ORAL DAILY
Status: DISCONTINUED | OUTPATIENT
Start: 2017-07-06 | End: 2017-07-07

## 2017-07-05 RX ORDER — SODIUM,POTASSIUM PHOSPHATES 280-250MG
1 POWDER IN PACKET (EA) ORAL 4 TIMES DAILY
Status: DISCONTINUED | OUTPATIENT
Start: 2017-07-05 | End: 2017-07-07

## 2017-07-05 RX ADMIN — POTASSIUM CHLORIDE 40 MEQ: 750 TABLET, FILM COATED, EXTENDED RELEASE ORAL at 11:49

## 2017-07-05 RX ADMIN — POLYETHYLENE GLYCOL 3350 17 G: 17 POWDER, FOR SOLUTION ORAL at 11:53

## 2017-07-05 RX ADMIN — GUAIFENESIN AND DEXTROMETHORPHAN 10 ML: 100; 10 SYRUP ORAL at 21:57

## 2017-07-05 RX ADMIN — AMIODARONE HYDROCHLORIDE 0.5 MG/MIN: 50 INJECTION, SOLUTION INTRAVENOUS at 19:10

## 2017-07-05 RX ADMIN — Medication 30 ML: at 21:57

## 2017-07-05 RX ADMIN — Medication 40 ML: at 03:42

## 2017-07-05 RX ADMIN — AZITHROMYCIN 500 MG: 250 TABLET, FILM COATED ORAL at 11:48

## 2017-07-05 RX ADMIN — POTASSIUM & SODIUM PHOSPHATES POWDER PACK 280-160-250 MG 1 PACKET: 280-160-250 PACK at 17:28

## 2017-07-05 RX ADMIN — Medication 10 ML: at 19:10

## 2017-07-05 RX ADMIN — GUAIFENESIN AND DEXTROMETHORPHAN 10 ML: 100; 10 SYRUP ORAL at 11:50

## 2017-07-05 RX ADMIN — Medication 10 ML: at 16:10

## 2017-07-05 RX ADMIN — POTASSIUM CHLORIDE 10 MEQ: 10 INJECTION, SOLUTION INTRAVENOUS at 12:48

## 2017-07-05 RX ADMIN — ENOXAPARIN SODIUM 40 MG: 40 INJECTION SUBCUTANEOUS at 11:53

## 2017-07-05 RX ADMIN — AMIODARONE HYDROCHLORIDE 0.5 MG/MIN: 50 INJECTION, SOLUTION INTRAVENOUS at 00:37

## 2017-07-05 RX ADMIN — POTASSIUM & SODIUM PHOSPHATES POWDER PACK 280-160-250 MG 1 PACKET: 280-160-250 PACK at 21:57

## 2017-07-05 RX ADMIN — BENZONATATE 100 MG: 100 CAPSULE ORAL at 12:51

## 2017-07-05 RX ADMIN — POTASSIUM & SODIUM PHOSPHATES POWDER PACK 280-160-250 MG 1 PACKET: 280-160-250 PACK at 11:54

## 2017-07-05 RX ADMIN — POTASSIUM CHLORIDE 10 MEQ: 10 INJECTION, SOLUTION INTRAVENOUS at 12:00

## 2017-07-05 RX ADMIN — POTASSIUM CHLORIDE 10 MEQ: 10 INJECTION, SOLUTION INTRAVENOUS at 14:00

## 2017-07-05 RX ADMIN — Medication 10 ML: at 12:51

## 2017-07-05 RX ADMIN — LORAZEPAM 0.5 MG: 2 INJECTION INTRAMUSCULAR; INTRAVENOUS at 22:52

## 2017-07-05 RX ADMIN — CEFTRIAXONE 1 G: 1 INJECTION, POWDER, FOR SOLUTION INTRAMUSCULAR; INTRAVENOUS at 11:51

## 2017-07-05 RX ADMIN — DEXTROSE MONOHYDRATE, SODIUM CHLORIDE, AND POTASSIUM CHLORIDE 75 ML/HR: 50; 9; 1.49 INJECTION, SOLUTION INTRAVENOUS at 11:49

## 2017-07-05 RX ADMIN — POTASSIUM CHLORIDE 10 MEQ: 10 INJECTION, SOLUTION INTRAVENOUS at 11:54

## 2017-07-05 NOTE — PROGRESS NOTES
1831  MEWS 3 d/t increased HR. Patient is in a-fib. Will notify MD.  Patient is asymptomatic, rest of vitals stable. Will continue to monitor closely.

## 2017-07-05 NOTE — PROGRESS NOTES
Surgery      Patient seen briefly as he was leaving to go for xray. Xray finally reported and shows\"Supine and decubitus views of the abdomen show significant improvement in degree  of colonic distention, with mild residual. Small bowel remains nondistended. There is no significant stool. There is no pneumoperitoneum. \"    Xrays not showing volvulus pattern, plan as per GI  Suggest aggressive regimen to prevent constipation.       Alexis Cyr MD, [facility]

## 2017-07-05 NOTE — PROGRESS NOTES
Gastroenterology Progress Note    7/5/2017    Admit Date: 7/1/2017    Subjective: Follow up for: colonic distension and sigmoid volvulus. No new issues. Abdomen is distended. Patient was seen in rounds by me today.   + BM. Current Facility-Administered Medications   Medication Dose Route Frequency    cefTRIAXone (ROCEPHIN) 1 g in 0.9% sodium chloride (MBP/ADV) 50 mL  1 g IntraVENous Q24H    polyethylene glycol (MIRALAX) packet 17 g  17 g Oral DAILY    enoxaparin (LOVENOX) injection 40 mg  40 mg SubCUTAneous Q24H    pantoprazole (PROTONIX) 40 mg in sodium chloride 0.9 % 10 mL injection  40 mg IntraVENous DAILY    sodium chloride (NS) flush 10-30 mL  10-30 mL InterCATHeter PRN    sodium chloride (NS) flush 10 mL  10 mL InterCATHeter Q24H    sodium chloride (NS) flush 10 mL  10 mL InterCATHeter PRN    sodium chloride (NS) flush 10-40 mL  10-40 mL InterCATHeter Q8H    sodium chloride (NS) flush 20 mL  20 mL InterCATHeter PRN    heparin (porcine) pf 300 Units  300 Units InterCATHeter PRN    LORazepam (ATIVAN) injection 0.5 mg  0.5 mg IntraVENous Q4H PRN    benzonatate (TESSALON) capsule 100 mg  100 mg Oral Q8H PRN    guaiFENesin-dextromethorphan (ROBITUSSIN DM) 100-10 mg/5 mL syrup 10 mL  10 mL Oral Q6H PRN    amiodarone (CORDARONE) 450 mg in dextrose 5% 250 mL infusion  0.5-1 mg/min IntraVENous CONTINUOUS    mupirocin (BACTROBAN) 2 % ointment   Both Nostrils BID    sodium chloride (NS) flush 5-10 mL  5-10 mL IntraVENous PRN    acetaminophen (TYLENOL) tablet 650 mg  650 mg Oral Q4H PRN    ondansetron (ZOFRAN) injection 4 mg  4 mg IntraVENous Q4H PRN    hydrALAZINE (APRESOLINE) 20 mg/mL injection 20 mg  20 mg IntraVENous Q6H PRN    zinc oxide-cod liver oil (DESITIN) 40 % paste   Topical PRN        Objective:     Blood pressure 156/76, pulse 84, temperature 97.6 °F (36.4 °C), resp. rate 18, height 6' (1.829 m), weight 88.7 kg (195 lb 8.8 oz), SpO2 97 %.          07/03 1901 - 07/05 0700  In: 838.2 [I.V.:838.2]  Out: 1105 [Urine:1105]        Physical Examination:       General:intubated  HEENT:  ETT  Chest:  CTA,   Heart: S1, S2, RRR  GI: Soft,  Distended, dec bowel sounds      Data Review    Recent Results (from the past 24 hour(s))   CBC WITH AUTOMATED DIFF    Collection Time: 07/05/17  2:41 AM   Result Value Ref Range    WBC 8.6 4.1 - 11.1 K/uL    RBC 3.69 (L) 4.10 - 5.70 M/uL    HGB 11.9 (L) 12.1 - 17.0 g/dL    HCT 35.7 (L) 36.6 - 50.3 %    MCV 96.7 80.0 - 99.0 FL    MCH 32.2 26.0 - 34.0 PG    MCHC 33.3 30.0 - 36.5 g/dL    RDW 13.0 11.5 - 14.5 %    PLATELET 923 167 - 859 K/uL    NEUTROPHILS 80 (H) 32 - 75 %    LYMPHOCYTES 12 12 - 49 %    MONOCYTES 7 5 - 13 %    EOSINOPHILS 1 0 - 7 %    BASOPHILS 0 0 - 1 %    ABS. NEUTROPHILS 6.9 1.8 - 8.0 K/UL    ABS. LYMPHOCYTES 1.0 0.8 - 3.5 K/UL    ABS. MONOCYTES 0.6 0.0 - 1.0 K/UL    ABS. EOSINOPHILS 0.1 0.0 - 0.4 K/UL    ABS.  BASOPHILS 0.0 0.0 - 0.1 K/UL    RBC COMMENTS NORMOCYTIC, NORMOCHROMIC      WBC COMMENTS REACTIVE LYMPHS      DF SMEAR SCANNED     METABOLIC PANEL, BASIC    Collection Time: 07/05/17  2:41 AM   Result Value Ref Range    Sodium 136 136 - 145 mmol/L    Potassium 3.1 (L) 3.5 - 5.1 mmol/L    Chloride 100 97 - 108 mmol/L    CO2 29 21 - 32 mmol/L    Anion gap 7 5 - 15 mmol/L    Glucose 95 65 - 100 mg/dL    BUN 8 6 - 20 MG/DL    Creatinine 0.58 (L) 0.70 - 1.30 MG/DL    BUN/Creatinine ratio 14 12 - 20      GFR est AA >60 >60 ml/min/1.73m2    GFR est non-AA >60 >60 ml/min/1.73m2    Calcium 7.3 (L) 8.5 - 10.1 MG/DL     Recent Labs      07/05/17   0241 07/04/17   0410   WBC  8.6  8.0   HGB  11.9*  11.5*   HCT  35.7*  34.1*   PLT  193  160     Recent Labs      07/05/17   0241 07/04/17   0410  07/03/17   1745  07/03/17   0352   NA  136  141   --   137   K  3.1*  3.5  3.3*  3.0*   CL  100  107   --   104   CO2  29  26   --   22   BUN  8  11   --   16   CREA  0.58*  0.69*   --   1.01   GLU  95  96   --   72   CA  7.3*  7.2*   --   7.3*   MG --   2.4   --   2.8*   PHOS   --   1.9*   --   1.4*     Recent Labs      07/04/17   0410   SGOT  31   AP  54   TP  5.8*   ALB  2.6*   GLOB  3.2     No results for input(s): INR, PTP, APTT in the last 72 hours. No lab exists for component: INREXT, INREXT   No results for input(s): FE, TIBC, PSAT, FERR in the last 72 hours. No results found for: FOL, RBCF   Recent Labs      07/03/17   1228  07/03/17   0417   PH  7.45  7.42   PCO2  34*  35   PO2  62*  97     Recent Labs      07/03/17   0352   CPK  225   CKNDX  0.8   TROIQ  0.10*     No results found for: CHOL, CHOLX, CHLST, CHOLV, HDL, LDL, LDLC, DLDLP, TGLX, TRIGL, TRIGP, CHHD, CHHDX  No components found for: [place]  Lab Results   Component Value Date/Time    Color YELLOW/STRAW 07/02/2017 04:38 AM    Appearance CLEAR 07/02/2017 04:38 AM    Specific gravity 1.020 07/02/2017 04:38 AM    Specific gravity 1.022 06/30/2017 10:32 PM    pH (UA) 6.0 07/02/2017 04:38 AM    Protein 100 07/02/2017 04:38 AM    Glucose NEGATIVE  07/02/2017 04:38 AM    Ketone TRACE 07/02/2017 04:38 AM    Bilirubin NEGATIVE  11/05/2016 01:30 PM    Urobilinogen 1.0 07/02/2017 04:38 AM    Nitrites NEGATIVE  07/02/2017 04:38 AM    Leukocyte Esterase NEGATIVE  07/02/2017 04:38 AM    Epithelial cells FEW 07/02/2017 04:38 AM    Bacteria NEGATIVE  07/02/2017 04:38 AM    WBC 0-4 07/02/2017 04:38 AM    RBC 10-20 07/02/2017 04:38 AM        ROS: -CP, SOB, Dysuria, palpitations, cough. Assessment:    Colonic distension,  Sigmoid volvulus,  Hypokalemia    Active Problems:    Respiratory failure ([address] (7/2/2017)             Plan/Discussion:     1. Aggressive K+ replacement. KUB (7/4/17) shows that the colon is significantly  dilated and unchanged. The largest portion of the bowel appears to point from  the left lower quadrant into the right upper quadrant which is suggestive of  sigmoid volvulus  2. Replace phos,  3. Repeat KUB today.  If KUB still suggestive of volvulus pattern surgery to decide on further treatment. Signed By: Serjio Langford.  Julio Courser, MD    7/5/2017  11:01 AM

## 2017-07-05 NOTE — PROGRESS NOTES
Bedside shift change report given to Syed Chung RN (oncoming nurse) by Angela Billy (offgoing nurse). Report included the following information SBAR.

## 2017-07-05 NOTE — PROGRESS NOTES
1845  Patient converted back into a-fib. HR ranging from 120's - 140's. Dr. Richar Figueroa notified. New orders received. No PO amiodarone and start amiodarone drip back at 0.5 mg/min. Pharmacy notified, order placed and sending new bag to floor. Patient stable and asymptomatic.

## 2017-07-05 NOTE — PROGRESS NOTES
Hospitalist Progress Note    NAME: Danuta First   :  1940   MRN:  [id number]       Assessment / Plan:  Severe sepsis POA  Acute respiratory failure with hypoxia POA requiring intubation, now extubated  RLL community acquired pneumonia POA  E coli UTI POA pan sensitive  Leukocytosis, tachycardic, lactate 2.4  PCXR yesterday with increasing   IV ceftriaxone and azithromycin  Blood cultures ntd, urine culture showed Ecoli on 17  Weaned off O2    Sigmoid volvulus with proximal colonic obstruction, severe distention POA  S/P emergent colonic decompression tube 7/3, now out  No signs of perforation or ischemia on CT  Clinically improved, KUB yesterday still with persistent volvulous  Repeat KUB today, ? Advance diet if stable  GI/gen surg following    Afib with RVR  remains on amiodarone gtt  Appears to be in NSR now, wean cardizem  Cardiology following    Hypokalemia POA  Hypophosphatemia POA  Aggressive repletion with KCl  monitor bmp, mag    Hypertension POA  Holding home meds for now   Hydralazine PRN     Unspecified kidney disease  Family unclear of exact diagnosis but know that patient has been taking prednisone for the last year and is currently being tapered to 10 mg daily     Body mass index is 26.52 kg/(m^2). Code status: Full  Prophylaxis: Lovenox  Recommended Disposition: Home w/Family     Subjective:     Chief Complaint / Reason for Physician Visit  Pt seen at bedside, Discussed with RN events overnight.    \"I am having some diarrhea\"  No abdominal pain, remains NPO  Now out of ICU  Frequent cough    Review of Systems:  Symptom Y/N Comments  Symptom Y/N Comments   Fever/Chills n   Chest Pain n    Poor Appetite    Edema     Cough y   Abdominal Pain n    Sputum    Joint Pain     SOB/ANDERSON n   Pruritis/Rash     Nausea/vomit n   Tolerating PT/OT     Diarrhea    Tolerating Diet n    Constipation    Other       Could NOT obtain due to:      Objective:     VITALS:   Last 24hrs VS reviewed since prior progress note. Most recent are:  Patient Vitals for the past 24 hrs:   Temp Pulse Resp BP SpO2   07/05/17 0728 97.5 °F (36.4 °C) 85 20 165/79 98 %   07/05/17 0312 97.6 °F (36.4 °C) 84 18 156/76 97 %   07/05/17 0259 - - - - 92 %   07/05/17 0100 - - (!) 32 - 93 %   07/04/17 2259 97.4 °F (36.3 °C) 78 24 172/82 96 %   07/04/17 1930 97.8 °F (36.6 °C) 80 17 155/70 93 %   07/04/17 1553 97.3 °F (36.3 °C) 85 18 176/78 92 %       Intake/Output Summary (Last 24 hours) at 07/05/17 1021  Last data filed at 07/05/17 5516   Gross per 24 hour   Intake                0 ml   Output              500 ml   Net             -500 ml        PHYSICAL EXAM:  General: WD, WN. Alert, cooperative, no acute distress, coughing  EENT:  Dry oral mucosa, no adenopathy  Resp:  Rhonchi bilaterally, no wheezing or rales. No accessory muscle use  CV:  irregular  rhythm,  No edema  GI:  Soft, Non distended, not tender  Neurologic:  Awake, alert, follows commands  Psych:   Not agitated or anxious  Skin:  No rashes. No jaundice    Reviewed most current lab test results and cultures  YES  Reviewed most current radiology test results   YES  Review and summation of old records today    NO  Reviewed patient's current orders and MAR    YES  PMH/ reviewed - no change compared to H&P  ________________________________________________________________________  Care Plan discussed with:    Comments   Patient x    Family   Wife at bedside   RN x    Care Manager     Consultant                        Multidiciplinary team rounds were held today with , nursing, pharmacist and clinical coordinator. Patient's plan of care was discussed; medications were reviewed and discharge planning was addressed.      ________________________________________________________________________  Total NON critical care TIME:  25   Minutes    Total CRITICAL CARE TIME Spent:   Minutes non procedure based      Comments   >50% of visit spent in counseling and coordination of care ________________________________________________________________________  Karen Ryder MD     Procedures: see electronic medical records for all procedures/Xrays and details which were not copied into this note but were reviewed prior to creation of Plan. LABS:  I reviewed today's most current labs and imaging studies.   Pertinent labs include:  Recent Labs      07/05/17   0241 07/04/17 0410 07/03/17   0352   WBC  8.6  8.0  11.0   HGB  11.9*  11.5*  12.3   HCT  35.7*  34.1*  36.9   PLT  193  160  149*     Recent Labs      07/05/17   0241  07/04/17 0410  07/03/17   1745  07/03/17 0352   NA  136  141   --   137   K  3.1*  3.5  3.3*  3.0*   CL  100  107   --   104   CO2  29  26   --   22   GLU  95  96   --   72   BUN  8  11   --   16   CREA  0.58*  0.69*   --   1.01   CA  7.3*  7.2*   --   7.3*   MG   --   2.4   --   2.8*   PHOS   --   1.9*   --   1.4*   ALB   --   2.6*   --    --    TBILI   --   0.7   --    --    SGOT   --   31   --    --    ALT   --   19   --    --        Signed: Karen Ryder MD

## 2017-07-05 NOTE — PROGRESS NOTES
Cardiology Progress Note  7/5/2017    Admit Date: 7/1/2017  Admit Diagnosis: Respiratory failure ([facility] [address]  colon decompression    Usual cardiologist:  Jesus Silver MD     Assessment: 1. Paroxysmal atrial fibrillation. RVR requiring rate control with diltiazem. Not on OP anticoagulation. 2. History of paroxysmal SVT converted with adenosine in 11/2016. 3. Chronic left bundle branch block. 4. 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 (Placed by Dr. Sheryle Bohr with our practice). 5. Severe colonic distension with sigmoid volvulus, improving. S/p decompression. 6. Acute respiratory failure. Intubated. 7. Hypokalemia. 8. Chronic ASA therapy.     Plan:      1. Rate control for now. Rather than use diltiazem which can cause constipation at higher dose, will use metoprolol succinate. 2. Continue low dose amiodarone, change to oral 200 bid. 3. Not an immediate anticoagulation candidate. 4. Will follow-up on pacemaker data from the past to see what the burden of Afib has been before (though that won't change immediate plan). He definitely had an adenosine-sensitive SVT in 2016.         For other plans, see orders.   High complexity decision making was performed  X Yes   High-risk of decompensation with multiple organ involvement X Yes   Hospital problem list   Active Hospital Problems    Diagnosis Date Noted    Respiratory failure ([facility] 07/02/2017                                                         Subjective:  Patient reports  []   nothing; unable to communicate    []   intubated   Chest pain X none  consistent with:  Non-cardiac CP         Atypical CP     None now  On going  Anginal CP     Dyspnea X none  at rest  with exertion         improved  unchanged  worse              PND X none  overnight       Orthopnea X none  improved  unchanged  worse   Presyncope X none  improved  unchanged  worse   Ambulated in hallway without symptoms   Yes   Ambulated in room without symptoms  Yes     ROS Hematuria:  Yes X No Dysuria:  Yes X No                (2+  other systems) Cough: Yes X No Sputum: Yes X No                 BRBPR:  Yes X No Melena: Yes X No   No change in family and social history from H&P/Consult note.   Objective:    Physical Exam:  24 hr VS reviewed, overall VSSAF  Temp (24hrs), Av.5 °F (36.4 °C), Min:97.3 °F (36.3 °C), Max:97.8 °F (36.6 °C)    Patient Vitals for the past 8 hrs:   Pulse   17 1515 82   17 1146 80    Patient Vitals for the past 8 hrs:   Resp   17 1515 20   17 1146 18    Patient Vitals for the past 8 hrs:   BP   17 1515 144/71   17 1146 155/74          Intake/Output Summary (Last 24 hours) at 17 1803  Last data filed at 17 3189   Gross per 24 hour   Intake                0 ml   Output              350 ml   Net             -350 ml     General: x WD,WN  Elderly  Cachetic x NAD     Agitated  Lethargic  Arousable  Obtunded     Sedated  On Bipap  Intubated                ENT/Palate: X WNL  Dry MM  anicteric                Respiratory:  CTA  Nl resp effort  Increased effort  No significant change    x scattered rhonchi  rales  improved  worse              Cardiovasc: X RRR  IRRR X Nl S1, S2 x No rub     No murmur X No new murmur  Murmur c/w: x No gallop    x No edema  BLE edema:+  RLE edema:+  LLE edema:+     Edema less  Edema more  Edema same  Edema worse    X Nl JVP  Elevated JVP  JVP same  JVP worse    X Carotid wnl x abd aorta not palpated X no peripheral emboli noted                GI: X abd soft x nondistended X BS present X No organo- megaly noted              Skin: X Warm, dry  Cold extremites                  Neuro: x A/O x Grossly non- focal  Obtunded  Sedated     Lethargic  Arousable  intubated       cath site intact w/o hematoma or new bruit; distal pulse unchanged  Yes   Data Review:     Telemetry independently reviewed x sinus x V paced  parox afib  NSVT     ECG independently reviewed  NSR  afib  no significant changes  NSST-Tw chgs   x no new ECG provided for review   Lab results reviewed as noted below. Current medications reviewed as noted below. Recent Labs      07/03/17   1228  07/03/17   0417   PH  7.45  7.42   PCO2  34*  35   PO2  62*  97     Recent Labs      07/03/17   0352   CPK  225   CKMB  1.8     Recent Labs      07/05/17   0241  07/04/17   0410  07/03/17   1745  07/03/17   0352   NA  136  141   --   137   K  3.1*  3.5  3.3*  3.0*   CL  100  107   --   104   CO2  29  26   --   22   BUN  8  11   --   16   CREA  0.58*  0.69*   --   1.01   GLU  95  96   --   72   PHOS   --   1.9*   --   1.4*   CA  7.3*  7.2*   --   7.3*   ALB   --   2.6*   --    --    WBC  8.6  8.0   --   11.0   HGB  11.9*  11.5*   --   12.3   HCT  35.7*  34.1*   --   36.9   PLT  193  160   --   149*     Recent Labs      07/04/17   0410   SGOT  31   AP  54   TBILI  0.7   TP  5.8*   ALB  2.6*   GLOB  3.2     No results for input(s): INR, PTP, APTT in the last 72 hours. No lab exists for component: INREXT, INREXT   No results for input(s): FE, TIBC, PSAT, FERR in the last 72 hours.    No results found for: GLUCPOC    Current Facility-Administered Medications   Medication Dose Route Frequency    [START ON 7/6/2017] pantoprazole (PROTONIX) tablet 40 mg  40 mg Oral ACB    potassium, sodium phosphates (NEUTRA-PHOS) packet 1 Packet  1 Packet Oral QID    [START ON 7/6/2017] azithromycin (ZITHROMAX) tablet 250 mg  250 mg Oral DAILY    dextrose 5% - 0.9% NaCl with KCl 20 mEq/L infusion  75 mL/hr IntraVENous CONTINUOUS    [START ON 7/6/2017] metoprolol succinate (TOPROL-XL) XL tablet 25 mg  25 mg Oral DAILY    cefTRIAXone (ROCEPHIN) 1 g in 0.9% sodium chloride (MBP/ADV) 50 mL  1 g IntraVENous Q24H    polyethylene glycol (MIRALAX) packet 17 g  17 g Oral DAILY    enoxaparin (LOVENOX) injection 40 mg  40 mg SubCUTAneous Q24H    sodium chloride (NS) flush 10-30 mL  10-30 mL InterCATHeter PRN    sodium chloride (NS) flush 10 mL  10 mL InterCATHeter Q24H    sodium chloride (NS) flush 10 mL  10 mL InterCATHeter PRN    sodium chloride (NS) flush 10-40 mL  10-40 mL InterCATHeter Q8H    sodium chloride (NS) flush 20 mL  20 mL InterCATHeter PRN    heparin (porcine) pf 300 Units  300 Units InterCATHeter PRN    LORazepam (ATIVAN) injection 0.5 mg  0.5 mg IntraVENous Q4H PRN    benzonatate (TESSALON) capsule 100 mg  100 mg Oral Q8H PRN    guaiFENesin-dextromethorphan (ROBITUSSIN DM) 100-10 mg/5 mL syrup 10 mL  10 mL Oral Q6H PRN    mupirocin (BACTROBAN) 2 % ointment   Both Nostrils BID    sodium chloride (NS) flush 5-10 mL  5-10 mL IntraVENous PRN    acetaminophen (TYLENOL) tablet 650 mg  650 mg Oral Q4H PRN    ondansetron (ZOFRAN) injection 4 mg  4 mg IntraVENous Q4H PRN    hydrALAZINE (APRESOLINE) 20 mg/mL injection 20 mg  20 mg IntraVENous Q6H PRN    zinc oxide-cod liver oil (DESITIN) 40 % paste   Topical PRN         Xiomara Ahmadi MD

## 2017-07-05 NOTE — PROGRESS NOTES
Problem: Mobility Impaired (Adult and Pediatric)  Goal: *Acute Goals and Plan of Care (Insert Text)  Physical Therapy Goals  Initiated 7/5/2017  1. Patient will move from supine to sit and sit to supine in bed with independence within 7 day(s). 2. Patient will transfer from bed to chair and chair to bed with modified independence using the least restrictive device within 7 day(s). 3. Patient will perform sit to stand with modified independence within 7 day(s). 4. Patient will ambulate with modified independence for 120 feet with the least restrictive device within 7 day(s). 5. Patient will ascend/descend 4 stairs with 1 handrail(s) with modified independence within 7 day(s). PHYSICAL THERAPY EVALUATION  Patient: Nicole Pryor (10 y.o. male)  Date: 7/5/2017  Primary Diagnosis: Respiratory failure (HCC)  colon decompression  Procedure(s) (LRB):  COLONOSCOPY ER 21 (N/A)  COLON DECOMPRESSION (N/A) 3 Days Post-Op   Precautions:   Fall      ASSESSMENT :  Based on the objective data described below, the patient presents with generalized weakness, decreased activity tolerance, increased productive cough, impaired balance and altered gait. PTA pt was living at home with wife and not using any AD, denied any falls. He was received sitting up in the chair on 3L of supplemental oxygen. On RA at rest oxygen 97%. With activity oxygen dropped to 92%, nursing ware and ok with leaving pt on RA. Sit<>stand was performed with CGA. Ambulated 50ft without AD, noted slowed shuffling steps. Pt continually reaching out for support with UEs. Provided a RW back to the room with CGA. Wife entered the room at the end of the session asking multiple questions. He was able to perform bathroom ADLs with supervision. He was left sitting up in the chair. Recommending SNF at discharge. Wife likely in agreement, but pt likely to refuse. At the least pt needs HHPT with 24/7 and RW. Wife interested in [facility], pt likely too high level.      Patient will benefit from skilled intervention to address the above impairments. Patients rehabilitation potential is considered to be Good  Factors which may influence rehabilitation potential include:   [ ]         None noted  [ ]         Mental ability/status  [ ]         Medical condition  [ ]         Home/family situation and support systems  [X]         Safety awareness  [ ]         Pain tolerance/management  [ ]         Other:        PLAN :  Recommendations and Planned Interventions:  [X]           Bed Mobility Training             [ ]    Neuromuscular Re-Education  [X]           Transfer Training                   [ ]    Orthotic/Prosthetic Training  [X]           Gait Training                         [ ]    Modalities  [X]           Therapeutic Exercises           [ ]    Edema Management/Control  [X]           Therapeutic Activities            [X]    Patient and Family Training/Education  [ ]           Other (comment):     Frequency/Duration: Patient will be followed by physical therapy  5 times a week to address goals. Discharge Recommendations: areli Sandra refuses HHPT with 24/7  Further Equipment Recommendations for Discharge: rolling walker       SUBJECTIVE:   Patient stated I need to sit now.       OBJECTIVE DATA SUMMARY:   HISTORY:    Past Medical History:   Diagnosis Date    Chronic kidney disease      Pacemaker 2013     Past Surgical History:   Procedure Laterality Date    COLONOSCOPY N/A 7/2/2017     COLONOSCOPY ER 21 performed by [facility] at [facility] ENDOSCOPY    HX ORTHOPAEDIC         hip replacement, knee, back     Prior Level of Function/Home Situation: pt was not using any AD PTA, lives with wife and denies any falls. Wife concerned about being able to take care of him at home and stated that she thinks her insurance will cover 24/7 care if needed.    Personal factors and/or comorbidities impacting plan of care:      Home Situation  Home Environment: Private residence  # Steps to Enter: 4  Rails to Enter: Yes  Hand Rails : Bilateral  One/Two Story Residence: One story  Living Alone: No  Support Systems: Spouse/Significant Other/Partner  Patient Expects to be Discharged to[de-identified] Unknown  Current DME Used/Available at Home: None, Cane, straight  Tub or Shower Type: Shower     EXAMINATION/PRESENTATION/DECISION MAKING:   Critical Behavior:  Neurologic State: Eyes open spontaneously  Orientation Level: Oriented X4  Cognition: Follows commands     Hearing: Auditory  Auditory Impairment: None  Skin:  WDL  Edema:   Range Of Motion:  AROM: Within functional limits           PROM: Within functional limits           Strength:    Strength: Generally decreased, functional                    Tone & Sensation:   Tone: Normal              Sensation: Intact               Coordination:  Coordination: Within functional limits  Vision:      Functional Mobility:  Bed Mobility:   session began and ended in sitting           Transfers:  Sit to Stand: Contact guard assistance  Stand to Sit: Contact guard assistance                       Balance:   Sitting: Intact  Standing: Impaired  Standing - Static: Good;Constant support  Standing - Dynamic : Fair  Ambulation/Gait Training:  Distance (ft): 100 Feet (ft)  Assistive Device: Gait belt;Walker, rolling  Ambulation - Level of Assistance: Contact guard assistance        Gait Abnormalities: Decreased step clearance;Shuffling gait        Base of Support: Widened     Speed/Hoda: Pace decreased (<100 feet/min)  Step Length: Left shortened;Right shortened                 Therapeutic Exercises:    Ankle pumps, LAQ, marches -hand out provided     Functional Measure:  Barthel Index:      Bathin  Bladder: 5  Bowels: 5  Groomin  Dressin  Feeding: 10  Mobility: 5  Stairs: 0  Toilet Use: 10  Transfer (Bed to Chair and Back): 10  Total: 55         Barthel and G-code impairment scale:  Percentage of impairment CH  0% CI  1-19% CJ  20-39% CK  40-59% CL  60-79% CM  80-99% CN  100%   Barthel Score 0-100 100 99-80 79-60 59-40 20-39 1-19    0   Barthel Score 0-20 20 17-19 13-16 9-12 5-8 1-4 0      The Barthel ADL Index: Guidelines  1. The index should be used as a record of what a patient does, not as a record of what a patient could do. 2. The main aim is to establish degree of independence from any help, physical or verbal, however minor and for whatever reason. 3. The need for supervision renders the patient not independent. 4. A patient's performance should be established using the best available evidence. Asking the patient, friends/relatives and nurses are the usual sources, but direct observation and common sense are also important. However direct testing is not needed. 5. Usually the patient's performance over the preceding 24-48 hours is important, but occasionally longer periods will be relevant. 6. Middle categories imply that the patient supplies over 50 per cent of the effort. 7. Use of aids to be independent is allowed. Markie Holland., Barthel, DUvaldoW. (2109). Functional evaluation: the Barthel Index. [address] [facility] (14)2. Hakan Bennett renée ALFONSO Howard, Jacky Potter., Monique Lawson., Alfonzo, [address] [place] [address] (1999). Measuring the change indisability after inpatient rehabilitation; comparison of the responsiveness of the Barthel Index and Functional [place] Measure. Journal of Neurology, Neurosurgery, and Psychiatry, 66(4), 290-506. Primo Higgins, N.J.A, EBONIE Velasco, & Ronit Jerez MUvaldoA. (2004.) Assessment of post-stroke quality of life in cost-effectiveness studies: The usefulness of the Barthel Index and the EuroQoL-5D. Quality of Life Research, 13, 464-52            G codes: In compliance with CMSs Claims Based Outcome Reporting, the following G-code set was chosen for this patient based on their primary functional limitation being treated:      The outcome measure chosen to determine the severity of the functional limitation was the barthel with a score of 55/100 which was correlated with the impairment scale. · Mobility - Walking and Moving Around:               - CURRENT STATUS:    CK - 40%-59% impaired, limited or restricted               - GOAL STATUS:           CJ - 20%-39% impaired, limited or restricted               - D/C STATUS:                       ---------------To be determined---------------      Physical Therapy Evaluation Charge Determination   History Examination Presentation Decision-Making   MEDIUM  Complexity : 1-2 comorbidities / personal factors will impact the outcome/ POC  MEDIUM Complexity : 3 Standardized tests and measures addressing body structure, function, activity limitation and / or participation in recreation  MEDIUM Complexity : Evolving with changing characteristics  MEDIUM Complexity : FOTO score of 26-74      Based on the above components, the patient evaluation is determined to be of the following complexity level: MEDIUM     Pain:  Pain Scale 1: Numeric (0 - 10)  Pain Intensity 1: 0              Activity Tolerance:   VSS during session >90% on RA  Please refer to the flowsheet for vital signs taken during this treatment. After treatment:   [X]         Patient left in no apparent distress sitting up in chair  [ ]         Patient left in no apparent distress in bed  [X]         Call bell left within reach  [X]         Nursing notified  [ ]         Caregiver present  [X]         Bed alarm activated      COMMUNICATION/EDUCATION:   The patients plan of care was discussed with: Registered Nurse.  [X]         Fall prevention education was provided and the patient/caregiver indicated understanding. [ ]         Patient/family have participated as able in goal setting and plan of care. [X]         Patient/family agree to work toward stated goals and plan of care. [ ]         Patient understands intent and goals of therapy, but is neutral about his/her participation.   [ ]         Patient is unable to participate in goal setting and plan of care.      Thank you for this referral.  Napoleon Kraus, PT, DPT   Time Calculation: 31 mins

## 2017-07-05 NOTE — PROGRESS NOTES
This is a 78 yr old male who has been admitted and treated for sigmoid volvulus S/P emergent endoscopic decompression. Prior to illness patient reports independnce and lives with his spouse. Currently patient is requiring 3L of oxygen that he is not on at home. A physical therapy consult is pending for today. Patient is requesting to go home today. Will await therapy recommendations. Care Management Interventions  PCP Verified by CM: Yes  MyChart Signup: No  Discharge Durable Medical Equipment: No  Physical Therapy Consult: Yes  Occupational Therapy Consult: No  Speech Therapy Consult: No  Current Support Network: Lives with Spouse  Confirm Follow Up Transport: Family  Plan discussed with Pt/Family/Caregiver: Yes  Freedom of Choice Offered:  Yes

## 2017-07-06 ENCOUNTER — HOME HEALTH ADMISSION (OUTPATIENT)
Dept: HOME HEALTH SERVICES | Facility: HOME HEALTH | Age: 77
End: 2017-07-06
Payer: MEDICARE

## 2017-07-06 LAB
ANION GAP BLD CALC-SCNC: 8 MMOL/L (ref 5–15)
BACTERIA SPEC CULT: NORMAL
BASOPHILS # BLD AUTO: 0 K/UL (ref 0–0.1)
BASOPHILS # BLD: 0 % (ref 0–1)
BUN SERPL-MCNC: 6 MG/DL (ref 6–20)
BUN/CREAT SERPL: 9 (ref 12–20)
CALCIUM SERPL-MCNC: 7.6 MG/DL (ref 8.5–10.1)
CHLORIDE SERPL-SCNC: 101 MMOL/L (ref 97–108)
CO2 SERPL-SCNC: 26 MMOL/L (ref 21–32)
CREAT SERPL-MCNC: 0.64 MG/DL (ref 0.7–1.3)
EOSINOPHIL # BLD: 0.2 K/UL (ref 0–0.4)
EOSINOPHIL NFR BLD: 3 % (ref 0–7)
ERYTHROCYTE [DISTWIDTH] IN BLOOD BY AUTOMATED COUNT: 13 % (ref 11.5–14.5)
GLUCOSE SERPL-MCNC: 91 MG/DL (ref 65–100)
HCT VFR BLD AUTO: 36.4 % (ref 36.6–50.3)
HGB BLD-MCNC: 12.2 G/DL (ref 12.1–17)
LYMPHOCYTES # BLD AUTO: 24 % (ref 12–49)
LYMPHOCYTES # BLD: 2.2 K/UL (ref 0.8–3.5)
MCH RBC QN AUTO: 32.4 PG (ref 26–34)
MCHC RBC AUTO-ENTMCNC: 33.5 G/DL (ref 30–36.5)
MCV RBC AUTO: 96.6 FL (ref 80–99)
MONOCYTES # BLD: 1 K/UL (ref 0–1)
MONOCYTES NFR BLD AUTO: 11 % (ref 5–13)
NEUTS SEG # BLD: 5.8 K/UL (ref 1.8–8)
NEUTS SEG NFR BLD AUTO: 62 % (ref 32–75)
PHOSPHATE SERPL-MCNC: 2.8 MG/DL (ref 2.6–4.7)
PLATELET # BLD AUTO: 226 K/UL (ref 150–400)
POTASSIUM SERPL-SCNC: 3.1 MMOL/L (ref 3.5–5.1)
RBC # BLD AUTO: 3.77 M/UL (ref 4.1–5.7)
SERVICE CMNT-IMP: NORMAL
SODIUM SERPL-SCNC: 135 MMOL/L (ref 136–145)
WBC # BLD AUTO: 9.3 K/UL (ref 4.1–11.1)

## 2017-07-06 PROCEDURE — 74011250636 HC RX REV CODE- 250/636: Performed by: INTERNAL MEDICINE

## 2017-07-06 PROCEDURE — 74011250637 HC RX REV CODE- 250/637: Performed by: INTERNAL MEDICINE

## 2017-07-06 PROCEDURE — 74011250637 HC RX REV CODE- 250/637: Performed by: EMERGENCY MEDICINE

## 2017-07-06 PROCEDURE — 85025 COMPLETE CBC W/AUTO DIFF WBC: CPT | Performed by: INTERNAL MEDICINE

## 2017-07-06 PROCEDURE — 74011250637 HC RX REV CODE- 250/637: Performed by: FAMILY MEDICINE

## 2017-07-06 PROCEDURE — 80048 BASIC METABOLIC PNL TOTAL CA: CPT | Performed by: INTERNAL MEDICINE

## 2017-07-06 PROCEDURE — 74011000258 HC RX REV CODE- 258: Performed by: INTERNAL MEDICINE

## 2017-07-06 PROCEDURE — 97116 GAIT TRAINING THERAPY: CPT

## 2017-07-06 PROCEDURE — 36415 COLL VENOUS BLD VENIPUNCTURE: CPT | Performed by: INTERNAL MEDICINE

## 2017-07-06 PROCEDURE — 84100 ASSAY OF PHOSPHORUS: CPT | Performed by: INTERNAL MEDICINE

## 2017-07-06 PROCEDURE — 65660000000 HC RM CCU STEPDOWN

## 2017-07-06 RX ORDER — POTASSIUM CHLORIDE 750 MG/1
40 TABLET, FILM COATED, EXTENDED RELEASE ORAL EVERY 6 HOURS
Status: COMPLETED | OUTPATIENT
Start: 2017-07-06 | End: 2017-07-06

## 2017-07-06 RX ORDER — GUAIFENESIN 100 MG/5ML
81 LIQUID (ML) ORAL DAILY
Status: DISCONTINUED | OUTPATIENT
Start: 2017-07-06 | End: 2017-07-08 | Stop reason: HOSPADM

## 2017-07-06 RX ADMIN — LORAZEPAM 0.5 MG: 2 INJECTION INTRAMUSCULAR; INTRAVENOUS at 12:23

## 2017-07-06 RX ADMIN — CEFTRIAXONE 1 G: 1 INJECTION, POWDER, FOR SOLUTION INTRAMUSCULAR; INTRAVENOUS at 08:24

## 2017-07-06 RX ADMIN — POTASSIUM CHLORIDE 40 MEQ: 750 TABLET, FILM COATED, EXTENDED RELEASE ORAL at 19:43

## 2017-07-06 RX ADMIN — Medication 10 ML: at 15:04

## 2017-07-06 RX ADMIN — POTASSIUM CHLORIDE 40 MEQ: 750 TABLET, FILM COATED, EXTENDED RELEASE ORAL at 12:22

## 2017-07-06 RX ADMIN — GUAIFENESIN AND DEXTROMETHORPHAN 10 ML: 100; 10 SYRUP ORAL at 21:10

## 2017-07-06 RX ADMIN — PANTOPRAZOLE SODIUM 40 MG: 40 TABLET, DELAYED RELEASE ORAL at 08:18

## 2017-07-06 RX ADMIN — POTASSIUM & SODIUM PHOSPHATES POWDER PACK 280-160-250 MG 1 PACKET: 280-160-250 PACK at 23:03

## 2017-07-06 RX ADMIN — POTASSIUM CHLORIDE 40 MEQ: 750 TABLET, FILM COATED, EXTENDED RELEASE ORAL at 08:25

## 2017-07-06 RX ADMIN — MUPIROCIN: 20 OINTMENT TOPICAL at 19:45

## 2017-07-06 RX ADMIN — Medication 30 ML: at 21:10

## 2017-07-06 RX ADMIN — Medication 30 ML: at 05:41

## 2017-07-06 RX ADMIN — MUPIROCIN: 20 OINTMENT TOPICAL at 08:37

## 2017-07-06 RX ADMIN — Medication 10 ML: at 12:33

## 2017-07-06 RX ADMIN — GUAIFENESIN AND DEXTROMETHORPHAN 10 ML: 100; 10 SYRUP ORAL at 11:23

## 2017-07-06 RX ADMIN — AZITHROMYCIN 250 MG: 250 TABLET, FILM COATED ORAL at 08:24

## 2017-07-06 RX ADMIN — METOPROLOL SUCCINATE 25 MG: 25 TABLET, EXTENDED RELEASE ORAL at 08:53

## 2017-07-06 RX ADMIN — LORAZEPAM 0.5 MG: 2 INJECTION INTRAMUSCULAR; INTRAVENOUS at 02:33

## 2017-07-06 RX ADMIN — ASPIRIN 81 MG 81 MG: 81 TABLET ORAL at 11:20

## 2017-07-06 RX ADMIN — POTASSIUM & SODIUM PHOSPHATES POWDER PACK 280-160-250 MG 1 PACKET: 280-160-250 PACK at 19:43

## 2017-07-06 RX ADMIN — AMIODARONE HYDROCHLORIDE 0.5 MG/MIN: 50 INJECTION, SOLUTION INTRAVENOUS at 09:30

## 2017-07-06 RX ADMIN — POLYETHYLENE GLYCOL 3350 17 G: 17 POWDER, FOR SOLUTION ORAL at 08:25

## 2017-07-06 RX ADMIN — POTASSIUM & SODIUM PHOSPHATES POWDER PACK 280-160-250 MG 1 PACKET: 280-160-250 PACK at 08:25

## 2017-07-06 RX ADMIN — POTASSIUM & SODIUM PHOSPHATES POWDER PACK 280-160-250 MG 1 PACKET: 280-160-250 PACK at 12:22

## 2017-07-06 RX ADMIN — BENZONATATE 100 MG: 100 CAPSULE ORAL at 02:29

## 2017-07-06 RX ADMIN — ENOXAPARIN SODIUM 40 MG: 40 INJECTION SUBCUTANEOUS at 11:20

## 2017-07-06 NOTE — PROGRESS NOTES
Bedside shift change report given to Stephanie Solares RN (oncoming nurse) by Jose M Aiken RN (offgoing nurse). Report included the following information SBAR.

## 2017-07-06 NOTE — PROGRESS NOTES
Problem: Mobility Impaired (Adult and Pediatric)  Goal: *Acute Goals and Plan of Care (Insert Text)  Physical Therapy Goals  Initiated 7/5/2017  1. Patient will move from supine to sit and sit to supine in bed with independence within 7 day(s). 2. Patient will transfer from bed to chair and chair to bed with modified independence using the least restrictive device within 7 day(s). 3. Patient will perform sit to stand with modified independence within 7 day(s). 4. Patient will ambulate with modified independence for 120 feet with the least restrictive device within 7 day(s). 5. Patient will ascend/descend 4 stairs with 1 handrail(s) with modified independence within 7 day(s). PHYSICAL THERAPY TREATMENT  Patient: Anna Real (89 y.o. male)  Date: 7/6/2017  Diagnosis: Respiratory failure (HCC)     Procedure(s) (LRB):  COLONOSCOPY ER 21 (N/A)  COLON DECOMPRESSION (N/A) 4 Days Post-Op      Precautions: Fall  Chart, physical therapy assessment, plan of care and goals were reviewed. ASSESSMENT: pt tolerated tx well, no LOB or SOB (on room air), good motivation, did well with transfers, has had stair trng when he had a hip replaced and should have no trouble on stairs, vc's for safety and proper RW use. Progression toward goals:  [X]      Improving appropriately and progressing toward goals  [ ]      Improving slowly and progressing toward goals  [ ]      Not making progress toward goals and plan of care will be adjusted       PLAN:  Patient continues to benefit from skilled intervention to address the above impairments. Continue treatment per established plan of care.   Discharge Recommendations:  Home Health  Further Equipment Recommendations for Discharge:  rolling walker       OBJECTIVE DATA SUMMARY:      Critical Behavior:  Neurologic State: Eyes open spontaneously  Orientation Level: Oriented X4  Cognition: Follows commands     Functional Mobility Training:  Bed Mobility: pt sitting in chair on arrival     Transfers:  Sit to Stand: Contact guard assistance  Stand to Sit: Contact guard assistance  Interventions: Tactile cues; Verbal cues  Level of Assistance: Contact guard assistance      Balance:  Sitting: Intact; Without support  Standing: Intact; With support  Standing - Static: Good;Constant support  Standing - Dynamic : Good      Ambulation/Gait Training:  Distance (ft): 200 Feet (ft)  Assistive Device: Gait belt;Walker, rolling  Ambulation - Level of Assistance: Contact guard assistance  Gait Abnormalities: Decreased step clearance  Right Side Weight Bearing: Full  Left Side Weight Bearing: Full  Base of Support: Widened  Speed/Hoda: Pace decreased (<100 feet/min)  Step Length: Left shortened;Right shortened     Pain:  Pain Scale 1: Numeric (0 - 10)  Pain Intensity 1: 0     Activity Tolerance: fair      After treatment:   [X] Patient left in no apparent distress sitting up in chair  [ ] Patient left in no apparent distress in bed  [X] Call bell left within reach  [X] Nursing notified  [X] Caregiver present  [X] Bed alarm activated      COMMUNICATION/COLLABORATION:   The patients plan of care was discussed with: Registered Nurse     Fahad Eli PTA   Time Calculation: 20 mins

## 2017-07-06 NOTE — PROGRESS NOTES
Nutrition Assessment:    INTERVENTIONS/RECOMMENDATIONS:   Continue current diet     ASSESSMENT:   Chart reviewed, volvulus improved and advanced to regular diet. Pt reports he tolerated regular diet well for breakfast and consumed ~50%. Pt nor wife had nutrition questions this morning. Will monitor PO intake. Diet Order: Regular  % Eaten:  No data found. Pertinent Medications: [x] Reviewed []Other: (PPI, miralax, KCl, K Na Phosphate)  Pertinent Labs: [x]Reviewed  []Other: (K+ 3.1  Food Allergies: [x]None []Other:     Last BM: 7/5   []Active     []Hyperactive  []Hypoactive       [] Absent  BS  Skin:    [x] Intact   [] Incision  [] Breakdown   []Edema   []Other:    Anthropometrics: Height: 6' (182.9 cm) Weight: 88.7 kg (195 lb 8.8 oz)    IBW (%IBW):   ( ) UBW (%UBW): 95.7 kg (210 lb 15.7 oz) (last fall ) (92.69 %)    BMI: Body mass index is 26.52 kg/(m^2). This BMI is indicative of:  []Underweight   []Normal   [x]Overweight   [] Obesity   [] Extreme Obesity (BMI>40)  Last Weight Metrics:  Weight Loss Metrics 7/3/2017 6/30/2017 11/5/2016 10/29/2013 12/18/2012 6/6/2010   Today's Wt 195 lb 8.8 oz 197 lb 1.5 oz 211 lb 6.7 oz 205 lb 0.4 oz 213 lb 6.5 oz 209 lb 14.1 oz   BMI 26.52 kg/m2 26.73 kg/m2 28.67 kg/m2 27.8 kg/m2 28.94 kg/m2 28.46 kg/m2       Estimated Nutrition Needs (Based on): 1909 Kcals/day (PSU (MSJ 1434)) , 89 g (1gPro/kg) Protein  Carbohydrate: At Least 130 g/day  Fluids: 1909 mL/day or per primary team    NUTRITION DIAGNOSES:   Problem:  Altered GI function      Etiology: related to sigmoid volvulus      Signs/Symptoms: as evidenced by need for decompression, NGT to suction, NPO. NUTRITION INTERVENTIONS:  Meals/Snacks: General/healthful diet                  GOAL:   consume >50% of meals in 3-5 days    NUTRITION MONITORING AND EVALUATION      Food/Nutrient Intake Outcomes:  Total energy intake  Physical Signs/Symptoms Outcomes: GI, Glucose profile, GI profile, Electrolyte and renal profile, Weight/weight change    Previous Goal Met:   [x] Met              [] Progressing Towards Goal              [] Not Progressing Towards Goal   Previous Recommendations:   [x] Implemented          [] Not Implemented          [] Not Applicable    LEARNING NEEDS (Diet, Food/Nutrient-Drug Interaction):    [x] None Identified   [] Identified and Education Provided/Documented   [] Identified and Pt declined/was not appropriate     Cultural, Mandaeism, OR Ethnic Dietary Needs:    [x] None Identified   [] Identified and Addressed     [x] Interdisciplinary Care Plan Reviewed/Documented    [x] Discharge Planning: General healthy diet   [x] Participated in Interdisciplinary Rounds    NUTRITION RISK:    [] High              [x] Moderate           []  Low  []  Minimal/Uncompromised      Tj Salazar RDN  Pager [phone]  Weekend Pager [phone]

## 2017-07-06 NOTE — PROGRESS NOTES
Hospitalist Progress Note    NAME: Danuta First   :  1940   MRN:  [id number]       Assessment / Plan:  Severe sepsis POA  Acute respiratory failure with hypoxia POA requiring intubation, now extubated  RLL community acquired pneumonia POA  E coli UTI POA pan sensitive  Leukocytosis, tachycardic, lactate 2.4  Last CXR with increasing RLL ASD  IV ceftriaxone and azithromycin   Blood cultures NTD, urine culture showed E. coli on 17  Weaned off O2    Sigmoid volvulus with proximal colonic obstruction, severe distention POA  S/P emergent colonic decompression tube 7/3, now out  No signs of perforation or ischemia on CT  Clinically improved  Repeat KUB yesterday with significantly improved distension  Advance diet, linzess at discharge  Outpatient GI follow up    Afib with RVR overnight  remains on amiodarone gtt, now in NSR  Resume metoprolol now that he is back on diet  Cardiology following, await their further input    Hypokalemia POA  Hypophosphatemia POA  Aggressive repletion with KCl PO  monitor bmp, mag    Essential Hypertension POA  Holding home meds for now   Hydralazine PRN     Unspecified kidney disease  Family unclear of exact diagnosis but know that patient has been taking prednisone for the last year and is currently being tapered to 10 mg daily     Body mass index is 26.52 kg/(m^2). Code status: Full  Prophylaxis: Lovenox  Recommended Disposition: Home w/Family     Subjective:     Chief Complaint / Reason for Physician Visit  Pt seen at bedside, Discussed with RN events overnight.    \"I get to eat regular food\"  No abdominal pain, N/V, tolerating diet well  KUB yesterday with improved colonic distension  No CP or SOB  Recurrent episodes of atrial fib with RVR overnight, currently on amiodarone gtt and in NSR  Frequent cough    Review of Systems:  Symptom Y/N Comments  Symptom Y/N Comments   Fever/Chills n   Chest Pain n    Poor Appetite    Edema     Cough y   Abdominal Pain n    Sputum Joint Pain     SOB/ANDERSON n   Pruritis/Rash     Nausea/vomit n   Tolerating PT/OT     Diarrhea    Tolerating Diet n    Constipation    Other       Could NOT obtain due to:      Objective:     VITALS:   Last 24hrs VS reviewed since prior progress note. Most recent are:  Patient Vitals for the past 24 hrs:   Temp Pulse Resp BP SpO2   07/06/17 0710 97.6 °F (36.4 °C) 65 20 111/82 96 %   07/06/17 0321 98.1 °F (36.7 °C) (!) 113 18 (!) 149/91 97 %   07/06/17 0011 98 °F (36.7 °C) 88 18 160/79 90 %   07/05/17 1831 97.6 °F (36.4 °C) (!) 123 20 135/69 96 %   07/05/17 1515 97.4 °F (36.3 °C) 82 20 144/71 96 %   07/05/17 1146 97.3 °F (36.3 °C) 80 18 155/74 92 %       Intake/Output Summary (Last 24 hours) at 07/06/17 0827  Last data filed at 07/06/17 0321   Gross per 24 hour   Intake          1171.83 ml   Output              250 ml   Net           921.83 ml        PHYSICAL EXAM:  General: WD, WN. Alert, cooperative, no acute distress, coughing  EENT:  Dry oral mucosa, no adenopathy  Resp:  Rhonchi bilaterally, no wheezing or rales. No accessory muscle use  CV:  irregular  rhythm,  No edema  GI:  Soft, Non distended, not tender  Neurologic:  Awake, alert, follows commands  Psych:   Not agitated or anxious  Skin:  No rashes. No jaundice    Reviewed most current lab test results and cultures  YES  Reviewed most current radiology test results   YES  Review and summation of old records today    NO  Reviewed patient's current orders and MAR    YES  PMH/SH reviewed - no change compared to H&P  ________________________________________________________________________  Care Plan discussed with:    Comments   Patient x    Family      RN x    Care Manager     Consultant                        Multidiciplinary team rounds were held today with , nursing, pharmacist and clinical coordinator. Patient's plan of care was discussed; medications were reviewed and discharge planning was addressed. ________________________________________________________________________  Total NON critical care TIME:  25   Minutes    Total CRITICAL CARE TIME Spent:   Minutes non procedure based      Comments   >50% of visit spent in counseling and coordination of care     ________________________________________________________________________  Cassandra Burkitt, MD     Procedures: see electronic medical records for all procedures/Xrays and details which were not copied into this note but were reviewed prior to creation of Plan. LABS:  I reviewed today's most current labs and imaging studies.   Pertinent labs include:  Recent Labs      07/06/17 0216 07/05/17 0241 07/04/17 0410   WBC  9.3  8.6  8.0   HGB  12.2  11.9*  11.5*   HCT  36.4*  35.7*  34.1*   PLT  226  193  160     Recent Labs      07/06/17 0216 07/05/17 0241 07/04/17 0410   NA  135*  136  141   K  3.1*  3.1*  3.5   CL  101  100  107   CO2  26  29  26   GLU  91  95  96   BUN  6  8  11   CREA  0.64*  0.58*  0.69*   CA  7.6*  7.3*  7.2*   MG   --    --   2.4   PHOS  2.8   --   1.9*   ALB   --    --   2.6*   TBILI   --    --   0.7   SGOT   --    --   31   ALT   --    --   19       Signed: Cassandra Burkitt, MD

## 2017-07-06 NOTE — PROGRESS NOTES
[address] SHIFT NURSING NOTE    Bedside shift change report given to Rain (oncoming nurse) by Racquel Castillo (offgoing nurse). Report included the following information SBAR, Kardex, ED Summary, Procedure Summary, Intake/Output, MAR, Accordion, Recent Results, Med Rec Status and Cardiac Rhythm NSR/a-fib. SHIFT SUMMARY: ***        Admission Date 7/1/2017   Admission Diagnosis Respiratory failure ([address]  colon decompression   Consults IP CONSULT TO GASTROENTEROLOGY  IP CONSULT TO GENERAL SURGERY  IP CONSULT TO CARDIOLOGY        Consults   [] PT   [] OT   [] Speech   [] Palliative      [] Hospice    [] Case Management   [] None   Cardiac Monitoring   [] Yes   [] No     Antibiotics   [] Yes   [] No   GI Prophylaxis  (Ex: Protonix, Pepcid, etc,.)   [] Yes   [] No          DVT Prophylaxis   SCDs:  Sequential Compression Device: Bilateral          Gaudencio stockings:         [] Medication (Ex: Lovenox, Eliquis, Brilinta, Coumadin,  Heparin, etc..)   [] Contraindicated   [] No VTE needed       Urinary Catheter [REMOVED] Urinary Catheter 07/02/17 Huang-Criteria for Appropriate Use: Strict I/Os     [REMOVED] Urinary Catheter 07/02/17 Huang-Urine Output (mL): 50 ml     LDAs         PICC Triple Lumen 58/86/94 Right;Basilic (Active)   Central Line Being Utilized Yes 7/6/2017  4:00 PM   Criteria for Appropriate Use Irritant/vesicant medication 7/6/2017  4:00 PM   Site Assessment Clean, dry, & intact 7/6/2017  4:00 PM   Phlebitis Assessment 0 7/6/2017  4:00 PM   Infiltration Assessment 0 7/6/2017  4:00 PM   Arm Circumference (cm) 32 cm 7/3/2017 11:51 AM   Date of Last Dressing Change 07/03/17 7/6/2017  4:00 PM   Dressing Status Clean, dry, & intact 7/6/2017  4:00 PM   External Catheter Length (cm) 0 centimeters 7/6/2017  3:21 AM   Dressing Type Disk with Chlorhexadine gluconate (CHG); Transparent 7/6/2017  4:00 PM   Action Taken Blood drawn 7/6/2017  3:21 AM   Hub Color/Line Status White;Flushed 7/6/2017  4:00 PM   Positive Blood Return (Site #1) Yes 7/6/2017  4:00 PM   Hub Color/Line Status Gray;Flushed 7/6/2017  4:00 PM   Positive Blood Return (Site #2) Yes 7/6/2017  4:00 PM   Hub Color/Line Status Red;Flushed 7/6/2017  4:00 PM   Positive Blood Return (Site #3) Yes 7/6/2017  4:00 PM   Alcohol Cap Used Yes 7/6/2017  4:00 PM                            I/Os   Intake/Output Summary (Last 24 hours) at 07/06/17 1635  Last data filed at 07/06/17 0321   Gross per 24 hour   Intake          1171.83 ml   Output              250 ml   Net           921.83 ml         Activity Level Activity Level: Up with Assistance     Activity Assistance: Partial (one person)   Diet Active Orders   Diet    DIET REGULAR      Purposeful Rounding every 1-2 hour? [] Yes    Bill Score  Total Score: 3   Bed Alarm (If score 3 or >)   [] Yes    [] Refused (See signed refusal form in chart)   Alban Score  Alban Score: 19       Alban Score (if score 14 or less)   [] PMT consult   [] Nutrition consult   [] Wound Care consult      []  Specialty bed         Influenza Vaccine Received Flu Vaccine for Current Season (usually Sept-March): Not Flu Season               Needs prior to discharge:   Home O2 required:    [] Yes   [] No     If yes, how much O2 required?     Other:    Last Bowel Movement Date: 07/06/17   Readmission Risk Assessment Tool Score Low Risk            11       Total Score        2 Patient Living Status    4 More than 1 Admission in calendar year    5 Patient Insurance is Medicare, Medicaid or Self Pay        Criteria that do not apply:    Relationship with PCP    Patient Length of Stay > 5    Charlson Comorbidity Score       Expected Length of Stay 4d 21h   Actual Length of Stay 4

## 2017-07-06 NOTE — PROGRESS NOTES
Cardio.   F/u has been scheduled with Joselin Lee NP for July 17 at 8:45am     F/u with Dr. Lewis Hummel had been scheduled for July 11 at 12:45pm.  PCP f/u has been scheduled with Dr. Truong Zuleta for July 10 at 8:30am

## 2017-07-06 NOTE — PROGRESS NOTES
Surgery      Continues to have BM    abd soft    Xray yesterday improved. Plan as per GI    Will sign off case. Lior Molina.  [facility]

## 2017-07-06 NOTE — PROGRESS NOTES
Reviewed the chart and therapy mobilized patient on RA and his SAT's stayed in the 90's thus no home oxygen is needed. Will discuss rehab with patient and if he declines recommend home health. Patient will also need a rolling walker for home use. Will ask the specialist to schedule medical PCP and GI appointments as discharge is anticipated in 1-2 days.      Ex [id number]

## 2017-07-06 NOTE — PROGRESS NOTES
Gastroenterology Progress Note    7/6/2017    Admit Date: 7/1/2017    Subjective: Follow up for: colonic distension and sigmoid volvulus. No new GI problems. Wants to eat regular food. Patient was seen in rounds by me today. Afib noted.       Current Facility-Administered Medications   Medication Dose Route Frequency    pantoprazole (PROTONIX) tablet 40 mg  40 mg Oral ACB    potassium, sodium phosphates (NEUTRA-PHOS) packet 1 Packet  1 Packet Oral QID    azithromycin (ZITHROMAX) tablet 250 mg  250 mg Oral DAILY    dextrose 5% - 0.9% NaCl with KCl 20 mEq/L infusion  75 mL/hr IntraVENous CONTINUOUS    metoprolol succinate (TOPROL-XL) XL tablet 25 mg  25 mg Oral DAILY    amiodarone (CORDARONE) 450 mg in dextrose 5% 250 mL infusion  0.5 mg/min IntraVENous CONTINUOUS    cefTRIAXone (ROCEPHIN) 1 g in 0.9% sodium chloride (MBP/ADV) 50 mL  1 g IntraVENous Q24H    polyethylene glycol (MIRALAX) packet 17 g  17 g Oral DAILY    enoxaparin (LOVENOX) injection 40 mg  40 mg SubCUTAneous Q24H    sodium chloride (NS) flush 10-30 mL  10-30 mL InterCATHeter PRN    sodium chloride (NS) flush 10 mL  10 mL InterCATHeter Q24H    sodium chloride (NS) flush 10 mL  10 mL InterCATHeter PRN    sodium chloride (NS) flush 10-40 mL  10-40 mL InterCATHeter Q8H    sodium chloride (NS) flush 20 mL  20 mL InterCATHeter PRN    heparin (porcine) pf 300 Units  300 Units InterCATHeter PRN    LORazepam (ATIVAN) injection 0.5 mg  0.5 mg IntraVENous Q4H PRN    benzonatate (TESSALON) capsule 100 mg  100 mg Oral Q8H PRN    guaiFENesin-dextromethorphan (ROBITUSSIN DM) 100-10 mg/5 mL syrup 10 mL  10 mL Oral Q6H PRN    mupirocin (BACTROBAN) 2 % ointment   Both Nostrils BID    sodium chloride (NS) flush 5-10 mL  5-10 mL IntraVENous PRN    acetaminophen (TYLENOL) tablet 650 mg  650 mg Oral Q4H PRN    ondansetron (ZOFRAN) injection 4 mg  4 mg IntraVENous Q4H PRN    hydrALAZINE (APRESOLINE) 20 mg/mL injection 20 mg  20 mg IntraVENous Q6H PRN    zinc oxide-cod liver oil (DESITIN) 40 % paste   Topical PRN        Objective:     Blood pressure 111/82, pulse 65, temperature 97.6 °F (36.4 °C), resp. rate 20, height 6' (1.829 m), weight 88.7 kg (195 lb 8.8 oz), SpO2 96 %. 07/04 1901 - 07/06 0700  In: 1171.8 [I.V.:1171.8]  Out: 600 [Urine:600]        Physical Examination:       General:intubated  HEENT:  ETT  Chest:  CTA,   Heart: S1, S2, RRR  GI: Soft,  Less distended, +bowel sounds      Data Review    Recent Results (from the past 24 hour(s))   PHOSPHORUS    Collection Time: 07/06/17  2:16 AM   Result Value Ref Range    Phosphorus 2.8 2.6 - 4.7 MG/DL   CBC WITH AUTOMATED DIFF    Collection Time: 07/06/17  2:16 AM   Result Value Ref Range    WBC 9.3 4.1 - 11.1 K/uL    RBC 3.77 (L) 4.10 - 5.70 M/uL    HGB 12.2 12.1 - 17.0 g/dL    HCT 36.4 (L) 36.6 - 50.3 %    MCV 96.6 80.0 - 99.0 FL    MCH 32.4 26.0 - 34.0 PG    MCHC 33.5 30.0 - 36.5 g/dL    RDW 13.0 11.5 - 14.5 %    PLATELET 728 023 - 981 K/uL    NEUTROPHILS 62 32 - 75 %    LYMPHOCYTES 24 12 - 49 %    MONOCYTES 11 5 - 13 %    EOSINOPHILS 3 0 - 7 %    BASOPHILS 0 0 - 1 %    ABS. NEUTROPHILS 5.8 1.8 - 8.0 K/UL    ABS. LYMPHOCYTES 2.2 0.8 - 3.5 K/UL    ABS. MONOCYTES 1.0 0.0 - 1.0 K/UL    ABS. EOSINOPHILS 0.2 0.0 - 0.4 K/UL    ABS.  BASOPHILS 0.0 0.0 - 0.1 K/UL   METABOLIC PANEL, BASIC    Collection Time: 07/06/17  2:16 AM   Result Value Ref Range    Sodium 135 (L) 136 - 145 mmol/L    Potassium 3.1 (L) 3.5 - 5.1 mmol/L    Chloride 101 97 - 108 mmol/L    CO2 26 21 - 32 mmol/L    Anion gap 8 5 - 15 mmol/L    Glucose 91 65 - 100 mg/dL    BUN 6 6 - 20 MG/DL    Creatinine 0.64 (L) 0.70 - 1.30 MG/DL    BUN/Creatinine ratio 9 (L) 12 - 20      GFR est AA >60 >60 ml/min/1.73m2    GFR est non-AA >60 >60 ml/min/1.73m2    Calcium 7.6 (L) 8.5 - 10.1 MG/DL     Recent Labs      07/06/17   0216  07/05/17   0241   WBC  9.3  8.6   HGB  12.2  11.9*   HCT  36.4*  35.7*   PLT  226  193     Recent Labs 07/06/17   0216  07/05/17   0241  07/04/17   0410   NA  135*  136  141   K  3.1*  3.1*  3.5   CL  101  100  107   CO2  26  29  26   BUN  6  8  11   CREA  0.64*  0.58*  0.69*   GLU  91  95  96   CA  7.6*  7.3*  7.2*   MG   --    --   2.4   PHOS  2.8   --   1.9*     Recent Labs      07/04/17   0410   SGOT  31   AP  54   TP  5.8*   ALB  2.6*   GLOB  3.2     No results for input(s): INR, PTP, APTT in the last 72 hours. No lab exists for component: INREXT, INREXT   No results for input(s): FE, TIBC, PSAT, FERR in the last 72 hours. No results found for: FOL, RBCF   Recent Labs      07/03/17   1228   PH  7.45   PCO2  34*   PO2  62*     No results for input(s): CPK, CKNDX, TROIQ in the last 72 hours. No lab exists for component: CPKMB  No results found for: CHOL, CHOLX, CHLST, CHOLV, HDL, LDL, LDLC, DLDLP, TGLX, TRIGL, TRIGP, CHHD, CHHDX  No components found for: [place]  Lab Results   Component Value Date/Time    Color YELLOW/STRAW 07/02/2017 04:38 AM    Appearance CLEAR 07/02/2017 04:38 AM    Specific gravity 1.020 07/02/2017 04:38 AM    Specific gravity 1.022 06/30/2017 10:32 PM    pH (UA) 6.0 07/02/2017 04:38 AM    Protein 100 07/02/2017 04:38 AM    Glucose NEGATIVE  07/02/2017 04:38 AM    Ketone TRACE 07/02/2017 04:38 AM    Bilirubin NEGATIVE  11/05/2016 01:30 PM    Urobilinogen 1.0 07/02/2017 04:38 AM    Nitrites NEGATIVE  07/02/2017 04:38 AM    Leukocyte Esterase NEGATIVE  07/02/2017 04:38 AM    Epithelial cells FEW 07/02/2017 04:38 AM    Bacteria NEGATIVE  07/02/2017 04:38 AM    WBC 0-4 07/02/2017 04:38 AM    RBC 10-20 07/02/2017 04:38 AM        ROS: -CP, SOB, Dysuria, palpitations, cough. Assessment:    Colonic distension,  Sigmoid volvulus,  Hypokalemia    Active Problems:    Respiratory failure ([address] (7/2/2017)             Plan/Discussion:     AXR shows significant improvement in degreeof colonic distention, with mild residual. Small bowel remains nondistended. There is no significant stool.    I suggest advancement to a regular diet. Keep K+ in normal range. D/c home, after cardiology clearance, with close f/u with Dr Rogers(primary GI). Discharge on Linzess 145 mcg PO every day. I will sign off. Thank you. Signed By: Adryan Castañeda.  Maik Ortega MD    7/6/2017  11:01 AM

## 2017-07-07 LAB
ANION GAP BLD CALC-SCNC: 7 MMOL/L (ref 5–15)
BASOPHILS # BLD AUTO: 0.1 K/UL (ref 0–0.1)
BASOPHILS # BLD: 1 % (ref 0–1)
BUN SERPL-MCNC: 8 MG/DL (ref 6–20)
BUN/CREAT SERPL: 11 (ref 12–20)
CALCIUM SERPL-MCNC: 7.4 MG/DL (ref 8.5–10.1)
CHLORIDE SERPL-SCNC: 101 MMOL/L (ref 97–108)
CO2 SERPL-SCNC: 29 MMOL/L (ref 21–32)
CREAT SERPL-MCNC: 0.75 MG/DL (ref 0.7–1.3)
EOSINOPHIL # BLD: 0.3 K/UL (ref 0–0.4)
EOSINOPHIL NFR BLD: 4 % (ref 0–7)
ERYTHROCYTE [DISTWIDTH] IN BLOOD BY AUTOMATED COUNT: 13.1 % (ref 11.5–14.5)
GLUCOSE SERPL-MCNC: 91 MG/DL (ref 65–100)
HCT VFR BLD AUTO: 34.6 % (ref 36.6–50.3)
HGB BLD-MCNC: 11.6 G/DL (ref 12.1–17)
LYMPHOCYTES # BLD AUTO: 33 % (ref 12–49)
LYMPHOCYTES # BLD: 3.2 K/UL (ref 0.8–3.5)
MAGNESIUM SERPL-MCNC: 2.1 MG/DL (ref 1.6–2.4)
MCH RBC QN AUTO: 32.8 PG (ref 26–34)
MCHC RBC AUTO-ENTMCNC: 33.5 G/DL (ref 30–36.5)
MCV RBC AUTO: 97.7 FL (ref 80–99)
MONOCYTES # BLD: 0.8 K/UL (ref 0–1)
MONOCYTES NFR BLD AUTO: 9 % (ref 5–13)
NEUTS SEG # BLD: 5.2 K/UL (ref 1.8–8)
NEUTS SEG NFR BLD AUTO: 53 % (ref 32–75)
PHOSPHATE SERPL-MCNC: 4.7 MG/DL (ref 2.6–4.7)
PLATELET # BLD AUTO: 206 K/UL (ref 150–400)
POTASSIUM SERPL-SCNC: 3.8 MMOL/L (ref 3.5–5.1)
RBC # BLD AUTO: 3.54 M/UL (ref 4.1–5.7)
SODIUM SERPL-SCNC: 137 MMOL/L (ref 136–145)
WBC # BLD AUTO: 9.6 K/UL (ref 4.1–11.1)

## 2017-07-07 PROCEDURE — 74011250637 HC RX REV CODE- 250/637: Performed by: INTERNAL MEDICINE

## 2017-07-07 PROCEDURE — 83735 ASSAY OF MAGNESIUM: CPT | Performed by: INTERNAL MEDICINE

## 2017-07-07 PROCEDURE — 74011000258 HC RX REV CODE- 258: Performed by: INTERNAL MEDICINE

## 2017-07-07 PROCEDURE — 74011250636 HC RX REV CODE- 250/636: Performed by: INTERNAL MEDICINE

## 2017-07-07 PROCEDURE — 85025 COMPLETE CBC W/AUTO DIFF WBC: CPT | Performed by: INTERNAL MEDICINE

## 2017-07-07 PROCEDURE — 74011250637 HC RX REV CODE- 250/637: Performed by: FAMILY MEDICINE

## 2017-07-07 PROCEDURE — 36415 COLL VENOUS BLD VENIPUNCTURE: CPT | Performed by: INTERNAL MEDICINE

## 2017-07-07 PROCEDURE — 65660000000 HC RM CCU STEPDOWN

## 2017-07-07 PROCEDURE — 80048 BASIC METABOLIC PNL TOTAL CA: CPT | Performed by: INTERNAL MEDICINE

## 2017-07-07 PROCEDURE — 97116 GAIT TRAINING THERAPY: CPT

## 2017-07-07 PROCEDURE — 84100 ASSAY OF PHOSPHORUS: CPT | Performed by: INTERNAL MEDICINE

## 2017-07-07 RX ORDER — AMIODARONE HYDROCHLORIDE 200 MG/1
200 TABLET ORAL 2 TIMES DAILY
Qty: 42 TAB | Refills: 0 | Status: SHIPPED | OUTPATIENT
Start: 2017-07-08 | End: 2017-07-22

## 2017-07-07 RX ORDER — METOPROLOL SUCCINATE 25 MG/1
25 TABLET, EXTENDED RELEASE ORAL 2 TIMES DAILY
Status: COMPLETED | OUTPATIENT
Start: 2017-07-07 | End: 2017-07-08

## 2017-07-07 RX ORDER — METOPROLOL SUCCINATE 50 MG/1
25 TABLET, EXTENDED RELEASE ORAL 2 TIMES DAILY
Qty: 30 TAB | Refills: 2 | Status: SHIPPED | OUTPATIENT
Start: 2017-07-07 | End: 2017-07-19

## 2017-07-07 RX ORDER — DOXYCYCLINE HYCLATE 100 MG
100 TABLET ORAL EVERY 12 HOURS
Status: DISCONTINUED | OUTPATIENT
Start: 2017-07-08 | End: 2017-07-08 | Stop reason: HOSPADM

## 2017-07-07 RX ORDER — AMIODARONE HYDROCHLORIDE 200 MG/1
200 TABLET ORAL 2 TIMES DAILY
Status: DISCONTINUED | OUTPATIENT
Start: 2017-07-07 | End: 2017-07-08 | Stop reason: HOSPADM

## 2017-07-07 RX ORDER — POTASSIUM CHLORIDE 20 MEQ/1
40 TABLET, EXTENDED RELEASE ORAL
Status: COMPLETED | OUTPATIENT
Start: 2017-07-07 | End: 2017-07-07

## 2017-07-07 RX ADMIN — AMIODARONE HYDROCHLORIDE 200 MG: 200 TABLET ORAL at 16:28

## 2017-07-07 RX ADMIN — Medication 30 ML: at 05:39

## 2017-07-07 RX ADMIN — POTASSIUM & SODIUM PHOSPHATES POWDER PACK 280-160-250 MG 1 PACKET: 280-160-250 PACK at 12:16

## 2017-07-07 RX ADMIN — POTASSIUM CHLORIDE 40 MEQ: 1500 TABLET, EXTENDED RELEASE ORAL at 09:31

## 2017-07-07 RX ADMIN — CEFTRIAXONE 1 G: 1 INJECTION, POWDER, FOR SOLUTION INTRAMUSCULAR; INTRAVENOUS at 09:04

## 2017-07-07 RX ADMIN — GUAIFENESIN AND DEXTROMETHORPHAN 10 ML: 100; 10 SYRUP ORAL at 16:37

## 2017-07-07 RX ADMIN — AMIODARONE HYDROCHLORIDE 0.5 MG/MIN: 50 INJECTION, SOLUTION INTRAVENOUS at 04:02

## 2017-07-07 RX ADMIN — POTASSIUM & SODIUM PHOSPHATES POWDER PACK 280-160-250 MG 1 PACKET: 280-160-250 PACK at 17:59

## 2017-07-07 RX ADMIN — ASPIRIN 81 MG 81 MG: 81 TABLET ORAL at 09:04

## 2017-07-07 RX ADMIN — POTASSIUM & SODIUM PHOSPHATES POWDER PACK 280-160-250 MG 1 PACKET: 280-160-250 PACK at 09:05

## 2017-07-07 RX ADMIN — AZITHROMYCIN 250 MG: 250 TABLET, FILM COATED ORAL at 09:04

## 2017-07-07 RX ADMIN — POLYETHYLENE GLYCOL 3350 17 G: 17 POWDER, FOR SOLUTION ORAL at 09:05

## 2017-07-07 RX ADMIN — ENOXAPARIN SODIUM 40 MG: 40 INJECTION SUBCUTANEOUS at 09:30

## 2017-07-07 RX ADMIN — PANTOPRAZOLE SODIUM 40 MG: 40 TABLET, DELAYED RELEASE ORAL at 09:05

## 2017-07-07 RX ADMIN — GUAIFENESIN AND DEXTROMETHORPHAN 10 ML: 100; 10 SYRUP ORAL at 05:46

## 2017-07-07 RX ADMIN — METOPROLOL SUCCINATE 25 MG: 25 TABLET, EXTENDED RELEASE ORAL at 17:59

## 2017-07-07 RX ADMIN — Medication 30 ML: at 22:28

## 2017-07-07 RX ADMIN — METOPROLOL SUCCINATE 25 MG: 25 TABLET, EXTENDED RELEASE ORAL at 09:05

## 2017-07-07 RX ADMIN — LORAZEPAM 0.5 MG: 2 INJECTION INTRAMUSCULAR; INTRAVENOUS at 02:48

## 2017-07-07 RX ADMIN — Medication 10 ML: at 12:19

## 2017-07-07 NOTE — PROGRESS NOTES
Cardiology Progress Note  7/7/2017    Admit Date: 7/1/2017  Admit Diagnosis: Respiratory failure ([facility] [address]  colon decompression    Usual cardiologist:  Diamond Posada MD     Assessment: 1. Paroxysmal atrial fibrillation. RVR requiring rate control. Not on OP anticoagulation. 2. History of paroxysmal SVT converted with adenosine in 11/2016. 3. Chronic left bundle branch block. 4. 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 (Placed by Dr. Guy Coyne with our practice). 5. Severe colonic distension with sigmoid volvulus, improving. S/p decompression. No surgery needed. 6. Acute respiratory failure. Intubated and now extubated. 7. Hypokalemia, resolved. 8. Chronic ASA therapy.     Plan:      1. Rate control Afib paroxysms with metoprolol ER 25 bid. Will try not to use diltiazem which can cause constipation at higher dose. 2. Continue low dose amiodarone, changed to oral 200 bid here. 3. Not an immediate anticoagulation candidate. Continue ASA. 4. Will follow-up on pacemaker data from the past to see what the burden of Afib has been before (though that won't change immediate plan). He definitely had an adenosine-sensitive SVT in 2016.  5. Azithromycin and ciprofloxacin can prolong QT along with amiodarone.     If BP OK tomorrow, then can be discharged on metoprolol ER 25 bid, amiodarone 200 bid x 14 days, then 200 daily x 14 days. THESE SCRIPTS ARE ON THE CHART. To the hospitalist team:  PLEASE SEE IF AT LEAST ONE OF THE QT-PROLONGING ANTIBIOTICS (azithromycin or ciprofloxacin) can be discontinued since he's also on amiodarone.      For other plans, see orders.   High complexity decision making was performed  X Yes   High-risk of decompensation with multiple organ involvement X Yes   Hospital problem list   Active Hospital Problems    Diagnosis Date Noted    Respiratory failure ([facility] [address] 07/02/2017 Subjective:  Patient reports  []   nothing; unable to communicate    []   intubated   Chest pain X none  consistent with:  Non-cardiac CP         Atypical CP     None now  On going  Anginal CP     Dyspnea X none  at rest  with exertion         improved  unchanged  worse              PND X none  overnight       Orthopnea X none  improved  unchanged  worse   Presyncope X none  improved  unchanged  worse   Ambulated in hallway without symptoms   Yes   Ambulated in room without symptoms  Yes     ROS Hematuria:  Yes X No Dysuria:  Yes X No                (2+  other systems) Cough: Yes X No Sputum: Yes X No                 BRBPR:  Yes X No Melena: Yes X No   No change in family and social history from H&P/Consult note.   Objective:    Physical Exam:  24 hr VS reviewed, overall VSSAF  Temp (24hrs), Av.7 °F (36.5 °C), Min:97.5 °F (36.4 °C), Max:97.9 °F (36.6 °C)    Patient Vitals for the past 8 hrs:   Pulse   17 1529 82   17 1129 83    Patient Vitals for the past 8 hrs:   Resp   17 1529 16   17 1129 18    Patient Vitals for the past 8 hrs:   BP   17 1529 137/68   17 1129 119/60          Intake/Output Summary (Last 24 hours) at 17 1747  Last data filed at 17 0930   Gross per 24 hour   Intake           871.29 ml   Output                0 ml   Net           871.29 ml     General: x WD,WN  Elderly  Cachetic x NAD     Agitated  Lethargic  Arousable  Obtunded     Sedated  On Bipap  Intubated                ENT/Palate: X WNL  Dry MM  anicteric                Respiratory:  CTA  Nl resp effort  Increased effort  No significant change    x scattered rhonchi  rales  improved  worse              Cardiovasc: X RRR  IRRR X Nl S1, S2 x No rub     No murmur X No new murmur  Murmur c/w: x No gallop    x No edema  BLE edema:+  RLE edema:+  LLE edema:+     Edema less  Edema more  Edema same  Edema worse    X Nl JVP  Elevated JVP  JVP same  JVP worse    X Carotid wnl x abd aorta not palpated X no peripheral emboli noted                GI: X abd soft x nondistended X BS present X No organo- megaly noted              Skin: X Warm, dry  Cold extremites                  Neuro: x A/O x Grossly non- focal  Obtunded  Sedated     Lethargic  Arousable  intubated       cath site intact w/o hematoma or new bruit; distal pulse unchanged  Yes   Data Review:     Telemetry independently reviewed x sinus x V paced  parox afib  NSVT     ECG independently reviewed  NSR  afib  no significant changes  NSST-Tw chgs   x no new ECG provided for review   Lab results reviewed as noted below. Current medications reviewed as noted below. No results for input(s): PH, PCO2, PO2 in the last 72 hours. No results for input(s): CPK, CKMB in the last 72 hours. No lab exists for component: TROPONINI  Recent Labs      07/07/17   0241  07/06/17   0216  07/05/17   0241   NA  137  135*  136   K  3.8  3.1*  3.1*   CL  101  101  100   CO2  29  26  29   BUN  8  6  8   CREA  0.75  0.64*  0.58*   GLU  91  91  95   PHOS  4.7  2.8   --    CA  7.4*  7.6*  7.3*   WBC  9.6  9.3  8.6   HGB  11.6*  12.2  11.9*   HCT  34.6*  36.4*  35.7*   PLT  206  226  193     No results for input(s): SGOT, GPT, AP, TBIL, TBILI, TP, ALB, GLOB, GGT, AML, LPSE in the last 72 hours. No lab exists for component: AMYP, HLPSE  No results for input(s): INR, PTP, APTT in the last 72 hours. No lab exists for component: INREXT, INREXT   No results for input(s): FE, TIBC, PSAT, FERR in the last 72 hours.    No results found for: GLUCPOC    Current Facility-Administered Medications   Medication Dose Route Frequency    amiodarone (CORDARONE) tablet 200 mg  200 mg Oral BID    metoprolol succinate (TOPROL-XL) XL tablet 25 mg  25 mg Oral BID    aspirin chewable tablet 81 mg  81 mg Oral DAILY    pantoprazole (PROTONIX) tablet 40 mg  40 mg Oral ACB    potassium, sodium phosphates (NEUTRA-PHOS) packet 1 Packet  1 Packet Oral QID    azithromycin (ZITHROMAX) tablet 250 mg  250 mg Oral DAILY    cefTRIAXone (ROCEPHIN) 1 g in 0.9% sodium chloride (MBP/ADV) 50 mL  1 g IntraVENous Q24H    polyethylene glycol (MIRALAX) packet 17 g  17 g Oral DAILY    enoxaparin (LOVENOX) injection 40 mg  40 mg SubCUTAneous Q24H    sodium chloride (NS) flush 10-30 mL  10-30 mL InterCATHeter PRN    sodium chloride (NS) flush 10 mL  10 mL InterCATHeter Q24H    sodium chloride (NS) flush 10 mL  10 mL InterCATHeter PRN    sodium chloride (NS) flush 10-40 mL  10-40 mL InterCATHeter Q8H    sodium chloride (NS) flush 20 mL  20 mL InterCATHeter PRN    heparin (porcine) pf 300 Units  300 Units InterCATHeter PRN    LORazepam (ATIVAN) injection 0.5 mg  0.5 mg IntraVENous Q4H PRN    benzonatate (TESSALON) capsule 100 mg  100 mg Oral Q8H PRN    guaiFENesin-dextromethorphan (ROBITUSSIN DM) 100-10 mg/5 mL syrup 10 mL  10 mL Oral Q6H PRN    sodium chloride (NS) flush 5-10 mL  5-10 mL IntraVENous PRN    acetaminophen (TYLENOL) tablet 650 mg  650 mg Oral Q4H PRN    ondansetron (ZOFRAN) injection 4 mg  4 mg IntraVENous Q4H PRN    hydrALAZINE (APRESOLINE) 20 mg/mL injection 20 mg  20 mg IntraVENous Q6H PRN    zinc oxide-cod liver oil (DESITIN) 40 % paste   Topical PRN         Xiomara Ahmadi MD

## 2017-07-07 NOTE — ROUTINE PROCESS
Called and left word with Dr. Pratik Lucio office that Dr. Deondre Julian will be ok with patient being discharged today. Patient has been in and out of afib with SR.  Right now HR is SR 86

## 2017-07-07 NOTE — PROGRESS NOTES
[address] SHIFT NURSING NOTE    Bedside shift change report given to aarti  (oncoming nurse) by [place]  (offgoing nurse). Report included the following information SBAR. SHIFT SUMMARY: patient to go home tomorrow. Amiodarone GTT discontinued and he took oral dose. Admission Date 7/1/2017   Admission Diagnosis Respiratory failure ([address]  colon decompression   Consults IP CONSULT TO GASTROENTEROLOGY  IP CONSULT TO GENERAL SURGERY  IP CONSULT TO CARDIOLOGY        Consults   [] PT   [] OT   [] Speech   [] Palliative      [] Hospice    [] Case Management   [] None   Cardiac Monitoring   [] Yes   [] No     Antibiotics   [] Yes   [] No   GI Prophylaxis  (Ex: Protonix, Pepcid, etc,.)   [] Yes   [] No          DVT Prophylaxis   SCDs:  Sequential Compression Device: Bilateral          Gaudencio stockings:         [] Medication (Ex: Lovenox, Eliquis, Brilinta, Coumadin,  Heparin, etc..)   [] Contraindicated   [] No VTE needed       Urinary Catheter [REMOVED] Urinary Catheter 07/02/17 Huang-Criteria for Appropriate Use: Strict I/Os     [REMOVED] Urinary Catheter 07/02/17 Huang-Urine Output (mL): 50 ml     LDAs         PICC Triple Lumen 26/53/70 Right;Basilic (Active)   Central Line Being Utilized Yes 7/7/2017  4:35 PM   Criteria for Appropriate Use Irritant/vesicant medication 7/7/2017 12:20 PM   Site Assessment Clean; Intact;Dry 7/7/2017  4:35 PM   Phlebitis Assessment 0 7/7/2017 12:20 PM   Infiltration Assessment 0 7/7/2017 12:20 PM   Arm Circumference (cm) 32 cm 7/3/2017 11:51 AM   Date of Last Dressing Change 07/03/17 7/7/2017  3:18 AM   Dressing Status Clean, dry, & intact 7/7/2017  3:18 AM   External Catheter Length (cm) 0 centimeters 7/7/2017  3:18 AM   Dressing Type Disk with Chlorhexadine gluconate (CHG); Transparent 7/7/2017  3:18 AM   Action Taken Blood drawn 7/7/2017  3:18 AM   Hub Color/Line Status White;Capped 7/7/2017  4:35 PM   Positive Blood Return (Site #1) Yes 7/7/2017  4:35 PM   Hub Color/Line Status Delwyn Dew 7/7/2017  4:35 PM   Positive Blood Return (Site #2) Yes 7/7/2017  4:35 PM   Hub Color/Line Status Red 7/7/2017  4:35 PM   Positive Blood Return (Site #3) Yes 7/7/2017  4:35 PM   Alcohol Cap Used Yes 7/7/2017  3:18 AM                            I/Os   Intake/Output Summary (Last 24 hours) at 07/07/17 1815  Last data filed at 07/07/17 0930   Gross per 24 hour   Intake           871.29 ml   Output                0 ml   Net           871.29 ml         Activity Level Activity Level: Head of bed elevated (degrees)     Activity Assistance: Partial (one person)   Diet Active Orders   Diet    DIET REGULAR      Purposeful Rounding every 1-2 hour? [] Yes    Bill Score  Total Score: 3   Bed Alarm (If score 3 or >)   [] Yes    [] Refused (See signed refusal form in chart)   Alban Score  Alban Score: 19       Alban Score (if score 14 or less)   [] PMT consult   [] Nutrition consult   [] Wound Care consult      []  Specialty bed         Influenza Vaccine Received Flu Vaccine for Current Season (usually Sept-March): Not Flu Season               Needs prior to discharge:   Home O2 required:    [] Yes   [] No     If yes, how much O2 required?     Other:    Last Bowel Movement Date: 07/07/17   Readmission Risk Assessment Tool Score Medium Risk            14       Total Score        2 Patient Living Status    3 Patient Length of Stay > 5    4 More than 1 Admission in calendar year    5 Patient Insurance is Medicare, Medicaid or Self Pay        Criteria that do not apply:    Relationship with PCP    Charlson Comorbidity Score       Expected Length of Stay 4d 21h   Actual Length of Stay 5

## 2017-07-07 NOTE — PROGRESS NOTES
Patient has progressed with therapy and now the recommendation is home with home health. Discussed home care with patient and he was agreeable. Freedom of choice provided and referral placed to [facility] care per patient request. Patient will also need a rolling walker and one will be delivered to patient today by [employer].     In anticipation of discharge, specialist has made PCP, GI and cardiologist appointments    Care Management Interventions  PCP Verified by CM:  Yes  Transition of Care Consult (CM Consult): [address]: Yes  MyChart Signup: No  Discharge Durable Medical Equipment: Yes (rolling walker)  Physical Therapy Consult: Yes  Occupational Therapy Consult: No  Speech Therapy Consult: No  Current Support Network: Lives with Spouse  Confirm Follow Up Transport: Family  Plan discussed with Pt/Family/Caregiver: Yes  Freedom of Choice Offered: Yes  Discharge Location  Discharge Placement: Home with home health

## 2017-07-07 NOTE — PROGRESS NOTES
Problem: Mobility Impaired (Adult and Pediatric)  Goal: *Acute Goals and Plan of Care (Insert Text)  Physical Therapy Goals  Initiated 7/5/2017  1. Patient will move from supine to sit and sit to supine in bed with independence within 7 day(s). 2. Patient will transfer from bed to chair and chair to bed with modified independence using the least restrictive device within 7 day(s). 3. Patient will perform sit to stand with modified independence within 7 day(s). 4. Patient will ambulate with modified independence for 120 feet with the least restrictive device within 7 day(s). 5. Patient will ascend/descend 4 stairs with 1 handrail(s) with modified independence within 7 day(s). PHYSICAL THERAPY TREATMENT  Patient: Jeff Barrientos (14 y.o. male)  Date: 7/7/2017  Diagnosis: Respiratory failure ([address] colon decompression      Procedure(s) (LRB):  COLONOSCOPY ER 21 (N/A)  COLON DECOMPRESSION (N/A) 5 Days Post-Op      Precautions: Fall  Chart, physical therapy assessment, plan of care and goals were reviewed. ASSESSMENT: pt tolerated tx very well, no LOB or SOB, eager to go home, good motivation, does well with tranfers, vc's for safety and proper RW use. Progression toward goals:  [X]      Improving appropriately and progressing toward goals  [ ]      Improving slowly and progressing toward goals  [ ]      Not making progress toward goals and plan of care will be adjusted       PLAN:  Patient continues to benefit from skilled intervention to address the above impairments. Continue treatment per established plan of care.   Discharge Recommendations:  Home Health  Further Equipment Recommendations for Discharge:  rolling walker       OBJECTIVE DATA SUMMARY:      Critical Behavior:  Neurologic State: Alert  Orientation Level: Oriented X4  Cognition: Follows commands     Functional Mobility Training:  Bed Mobility: pt sitting in chair on arrival     Transfers:  Sit to Stand: Stand-by asssistance  Stand to Sit: Stand-by asssistance  Interventions: Verbal cues  Level of Assistance: Stand-by asssistance      Balance:  Sitting: Intact; Without support  Standing: Intact; With support  Standing - Static: Good;Constant support  Standing - Dynamic : Good      Ambulation/Gait Training:  Distance (ft): 200 Feet (ft)  Assistive Device: Gait belt;Walker, rolling  Ambulation - Level of Assistance: Stand-by asssistance  Gait Abnormalities: Decreased step clearance  Right Side Weight Bearing: Full  Left Side Weight Bearing: Full  Base of Support: Widened  Speed/Hoda: Pace decreased (<100 feet/min)  Step Length: Left shortened;Right shortened     Pain:  Pain Scale 1: Numeric (0 - 10)  Pain Intensity 1: 0     Activity Tolerance: fair     After treatment:   [X] Patient left in no apparent distress sitting up in chair  [ ] Patient left in no apparent distress in bed  [X] Call bell left within reach  [X] Nursing notified  [X] Caregiver present  [ ] Bed alarm activated      COMMUNICATION/COLLABORATION:   The patients plan of care was discussed with: Registered Nurse     Sahra Sexton PTA   Time Calculation: 20 mins

## 2017-07-07 NOTE — PROGRESS NOTES
Bedside shift change report given to Gideon Bailon RN (oncoming nurse) by JOSE EDUARDO Landers (offgoing nurse). Report included the following information SBAR.     0200: Pt called out to use restroom. Ambulated to bathroom and pt became SOB. O2 sats were 92% upon returning to bed. 0240: Pt called out and asked \"Can I please have the thing that goes in my nose\" referring to the nasal cannula. Pt appears to be in distress and using accessory muscles to breathe. Checked O2 sats before applying nasal cannula and O2 was at 88%. Places 2L NC on pt and sat him up straight in the bed and he recovered to 95%. Pt states he feels much better with the oxygen on. I asked pt if he experiences this at home. He states that he will get SOB before getting in bed and he will take a few slow deep breathes and it will go away. Pt states that he only feels this way because he is in the hospital and that it will be fine when he gets home. Pt also states that he sees a pulmonologist and was told he has \"weak breathing muscles\". Lowered O2 down to 1L, pt O2 sats are 93%. Appears to be breathing easier at this time.

## 2017-07-07 NOTE — PROGRESS NOTES
Hospitalist Progress Note    NAME: Pasquale Plunkett   :  1940   MRN:  [id number]       Interim Hospital Summary: 78 y.o. male whom presented on 2017 with      Assessment / Plan:  Severe sepsis POA  Acute respiratory failure with hypoxia POA requiring intubation, now extubated  RLL community acquired pneumonia POA  E coli UTI POA pan sensitive  - WBC 9.6, lactate down to 0.6 from 2.4  - CXR (): Increased airspace disease in the right base  - continue with IV ceftriaxone and azithromycin   - Blood cultures NTD, urine culture showed E. coli on 17  - Weaned off O2; room air O2 Sat 95%     Sigmoid volvulus with proximal colonic obstruction, severe distention POA  - S/P emergent colonic decompression tube 7/3, now out  - No signs of perforation or ischemia on CT  - Clinically improved  - Repeat KUB  with significantly improved distension  - GI signed off on ; tolerating cardiac diet. Pt to go home with linzess at discharge  - Outpatient GI follow up     Afib with RVR overnight  - Cardiology following; BB has been increased to 25mg Bid. Started on PO amio 200mg twice a day  - currently stays in NSR  - will monitor pt over night     Hypokalemia POA  Hypophosphatemia POA  - resolved     Essential Hypertension POA  - continue with BB  - Hydralazine PRN      Unspecified kidney disease  - Family unclear of exact diagnosis but know that patient has been taking prednisone for the last year and is currently being tapered to 10 mg daily      Body mass index is 26.52 kg/(m^2).     Code status: Full  Prophylaxis: Lovenox  Recommended Disposition: Home w/Family       Subjective:     Chief Complaint / Reason for Physician Visit  \"I am ready to go home\". Discussed with RN events overnight.      Review of Systems:  Symptom Y/N Comments  Symptom Y/N Comments   Fever/Chills n   Chest Pain n    Poor Appetite    Edema     Cough y   Abdominal Pain     Sputum n   Joint Pain     SOB/ANDERSON n   Pruritis/Rash Nausea/vomit n   Tolerating PT/OT     Diarrhea    Tolerating Diet     Constipation    Other       Could NOT obtain due to:      Objective:     VITALS:   Last 24hrs VS reviewed since prior progress note. Most recent are:  Patient Vitals for the past 24 hrs:   Temp Pulse Resp BP SpO2   07/07/17 1529 97.9 °F (36.6 °C) 82 16 137/68 95 %   07/07/17 1129 97.5 °F (36.4 °C) 83 18 119/60 95 %   07/07/17 0802 97.6 °F (36.4 °C) 81 20 149/81 99 %   07/07/17 0318 97.5 °F (36.4 °C) 70 18 151/71 100 %   07/06/17 2237 97.8 °F (36.6 °C) 82 18 146/73 95 %   07/06/17 1951 97.6 °F (36.4 °C) 78 18 129/64 97 %       Intake/Output Summary (Last 24 hours) at 07/07/17 1707  Last data filed at 07/07/17 0930   Gross per 24 hour   Intake           871.29 ml   Output                0 ml   Net           871.29 ml        PHYSICAL EXAM:  General: WD, WN. Alert, cooperative, no acute distress    EENT:  EOMI. Anicteric sclerae. MMM  Resp:  Coarse breath sounds in apex with decreased breath sounds at bases, no wheezing or rales. No accessory muscle use  CV:  Regular  rhythm,  trace of pitting edema  GI:  Soft, Non distended, Non tender.  +Bowel sounds  Neurologic:  Alert and orient x 4  Psych:   Good insight. Not anxious nor agitated  Skin:  Diffuse erythema in lower extremities, denies pain. No jaundice    Reviewed most current lab test results and cultures  YES  Reviewed most current radiology test results   YES  Review and summation of old records today    NO  Reviewed patient's current orders and MAR    YES  PMH/SH reviewed - no change compared to H&P  ________________________________________________________________________  Care Plan discussed with:    Comments   Patient y    Family  y    RN y    Care Manager y    Consultant  y cardiology                    y Multidiciplinary team rounds were held today with , nursing, pharmacist and clinical coordinator.   Patient's plan of care was discussed; medications were reviewed and discharge planning was addressed. ________________________________________________________________________  Total NON critical care TIME:  30  Minutes    Total CRITICAL CARE TIME Spent:   Minutes non procedure based      Comments   >50% of visit spent in counseling and coordination of care     ________________________________________________________________________  Rylie Amor NP     Procedures: see electronic medical records for all procedures/Xrays and details which were not copied into this note but were reviewed prior to creation of Plan. LABS:  I reviewed today's most current labs and imaging studies.   Pertinent labs include:  Recent Labs      07/07/17 0241 07/06/17 0216 07/05/17 0241   WBC  9.6  9.3  8.6   HGB  11.6*  12.2  11.9*   HCT  34.6*  36.4*  35.7*   PLT  206  226  193     Recent Labs      07/07/17 0241 07/06/17 0216 07/05/17 0241   NA  137  135*  136   K  3.8  3.1*  3.1*   CL  101  101  100   CO2  29  26  29   GLU  91  91  95   BUN  8  6  8   CREA  0.75  0.64*  0.58*   CA  7.4*  7.6*  7.3*   MG  2.1   --    --    PHOS  4.7  2.8   --        Signed: )Riccardo Green NP

## 2017-07-08 VITALS
HEIGHT: 72 IN | RESPIRATION RATE: 18 BRPM | WEIGHT: 195.55 LBS | BODY MASS INDEX: 26.49 KG/M2 | SYSTOLIC BLOOD PRESSURE: 149 MMHG | DIASTOLIC BLOOD PRESSURE: 75 MMHG | TEMPERATURE: 97.9 F | OXYGEN SATURATION: 96 % | HEART RATE: 89 BPM

## 2017-07-08 PROBLEM — N39.0 E-COLI UTI: Status: ACTIVE | Noted: 2017-07-08

## 2017-07-08 PROBLEM — A41.9 SEPSIS DUE TO PNEUMONIA (HCC): Status: ACTIVE | Noted: 2017-07-08

## 2017-07-08 PROBLEM — J18.9 SEPSIS DUE TO PNEUMONIA (HCC): Status: ACTIVE | Noted: 2017-07-08

## 2017-07-08 PROBLEM — I10 HYPERTENSION: Status: ACTIVE | Noted: 2017-07-08

## 2017-07-08 PROBLEM — B96.20 E-COLI UTI: Status: ACTIVE | Noted: 2017-07-08

## 2017-07-08 PROBLEM — E87.8 ELECTROLYTE ABNORMALITY: Status: ACTIVE | Noted: 2017-07-08

## 2017-07-08 PROBLEM — K56.2 SIGMOID VOLVULUS (HCC): Status: ACTIVE | Noted: 2017-07-08

## 2017-07-08 PROBLEM — I48.91 A-FIB (HCC): Status: ACTIVE | Noted: 2017-07-08

## 2017-07-08 LAB
ANION GAP BLD CALC-SCNC: 8 MMOL/L (ref 5–15)
BUN SERPL-MCNC: 7 MG/DL (ref 6–20)
BUN/CREAT SERPL: 10 (ref 12–20)
CALCIUM SERPL-MCNC: 7.9 MG/DL (ref 8.5–10.1)
CHLORIDE SERPL-SCNC: 100 MMOL/L (ref 97–108)
CO2 SERPL-SCNC: 26 MMOL/L (ref 21–32)
CREAT SERPL-MCNC: 0.71 MG/DL (ref 0.7–1.3)
GLUCOSE SERPL-MCNC: 94 MG/DL (ref 65–100)
POTASSIUM SERPL-SCNC: 3.5 MMOL/L (ref 3.5–5.1)
SODIUM SERPL-SCNC: 134 MMOL/L (ref 136–145)

## 2017-07-08 PROCEDURE — 74011000258 HC RX REV CODE- 258: Performed by: INTERNAL MEDICINE

## 2017-07-08 PROCEDURE — 74011250637 HC RX REV CODE- 250/637: Performed by: FAMILY MEDICINE

## 2017-07-08 PROCEDURE — 74011250637 HC RX REV CODE- 250/637: Performed by: INTERNAL MEDICINE

## 2017-07-08 PROCEDURE — 36415 COLL VENOUS BLD VENIPUNCTURE: CPT | Performed by: NURSE PRACTITIONER

## 2017-07-08 PROCEDURE — 74011250636 HC RX REV CODE- 250/636: Performed by: INTERNAL MEDICINE

## 2017-07-08 PROCEDURE — 80048 BASIC METABOLIC PNL TOTAL CA: CPT | Performed by: NURSE PRACTITIONER

## 2017-07-08 PROCEDURE — 74011250637 HC RX REV CODE- 250/637: Performed by: NURSE PRACTITIONER

## 2017-07-08 RX ORDER — BENZONATATE 100 MG/1
100 CAPSULE ORAL
Qty: 15 CAP | Refills: 0 | Status: ON HOLD | OUTPATIENT
Start: 2017-07-08 | End: 2017-07-19

## 2017-07-08 RX ORDER — POLYETHYLENE GLYCOL 3350 17 G/17G
17 POWDER, FOR SOLUTION ORAL DAILY
Qty: 30 PACKET | Refills: 0 | Status: SHIPPED | OUTPATIENT
Start: 2017-07-08

## 2017-07-08 RX ORDER — POTASSIUM CHLORIDE 750 MG/1
20 TABLET, FILM COATED, EXTENDED RELEASE ORAL
Status: COMPLETED | OUTPATIENT
Start: 2017-07-08 | End: 2017-07-08

## 2017-07-08 RX ORDER — DOXYCYCLINE HYCLATE 100 MG
100 TABLET ORAL EVERY 12 HOURS
Qty: 4 TAB | Refills: 0 | Status: SHIPPED | OUTPATIENT
Start: 2017-07-08 | End: 2017-07-19

## 2017-07-08 RX ORDER — GUAIFENESIN/DEXTROMETHORPHAN 100-10MG/5
10 SYRUP ORAL
Qty: 1 BOTTLE | Refills: 0 | Status: ON HOLD | OUTPATIENT
Start: 2017-07-08 | End: 2017-07-19

## 2017-07-08 RX ADMIN — AMIODARONE HYDROCHLORIDE 200 MG: 200 TABLET ORAL at 09:27

## 2017-07-08 RX ADMIN — DOXYCYCLINE HYCLATE 100 MG: 100 TABLET, COATED ORAL at 09:27

## 2017-07-08 RX ADMIN — ENOXAPARIN SODIUM 40 MG: 40 INJECTION SUBCUTANEOUS at 09:27

## 2017-07-08 RX ADMIN — Medication 30 ML: at 05:14

## 2017-07-08 RX ADMIN — METOPROLOL SUCCINATE 25 MG: 25 TABLET, EXTENDED RELEASE ORAL at 09:26

## 2017-07-08 RX ADMIN — POLYETHYLENE GLYCOL 3350 17 G: 17 POWDER, FOR SOLUTION ORAL at 09:27

## 2017-07-08 RX ADMIN — POTASSIUM CHLORIDE 20 MEQ: 750 TABLET, FILM COATED, EXTENDED RELEASE ORAL at 09:27

## 2017-07-08 RX ADMIN — PANTOPRAZOLE SODIUM 40 MG: 40 TABLET, DELAYED RELEASE ORAL at 09:26

## 2017-07-08 RX ADMIN — GUAIFENESIN AND DEXTROMETHORPHAN 10 ML: 100; 10 SYRUP ORAL at 10:33

## 2017-07-08 RX ADMIN — CEFTRIAXONE 1 G: 1 INJECTION, POWDER, FOR SOLUTION INTRAMUSCULAR; INTRAVENOUS at 09:27

## 2017-07-08 RX ADMIN — ASPIRIN 81 MG 81 MG: 81 TABLET ORAL at 09:27

## 2017-07-08 NOTE — PROGRESS NOTES
Cardiology Progress Note  7/8/2017    Admit Date: 7/1/2017  Admit Diagnosis: Respiratory failure ([facility]  colon decompression    Usual cardiologist:  Cody Shanks MD     Assessment: 1. Paroxysmal atrial fibrillation. RVR requiring rate control. Not on OP anticoagulation. 2. History of paroxysmal SVT converted with adenosine in 11/2016. 3. Chronic left bundle branch block. 4. 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 (Placed by Dr. Brown Mackenzie with our practice). 5. Severe colonic distension with sigmoid volvulus, improving. S/p decompression. No surgery needed. 6. Acute respiratory failure. Intubated and now extubated. 7. Hypokalemia, resolved. 8. Chronic ASA therapy.     Plan:      1. Rate control Afib paroxysms with metoprolol ER 25 bid. Will try not to use diltiazem which can cause constipation at higher dose. 2. Continue low dose amiodarone, changed to oral 200 bid here. 3. Not an immediate anticoagulation candidate. Continue ASA. 4. Will follow-up on pacemaker data from the past to see what the burden of Afib has been before (though that won't change immediate plan). He definitely had an adenosine-sensitive SVT in 2016.  5. Azithromycin and ciprofloxacin can prolong QT along with amiodarone.     If BP OK tomorrow, then can be discharged on metoprolol ER 25 bid, amiodarone 200 bid x 14 days, then 200 daily x 14 days. THESE SCRIPTS ARE ON THE CHART. To the hospitalist team:  Reviewed with hospitalist, they will use doxycyline as abx, which should have minimal effect on Qt.      For other plans, see orders.   High complexity decision making was performed  X Yes   High-risk of decompensation with multiple organ involvement X Yes   Hospital problem list   Active Hospital Problems    Diagnosis Date Noted    Sepsis due to pneumonia ([facility] [address] 07/08/2017    E-coli UTI 07/08/2017    Sigmoid volvulus ([facility] [address] [address] 07/08/2017    A-fib ([facility] 07/08/2017    Electrolyte abnormality 2017    Hypertension 2017    Respiratory failure ([facility] [address] 2017                                                         Subjective:  Patient reports  []   nothing; unable to communicate    []   intubated   Chest pain X none  consistent with:  Non-cardiac CP         Atypical CP     None now  On going  Anginal CP     Dyspnea X none  at rest  with exertion         improved  unchanged  worse              PND X none  overnight       Orthopnea X none  improved  unchanged  worse   Presyncope X none  improved  unchanged  worse   Ambulated in hallway without symptoms   Yes   Ambulated in room without symptoms  Yes     ROS Hematuria:  Yes X No Dysuria:  Yes X No                (2+  other systems) Cough: Yes X No Sputum: Yes X No                 BRBPR:  Yes X No Melena: Yes X No   No change in family and social history from H&P/Consult note.   Objective:    Physical Exam:  24 hr VS reviewed, overall VSSAF  Temp (24hrs), Av.8 °F (36.6 °C), Min:97.5 °F (36.4 °C), Max:98.2 °F (36.8 °C)    Patient Vitals for the past 8 hrs:   Pulse   17 0751 79   17 0208 85    Patient Vitals for the past 8 hrs:   Resp   17 0751 18   17 0208 22    Patient Vitals for the past 8 hrs:   BP   17 0751 140/71   17 0208 153/83          Intake/Output Summary (Last 24 hours) at 17 0213  Last data filed at 17 0617   Gross per 24 hour   Intake              125 ml   Output              150 ml   Net              -25 ml     General: x WD,WN  Elderly  Cachetic x NAD     Agitated  Lethargic  Arousable  Obtunded     Sedated  On Bipap  Intubated                ENT/Palate: X WNL  Dry MM  anicteric                Respiratory:  CTA  Nl resp effort  Increased effort  No significant change    x scattered rhonchi  rales  improved  worse              Cardiovasc: X RRR  IRRR X Nl S1, S2 x No rub     No murmur X No new murmur  Murmur c/w: x No gallop    x No edema  BLE edema:+  RLE edema:+  LLE edema:+     Edema less  Edema more  Edema same  Edema worse    X Nl JVP  Elevated JVP  JVP same  JVP worse    X Carotid wnl x abd aorta not palpated X no peripheral emboli noted                GI: X abd soft x nondistended X BS present X No organo- megaly noted              Skin: X Warm, dry  Cold extremites                  Neuro: x A/O x Grossly non- focal  Obtunded  Sedated     Lethargic  Arousable  intubated       cath site intact w/o hematoma or new bruit; distal pulse unchanged  Yes   Data Review:     Telemetry independently reviewed x sinus x V paced  parox afib  NSVT     ECG independently reviewed  NSR  afib  no significant changes  NSST-Tw chgs   x no new ECG provided for review   Lab results reviewed as noted below. Current medications reviewed as noted below. No results for input(s): PH, PCO2, PO2 in the last 72 hours. No results for input(s): CPK, CKMB in the last 72 hours. No lab exists for component: TROPONINI  Recent Labs      07/08/17   0210  07/07/17   0241  07/06/17   0216   NA  134*  137  135*   K  3.5  3.8  3.1*   CL  100  101  101   CO2  26  29  26   BUN  7  8  6   CREA  0.71  0.75  0.64*   GLU  94  91  91   PHOS   --   4.7  2.8   CA  7.9*  7.4*  7.6*   WBC   --   9.6  9.3   HGB   --   11.6*  12.2   HCT   --   34.6*  36.4*   PLT   --   206  226     No results for input(s): SGOT, GPT, AP, TBIL, TBILI, TP, ALB, GLOB, GGT, AML, LPSE in the last 72 hours. No lab exists for component: AMYP, HLPSE  No results for input(s): INR, PTP, APTT in the last 72 hours. No lab exists for component: INREXT, INREXT   No results for input(s): FE, TIBC, PSAT, FERR in the last 72 hours.    No results found for: GLUCPOC    Current Facility-Administered Medications   Medication Dose Route Frequency    potassium chloride SR (KLOR-CON 10) tablet 20 mEq  20 mEq Oral NOW    amiodarone (CORDARONE) tablet 200 mg  200 mg Oral BID    metoprolol succinate (TOPROL-XL) XL tablet 25 mg  25 mg Oral BID  doxycycline (VIBRA-TABS) tablet 100 mg  100 mg Oral Q12H    aspirin chewable tablet 81 mg  81 mg Oral DAILY    pantoprazole (PROTONIX) tablet 40 mg  40 mg Oral ACB    cefTRIAXone (ROCEPHIN) 1 g in 0.9% sodium chloride (MBP/ADV) 50 mL  1 g IntraVENous Q24H    polyethylene glycol (MIRALAX) packet 17 g  17 g Oral DAILY    enoxaparin (LOVENOX) injection 40 mg  40 mg SubCUTAneous Q24H    sodium chloride (NS) flush 10-30 mL  10-30 mL InterCATHeter PRN    sodium chloride (NS) flush 10 mL  10 mL InterCATHeter Q24H    sodium chloride (NS) flush 10 mL  10 mL InterCATHeter PRN    sodium chloride (NS) flush 10-40 mL  10-40 mL InterCATHeter Q8H    sodium chloride (NS) flush 20 mL  20 mL InterCATHeter PRN    heparin (porcine) pf 300 Units  300 Units InterCATHeter PRN    LORazepam (ATIVAN) injection 0.5 mg  0.5 mg IntraVENous Q4H PRN    benzonatate (TESSALON) capsule 100 mg  100 mg Oral Q8H PRN    guaiFENesin-dextromethorphan (ROBITUSSIN DM) 100-10 mg/5 mL syrup 10 mL  10 mL Oral Q6H PRN    sodium chloride (NS) flush 5-10 mL  5-10 mL IntraVENous PRN    acetaminophen (TYLENOL) tablet 650 mg  650 mg Oral Q4H PRN    ondansetron (ZOFRAN) injection 4 mg  4 mg IntraVENous Q4H PRN    hydrALAZINE (APRESOLINE) 20 mg/mL injection 20 mg  20 mg IntraVENous Q6H PRN    zinc oxide-cod liver oil (DESITIN) 40 % paste   Topical PRN         Abby Muhammad MD

## 2017-07-08 NOTE — DISCHARGE INSTRUCTIONS
Patient Discharge Instructions     Pt Name  Stefan Gonzalez   Date of Birth 1940   Age  78 y.o. Medical Record Number  [id number]   PCP Fredrica Hodgkins, MD    Admit date:  2017 @    [place] [address]    Room Number  2203/01   Date of Discharge 2017     Admission Diagnoses:     Sepsis due to pneumonia ([facility] [address]          Allergies   Allergen Reactions    Augmentin [Amoxicillin-Pot Clavulanate] Rash    Ciprofloxacin Rash        You were admitted to [address] for  Sepsis due to pneumonia ([address]    [place] (BUT NOT LIMITED TO ):  Present on Admission:   Respiratory failure ([address]   Sepsis due to pneumonia ([facility] [address]   E-coli UTI   Sigmoid volvulus ([facility] [address]   A-fib ([facility] [address]   Electrolyte abnormality   Hypertension  MyChart Activation    Thank you for requesting access to [employer]. Please follow the instructions below to securely access and download your online medical record. [employer] allows you to send messages to your doctor, view your test results, renew your prescriptions, schedule appointments, and more. How Do I Sign Up? 1. In your internet browser, go to [email or web address]  2. Click on the First Time User? Click Here link in the Sign In box. You will be redirect to the New Member Sign Up page. 3. Enter your [employer] Access Code exactly as it appears below. You will not need to use this code after youve completed the sign-up process. If you do not sign up before the expiration date, you must request a new code. [employer] Access Code: [email or web address]  Expires: 2017 11:57 PM (This is the date your [employer] access code will )    4. Enter the last four digits of your Social Security Number (xxxx) and Date of Birth (mm/dd/yyy) as indicated and click Submit. You will be taken to the next sign-up page. 5. Create a [employer] ID.  This will be your [employer] login ID and cannot be changed, so think of one that is secure and easy to remember. 6. Create a [employer] password. You can change your password at any time. 7. Enter your Password Reset Question and Answer. This can be used at a later time if you forget your password. 8. Enter your e-mail address. You will receive e-mail notification when new information is available in [address] 9. Click Sign Up. You can now view and download portions of your medical record. 10. Click the Download Summary menu link to download a portable copy of your medical information. Additional Information    If you have questions, please visit the Frequently Asked Questions section of the [employer] website at [email or web address] Remember, [employer] is NOT to be used for urgent needs. For medical emergencies, dial 911. DIET:  Cardiac Diet     Recommended activity: Activity as tolerated  Follow up :    Follow-up Information     Follow up With Details Comments [email or web address] III, MD On 7/11/2017 12:45pm  hospital follow-up [facility] 1690  1798 [facility]      Danna Mckenna MD On 7/10/2017 8:30am  hospital follow-up 15[address] [facility]  [facility]   [address]  1[address] Floor  [facility] 64 [facility]   1800 [facility] 12 [facility]  [phone]    Salomón Brandt NP On 7/17/2017 8:45am  hospital follow-up-Cardiologist [address]  [phone]      Julia Knox MD Schedule an appointment as soon as possible for a visit to be seen within 2 weeks [address] [facility] 700  1500 [facility]      Nathaniel Galvan MD Schedule an appointment as soon as possible for a visit to be seen within 1-2 weeks 1808 [facility]  [facility]  [address] [place] [address]  [phone]              · It is important that you take the medication exactly as they are prescribed. · Keep your medication in the bottles provided by the pharmacist and keep a list of the medication names, dosages, and times to be taken in your wallet. · Do not take other medications without consulting your doctor. ADDITIONAL INFORMATION: If you experience any of the following symptoms or have any health problem not listed below, then please call your primary care physician or return to the emergency room if you cannot get hold of your doctor: Fever, chills, nausea, vomiting, diarrhea, change in mentation, falling, bleeding, shortness of breath. I understand that if any problems occur once I am discharged, I am supposed to call my Primary care physician for further care or seek help in the Emergency Department at the nearest Healthcare facility. I have had an opportunity to discuss my clinical issues with my doctor and nursing staff. I understand and acknowledge receipt of the above instructions.                                                                                                                                            Physician's or R.N.'s Signature                                                            Date/Time                                                                                                                                              Patient or Representative Signature                                                 Date/Time

## 2017-07-08 NOTE — DISCHARGE SUMMARY
Hospitalist Discharge Summary     Patient ID:  Danuta Garcia  [id number]  89 y.o.  1940    PCP on record: Marbella Mendez MD    Admit date: 7/1/2017  Discharge date and time: 7/8/2017      DISCHARGE DIAGNOSIS:    Severe sepsis POA  Acute respiratory failure with hypoxia POA requiring intubation, now extubated  RLL community acquired pneumonia POA  E coli UTI POA pan sensitive  Sigmoid volvulus with proximal colonic obstruction, severe distention POA  Afib with RVR overnight  Hypokalemia POA  Hypophosphatemia POA  Essential Hypertension POA  Unspecified kidney disease        CONSULTATIONS:  IP CONSULT TO GASTROENTEROLOGY  IP CONSULT TO GENERAL SURGERY  IP CONSULT TO CARDIOLOGY    Excerpted HPI from H&P of Sheila Powell MD:  Danuta Garcia is a 78 y.o.  male with history of CKD, per family patient is on prednisone chronically for his kidney disease, pacemaker for history of arrhythmia, hypertension who presents to ED with worsening abdominal pain and confusion. Patient has had abdominal pain and distention for last 2 days. Also recently prescribed cipro for UTI. Presented to ED yesterday, was found to have significant colon distention and fecal stasis. His pain was improved and was discharged home with mag citrate, plan for enema, and norco. Patient returns today with new altered mental status, more confused needing more assistance,, loss of appetite and persistent abdominal pain. Family also noted some wheezing earlier in the day. No fevers or chills. Patient had been alert and oriented in ED, after returning from CT scan patient was much more confused, his face was purple and patient was emergently intubated.     ______________________________________________________________________  DISCHARGE SUMMARY/HOSPITAL COURSE:  for full details see H&P, daily progress notes, labs, consult notes.      Severe sepsis POA  Acute respiratory failure with hypoxia POA requiring intubation on 7/1, now extubated on 7/4  RLL community acquired pneumonia POA  E coli UTI POA pan sensitive  - WBC 9.6, lactate down to 0.6 from 2.4  - CXR (7/4): Increased airspace disease in the right base  - Received IV ceftriaxone and azithromycin. Pt will complete total 10 days of ABT with doxycycline.  - Blood cultures NTD, urine culture showed E. coli on 6/30/17 which was treated 7 days IV rocephin.  - Weaned off O2; room air O2 Sat 95%. However, he experienced respiratory distress during the night which resulted decrease of O2 Sat 88% in room air. O2 Sat recovered back to greater than 90% with 2L of O2 supplement. The similar episode happens with activity. Arranged for him to have Home O2 at night time and with activity prn.      Sigmoid volvulus with proximal colonic obstruction, severe distention POA  - S/P emergent colonic decompression tube 7/3, now out  - No signs of perforation or ischemia on CT  - Pt is tolerating regular diet without difficulty  - Repeat KUB 7/5 with significantly improved distension  - GI signed off on 7/6; tolerating cardiac diet. Pt to go home with linzess at discharge  - Outpatient GI follow up      Afib with RVR overnight  - Cardiology following; pt will complete BB 25mg Bid and continue with amio 200mg twice a day  - currently stays in NSR      Hypokalemia POA  Hypophosphatemia POA  - resolved     Essential Hypertension POA  - continue with BB  - Hydralazine PRN      Unspecified kidney disease  - Family unclear of exact diagnosis but know that patient has been taking prednisone for the last year and is currently being tapered to 10 mg daily      Body mass index is 26.52 kg/(m^2).           _______________________________________________________________________  Patient seen and examined by me on discharge day. Pertinent Findings:  Gen:    Not in distress  Chest: Clear in apex with decreased breath sounds at bases  CVS:   Regular rhythm.   No edema  Abd:  Soft, not distended, not tender  Neuro: Alert, orient x 4  _______________________________________________________________________  DISCHARGE MEDICATIONS:   Current Discharge Medication List      START taking these medications    Details   polyethylene glycol (MIRALAX) 17 gram packet Take 1 Packet by mouth daily. Qty: 30 Packet, Refills: 0      guaiFENesin-dextromethorphan (ROBITUSSIN DM) 100-10 mg/5 mL syrup Take 10 mL by mouth every six (6) hours as needed for up to 10 days. Qty: 1 Bottle, Refills: 0      doxycycline (VIBRA-TABS) 100 mg tablet Take 1 Tab by mouth every twelve (12) hours. Qty: 4 Tab, Refills: 0      benzonatate (TESSALON) 100 mg capsule Take 1 Cap by mouth every eight (8) hours as needed for Cough for up to 7 days. Qty: 15 Cap, Refills: 0      l.acidoph-B.lactis-B.longum (FLORAJEN3) 460 mg (7.5-6- 1.5 bill. cell) cap cap Take 1 Cap by mouth Daily (before breakfast). Qty: 2 Cap, Refills: 0      linaclotide (LINZESS) 145 mcg cap capsule Take 1 Cap by mouth Daily (before breakfast). Qty: 30 Cap, Refills: 0      amiodarone (CORDARONE) 200 mg tablet Take 1 Tab by mouth two (2) times a day for 14 days. Then, starting on 7/23, decrease dose to 200 mg by mouth daily in the morning for 14 more days. (28 days total therapy)  Qty: 42 Tab, Refills: 0         CONTINUE these medications which have CHANGED    Details   metoprolol succinate (TOPROL-XL) 50 mg XL tablet Take 0.5 Tabs by mouth two (2) times a day for 30 days. Qty: 30 Tab, Refills: 2         CONTINUE these medications which have NOT CHANGED    Details   aspirin 81 mg chewable tablet Take 81 mg by mouth daily. pregabalin (LYRICA) 100 mg capsule Take  by mouth two (2) times a day. HYDROcodone-acetaminophen (NORCO) 5-325 mg per tablet Take 1 Tab by mouth every six (6) hours as needed for Pain. Max Daily Amount: 4 Tabs. Qty: 10 Tab, Refills: 0      omeprazole (PRILOSEC) 20 mg capsule Take 20 mg by mouth daily.         LORazepam (ATIVAN) 1 mg tablet Take  by mouth every four (4) hours as needed. tamsulosin (FLOMAX) 0.4 mg capsule Take 0.4 mg by mouth daily. finasteride (PROSCAR) 5 mg tablet Take 5 mg by mouth daily. STOP taking these medications       ciprofloxacin HCl (CIPRO) 500 mg tablet Comments:   Reason for Stopping:         dilTIAZem XR (DILACOR XR) 120 mg XR capsule Comments:   Reason for Stopping:         magnesium citrate solution Comments:   Reason for Stopping:         predniSONE (DELTASONE) 20 mg tablet Comments:   Reason for Stopping:         amLODIPine (NORVASC) 5 mg tablet Comments:   Reason for Stopping:               My Recommended Diet, Activity, Wound Care, and follow-up labs are listed in the patient's Discharge Insturctions which I have personally completed and reviewed.     _______________________________________________________________________  DISPOSITION:    Home with Family:    Home with HH/PT/OT/RN: y   SNF/LTC:    CATAL[place]:    OTHER:        Condition at Discharge:  Stable  _______________________________________________________________________  Follow up with:   PCP : Malaika Rebollar MD  Follow-up Information     Follow up With Details LILLIANA/ Phill Stearns III, MD On 7/11/2017 12:45pm  hospital follow-up [address] [facility] 7412 7[facility]      Desean Schafer MD On 7/10/2017 8:30am  hospital follow-up [address] [address]  [facility]   1800 [facility] [address] 500 [facility]    Sera Brink NP On 7/17/2017 8:45am  hospital follow-up-Cardiologist [address]  [phone]      Ibeth Trivedi MD Schedule an appointment as soon as possible for a visit to be seen within 2 weeks [address] [facility] 700  1689 [facility]      Mai Barnes MD Schedule an appointment as soon as possible for a visit to be seen within 1-2 weeks 0979 [facility]  [address]  [phone]                Total time in minutes spent coordinating this discharge (includes going over instructions, follow-up, prescriptions, and preparing report for sign off to her PCP) :  35 minutes    Signed:  Riccardo Da Silva NP

## 2017-07-08 NOTE — PROGRESS NOTES
CM noted consult for home oxygen. CM called [facility] and informed them of the oxygen referral and also sent the referral by [employer]scripts. [facility] accepted pt and will deliver the oxygen to the patient's home today. CM informed pt's nurse regarding delivery of oxygen.      Rodo Self, [address]

## 2017-07-08 NOTE — PROGRESS NOTES
Report received from Darroll Cockayne , [address] [place] [address]. SBAR were discussed.     Kely Bazzi RN

## 2017-07-08 NOTE — PROGRESS NOTES
Notified NP Anat Mendoza that patient had respiratory distress after going to the bathroom last night. He sat in the bed to go to sleep and could not rest due to sob. Nurse stated he \"begged \" for 02. At that time the nurse said his sats were 88% on RA. 02 was placed on patient 2 lpm and patient rested.

## 2017-07-08 NOTE — PROGRESS NOTES
[facility]            Pt Name  Dottie Triana   Date of Birth 1940   Age  78 y.o. Medical Record Number  [id number]   Room Number  2203/01   Admit date:  7/1/2017   Date of Face to Face:  7/8/2017     Admission Diagnosis:  Sepsis due to pneumonia [facility])     Diagnoses Present on Admission:   Respiratory failure ([facility]   Sepsis due to pneumonia ([facility]   E-coli UTI   Sigmoid volvulus ([facility]   A-fib ([facility]   Electrolyte abnormality   Hypertension     Past Medical History:   Diagnosis Date    Chronic kidney disease     Pacemaker 2013      Visit Vitals    /83 (BP 1 Location: Left arm, BP Patient Position: Post activity)    Pulse 85    Temp 97.6 °F (36.4 °C)    Resp 22    Ht 6' (1.829 m)    Wt 88.7 kg (195 lb 8.8 oz)    SpO2 93%    BMI 26.52 kg/m2           Allergies   Allergen Reactions    Augmentin [Amoxicillin-Pot Clavulanate] Rash    Ciprofloxacin Rash             I certify that the patient needs home health care as prescribed below and I will not be following this patient in the Community.  I also certify that the patient is homebound as evidenced by    - Patient with increased shortness of breath and elevated heart rate with ambulation greater than 20 feet limiting patient's ability to ambulate safely within the community.  - Patient with strength deficits limiting the performance of all ADL's without caregiver assistance or the use of an assistive device. - Requires considerable and taxing effort to leave the home    - and also as noted in various sections of this medical record.  Dr. Dominic Galvez MD  will be responsible for signing the Plan of Care and will follow/coordinate ongoing care.      Initial Orders for Care:  - skilled nursing care:  skilled observation/assessment, patient education and wound care  - physical therapy: strengthening, stretching/ROM, transfer training, gait/stair training and balance training  - occupational therapy:  ADL safety (ie. cooking, bathing, dressing), ROM and pt/caregiver education  - The patient and the family have been explained about the medical necessities of oxygen supplement. Patient experienced desaturation as low as 88% at rest and exertion that required O2 supplement to recover. - Report to Madina Shine MD     I certify that I am taking care of the patient today (Please see hospital Discharge Records for details of clinical issues pertaining to this order).       ________________________________________________________________________  Theodore Conley, YAMIL, FNP-C, APRN-BC  (electronically signed; no signature required )    Hospitalist, [address], [facility], [address]  Tel: [phone]

## 2017-07-08 NOTE — PROGRESS NOTES
02 standing after going in rest room 92%  With walking it went to 89% and then came up to 93% when finished walking. When patient sat on bed and turned to lie down he became very sob. His sats on RA were 93% but he was in distress. He was unsure if he was anxious because he thought he might not be able to breathe or if he was anxious because he was not able to breath right.  It resolved in about a minute and HOB was elevated 50%

## 2017-07-08 NOTE — PROGRESS NOTES
Patient sats 95% on room air at rest  Patients sats 92% on room air during ambulation   Patient's sats 93% on 2 lpm at rest  Patient's sats 93% on 2 lpm during ambulation

## 2017-07-08 NOTE — PROGRESS NOTES
Please arrange for Home Oxygen at 2 Liters/ min via Nasal Canula to be dispensed to use continuously portability through out the day due to hypoxia complicated by pneumonia and A-fib. Patent's O2 Sat decreases to 88% with exertion and rest in room air and the symptoms resolved with O2 at 2 L via n/c.

## 2017-07-09 ENCOUNTER — HOME CARE VISIT (OUTPATIENT)
Dept: SCHEDULING | Facility: HOME HEALTH | Age: 77
End: 2017-07-09
Payer: MEDICARE

## 2017-07-09 PROCEDURE — G0299 HHS/HOSPICE OF RN EA 15 MIN: HCPCS

## 2017-07-09 PROCEDURE — 3331090002 HH PPS REVENUE DEBIT

## 2017-07-09 PROCEDURE — 3331090001 HH PPS REVENUE CREDIT

## 2017-07-09 PROCEDURE — 400013 HH SOC

## 2017-07-10 ENCOUNTER — HOME CARE VISIT (OUTPATIENT)
Dept: SCHEDULING | Facility: HOME HEALTH | Age: 77
End: 2017-07-10
Payer: MEDICARE

## 2017-07-10 VITALS
HEART RATE: 72 BPM | DIASTOLIC BLOOD PRESSURE: 60 MMHG | OXYGEN SATURATION: 96 % | BODY MASS INDEX: 26.68 KG/M2 | RESPIRATION RATE: 22 BRPM | HEIGHT: 72 IN | TEMPERATURE: 98.3 F | SYSTOLIC BLOOD PRESSURE: 114 MMHG | WEIGHT: 197 LBS

## 2017-07-10 PROCEDURE — 3331090001 HH PPS REVENUE CREDIT

## 2017-07-10 PROCEDURE — G0151 HHCP-SERV OF PT,EA 15 MIN: HCPCS

## 2017-07-10 PROCEDURE — 3331090002 HH PPS REVENUE DEBIT

## 2017-07-11 PROCEDURE — 3331090001 HH PPS REVENUE CREDIT

## 2017-07-11 PROCEDURE — 3331090002 HH PPS REVENUE DEBIT

## 2017-07-12 ENCOUNTER — HOME CARE VISIT (OUTPATIENT)
Dept: SCHEDULING | Facility: HOME HEALTH | Age: 77
End: 2017-07-12
Payer: MEDICARE

## 2017-07-12 VITALS
OXYGEN SATURATION: 97 % | DIASTOLIC BLOOD PRESSURE: 60 MMHG | TEMPERATURE: 97.5 F | HEART RATE: 79 BPM | SYSTOLIC BLOOD PRESSURE: 122 MMHG

## 2017-07-12 PROCEDURE — 3331090002 HH PPS REVENUE DEBIT

## 2017-07-12 PROCEDURE — G0151 HHCP-SERV OF PT,EA 15 MIN: HCPCS

## 2017-07-12 PROCEDURE — 3331090001 HH PPS REVENUE CREDIT

## 2017-07-13 ENCOUNTER — HOME CARE VISIT (OUTPATIENT)
Dept: HOME HEALTH SERVICES | Facility: HOME HEALTH | Age: 77
End: 2017-07-13
Payer: MEDICARE

## 2017-07-13 VITALS
HEART RATE: 88 BPM | SYSTOLIC BLOOD PRESSURE: 110 MMHG | DIASTOLIC BLOOD PRESSURE: 58 MMHG | TEMPERATURE: 97.7 F | OXYGEN SATURATION: 96 %

## 2017-07-13 PROCEDURE — 3331090001 HH PPS REVENUE CREDIT

## 2017-07-13 PROCEDURE — 3331090002 HH PPS REVENUE DEBIT

## 2017-07-14 ENCOUNTER — HOME CARE VISIT (OUTPATIENT)
Dept: HOME HEALTH SERVICES | Facility: HOME HEALTH | Age: 77
End: 2017-07-14
Payer: MEDICARE

## 2017-07-14 ENCOUNTER — APPOINTMENT (OUTPATIENT)
Dept: GENERAL RADIOLOGY | Age: 77
DRG: 393 | End: 2017-07-14
Attending: EMERGENCY MEDICINE
Payer: MEDICARE

## 2017-07-14 ENCOUNTER — APPOINTMENT (OUTPATIENT)
Dept: CT IMAGING | Age: 77
DRG: 393 | End: 2017-07-14
Attending: EMERGENCY MEDICINE
Payer: MEDICARE

## 2017-07-14 ENCOUNTER — APPOINTMENT (OUTPATIENT)
Dept: ULTRASOUND IMAGING | Age: 77
DRG: 393 | End: 2017-07-14
Attending: FAMILY MEDICINE
Payer: MEDICARE

## 2017-07-14 ENCOUNTER — HOSPITAL ENCOUNTER (INPATIENT)
Age: 77
LOS: 5 days | Discharge: HOME HEALTH | DRG: 393 | End: 2017-07-19
Attending: EMERGENCY MEDICINE | Admitting: FAMILY MEDICINE
Payer: MEDICARE

## 2017-07-14 DIAGNOSIS — K92.1 GASTROINTESTINAL HEMORRHAGE WITH MELENA: Primary | ICD-10-CM

## 2017-07-14 DIAGNOSIS — D62 ACUTE BLOOD LOSS ANEMIA: ICD-10-CM

## 2017-07-14 PROBLEM — K92.2 GI BLEED: Status: ACTIVE | Noted: 2017-07-14

## 2017-07-14 LAB
ALBUMIN SERPL BCP-MCNC: 1.9 G/DL (ref 3.5–5)
ALBUMIN/GLOB SERPL: 0.8 {RATIO} (ref 1.1–2.2)
ALP SERPL-CCNC: 30 U/L (ref 45–117)
ALT SERPL-CCNC: 10 U/L (ref 12–78)
ANION GAP BLD CALC-SCNC: 10 MMOL/L (ref 5–15)
ANION GAP BLD CALC-SCNC: 20 MMOL/L (ref 5–15)
ANION GAP BLD CALC-SCNC: 21 MMOL/L (ref 5–15)
ANION GAP BLD CALC-SCNC: 8 MMOL/L (ref 5–15)
AST SERPL W P-5'-P-CCNC: 7 U/L (ref 15–37)
BASOPHILS # BLD AUTO: 0.1 K/UL (ref 0–0.1)
BASOPHILS # BLD: 1 % (ref 0–1)
BILIRUB SERPL-MCNC: 0.4 MG/DL (ref 0.2–1)
BUN BLD-MCNC: 11 MG/DL (ref 9–20)
BUN BLD-MCNC: 12 MG/DL (ref 9–20)
BUN SERPL-MCNC: 12 MG/DL (ref 6–20)
BUN SERPL-MCNC: 13 MG/DL (ref 6–20)
BUN/CREAT SERPL: 13 (ref 12–20)
BUN/CREAT SERPL: 15 (ref 12–20)
CA-I BLD-MCNC: 0.97 MMOL/L (ref 1.12–1.32)
CA-I BLD-MCNC: 1 MMOL/L (ref 1.12–1.32)
CA-I BLD-SCNC: 0.93 MMOL/L (ref 1.13–1.32)
CALCIUM SERPL-MCNC: 6.3 MG/DL (ref 8.5–10.1)
CALCIUM SERPL-MCNC: 6.7 MG/DL (ref 8.5–10.1)
CHLORIDE BLD-SCNC: 104 MMOL/L (ref 98–107)
CHLORIDE BLD-SCNC: 99 MMOL/L (ref 98–107)
CHLORIDE SERPL-SCNC: 104 MMOL/L (ref 97–108)
CHLORIDE SERPL-SCNC: 105 MMOL/L (ref 97–108)
CO2 BLD-SCNC: 20 MMOL/L (ref 21–32)
CO2 BLD-SCNC: 23 MMOL/L (ref 21–32)
CO2 SERPL-SCNC: 24 MMOL/L (ref 21–32)
CO2 SERPL-SCNC: 24 MMOL/L (ref 21–32)
CREAT BLD-MCNC: 0.9 MG/DL (ref 0.6–1.3)
CREAT BLD-MCNC: 1 MG/DL (ref 0.6–1.3)
CREAT SERPL-MCNC: 0.78 MG/DL (ref 0.7–1.3)
CREAT SERPL-MCNC: 0.99 MG/DL (ref 0.7–1.3)
DIFFERENTIAL METHOD BLD: ABNORMAL
EOSINOPHIL # BLD: 0 K/UL (ref 0–0.4)
EOSINOPHIL NFR BLD: 0 % (ref 0–7)
ERYTHROCYTE [DISTWIDTH] IN BLOOD BY AUTOMATED COUNT: 13.4 % (ref 11.5–14.5)
GLOBULIN SER CALC-MCNC: 2.3 G/DL (ref 2–4)
GLUCOSE BLD-MCNC: 160 MG/DL (ref 65–100)
GLUCOSE BLD-MCNC: 86 MG/DL (ref 65–100)
GLUCOSE SERPL-MCNC: 159 MG/DL (ref 65–100)
GLUCOSE SERPL-MCNC: 89 MG/DL (ref 65–100)
HCT VFR BLD AUTO: 16.9 % (ref 36.6–50.3)
HCT VFR BLD AUTO: 18.9 % (ref 36.6–50.3)
HCT VFR BLD AUTO: 20.6 % (ref 36.6–50.3)
HCT VFR BLD AUTO: 21.6 % (ref 36.6–50.3)
HCT VFR BLD AUTO: 22.3 % (ref 36.6–50.3)
HCT VFR BLD CALC: 15 % (ref 36.6–50.3)
HCT VFR BLD CALC: 26 % (ref 36.6–50.3)
HGB BLD-MCNC: 5.1 GM/DL (ref 12.1–17)
HGB BLD-MCNC: 6 G/DL (ref 12.1–17)
HGB BLD-MCNC: 6.8 G/DL (ref 12.1–17)
HGB BLD-MCNC: 7.4 G/DL (ref 12.1–17)
HGB BLD-MCNC: 7.7 G/DL (ref 12.1–17)
HGB BLD-MCNC: 7.9 G/DL (ref 12.1–17)
HGB BLD-MCNC: 8.8 GM/DL (ref 12.1–17)
LACTATE SERPL-SCNC: 2.9 MMOL/L (ref 0.4–2)
LYMPHOCYTES # BLD AUTO: 29 % (ref 12–49)
LYMPHOCYTES # BLD: 2.8 K/UL (ref 0.8–3.5)
MCH RBC QN AUTO: 34.1 PG (ref 26–34)
MCHC RBC AUTO-ENTMCNC: 35.5 G/DL (ref 30–36.5)
MCV RBC AUTO: 96 FL (ref 80–99)
MONOCYTES # BLD: 0.6 K/UL (ref 0–1)
MONOCYTES NFR BLD AUTO: 6 % (ref 5–13)
NEUTS SEG # BLD: 6.2 K/UL (ref 1.8–8)
NEUTS SEG NFR BLD AUTO: 64 % (ref 32–75)
PLATELET # BLD AUTO: 330 K/UL (ref 150–400)
POTASSIUM BLD-SCNC: 2.4 MMOL/L (ref 3.5–5.1)
POTASSIUM BLD-SCNC: 2.8 MMOL/L (ref 3.5–5.1)
POTASSIUM SERPL-SCNC: 2.5 MMOL/L (ref 3.5–5.1)
POTASSIUM SERPL-SCNC: 2.8 MMOL/L (ref 3.5–5.1)
POTASSIUM SERPL-SCNC: 2.9 MMOL/L (ref 3.5–5.1)
PROT SERPL-MCNC: 4.2 G/DL (ref 6.4–8.2)
RBC # BLD AUTO: 1.76 M/UL (ref 4.1–5.7)
RBC MORPH BLD: ABNORMAL
RBC MORPH BLD: ABNORMAL
SERVICE CMNT-IMP: ABNORMAL
SERVICE CMNT-IMP: ABNORMAL
SODIUM BLD-SCNC: 138 MMOL/L (ref 136–145)
SODIUM BLD-SCNC: 141 MMOL/L (ref 136–145)
SODIUM SERPL-SCNC: 136 MMOL/L (ref 136–145)
SODIUM SERPL-SCNC: 139 MMOL/L (ref 136–145)
TROPONIN I SERPL-MCNC: <0.04 NG/ML
WBC # BLD AUTO: 9.7 K/UL (ref 4.1–11.1)

## 2017-07-14 PROCEDURE — 86900 BLOOD TYPING SEROLOGIC ABO: CPT | Performed by: EMERGENCY MEDICINE

## 2017-07-14 PROCEDURE — 93005 ELECTROCARDIOGRAM TRACING: CPT

## 2017-07-14 PROCEDURE — 86920 COMPATIBILITY TEST SPIN: CPT | Performed by: EMERGENCY MEDICINE

## 2017-07-14 PROCEDURE — 74011250637 HC RX REV CODE- 250/637: Performed by: SPECIALIST

## 2017-07-14 PROCEDURE — 02HV33Z INSERTION OF INFUSION DEVICE INTO SUPERIOR VENA CAVA, PERCUTANEOUS APPROACH: ICD-10-PCS | Performed by: INTERNAL MEDICINE

## 2017-07-14 PROCEDURE — 99285 EMERGENCY DEPT VISIT HI MDM: CPT

## 2017-07-14 PROCEDURE — 74011000250 HC RX REV CODE- 250: Performed by: SPECIALIST

## 2017-07-14 PROCEDURE — 3331090002 HH PPS REVENUE DEBIT

## 2017-07-14 PROCEDURE — 77030029131 HC ADMN ST IV BLD N DEHP ICUM -B

## 2017-07-14 PROCEDURE — C9113 INJ PANTOPRAZOLE SODIUM, VIA: HCPCS | Performed by: EMERGENCY MEDICINE

## 2017-07-14 PROCEDURE — 96375 TX/PRO/DX INJ NEW DRUG ADDON: CPT

## 2017-07-14 PROCEDURE — 80047 BASIC METABLC PNL IONIZED CA: CPT

## 2017-07-14 PROCEDURE — 74011000250 HC RX REV CODE- 250: Performed by: EMERGENCY MEDICINE

## 2017-07-14 PROCEDURE — 65610000006 HC RM INTENSIVE CARE

## 2017-07-14 PROCEDURE — 74011000250 HC RX REV CODE- 250: Performed by: PHYSICIAN ASSISTANT

## 2017-07-14 PROCEDURE — 3331090001 HH PPS REVENUE CREDIT

## 2017-07-14 PROCEDURE — 80048 BASIC METABOLIC PNL TOTAL CA: CPT | Performed by: SPECIALIST

## 2017-07-14 PROCEDURE — 36415 COLL VENOUS BLD VENIPUNCTURE: CPT | Performed by: SPECIALIST

## 2017-07-14 PROCEDURE — 94761 N-INVAS EAR/PLS OXIMETRY MLT: CPT

## 2017-07-14 PROCEDURE — 80053 COMPREHEN METABOLIC PANEL: CPT | Performed by: EMERGENCY MEDICINE

## 2017-07-14 PROCEDURE — 74011250636 HC RX REV CODE- 250/636: Performed by: SPECIALIST

## 2017-07-14 PROCEDURE — 36430 TRANSFUSION BLD/BLD COMPNT: CPT

## 2017-07-14 PROCEDURE — 85025 COMPLETE CBC W/AUTO DIFF WBC: CPT | Performed by: EMERGENCY MEDICINE

## 2017-07-14 PROCEDURE — 74011250636 HC RX REV CODE- 250/636: Performed by: EMERGENCY MEDICINE

## 2017-07-14 PROCEDURE — 83605 ASSAY OF LACTIC ACID: CPT | Performed by: EMERGENCY MEDICINE

## 2017-07-14 PROCEDURE — 84484 ASSAY OF TROPONIN QUANT: CPT | Performed by: FAMILY MEDICINE

## 2017-07-14 PROCEDURE — 74011250636 HC RX REV CODE- 250/636

## 2017-07-14 PROCEDURE — C1751 CATH, INF, PER/CENT/MIDLINE: HCPCS

## 2017-07-14 PROCEDURE — 84132 ASSAY OF SERUM POTASSIUM: CPT | Performed by: SPECIALIST

## 2017-07-14 PROCEDURE — 93971 EXTREMITY STUDY: CPT

## 2017-07-14 PROCEDURE — 74011250637 HC RX REV CODE- 250/637: Performed by: INTERNAL MEDICINE

## 2017-07-14 PROCEDURE — 30233N1 TRANSFUSION OF NONAUTOLOGOUS RED BLOOD CELLS INTO PERIPHERAL VEIN, PERCUTANEOUS APPROACH: ICD-10-PCS | Performed by: ANESTHESIOLOGY

## 2017-07-14 PROCEDURE — 77010033678 HC OXYGEN DAILY

## 2017-07-14 PROCEDURE — 94640 AIRWAY INHALATION TREATMENT: CPT

## 2017-07-14 PROCEDURE — 82330 ASSAY OF CALCIUM: CPT | Performed by: FAMILY MEDICINE

## 2017-07-14 PROCEDURE — 76937 US GUIDE VASCULAR ACCESS: CPT

## 2017-07-14 PROCEDURE — 74011250636 HC RX REV CODE- 250/636: Performed by: FAMILY MEDICINE

## 2017-07-14 PROCEDURE — P9016 RBC LEUKOCYTES REDUCED: HCPCS | Performed by: EMERGENCY MEDICINE

## 2017-07-14 PROCEDURE — 74011250636 HC RX REV CODE- 250/636: Performed by: ANESTHESIOLOGY

## 2017-07-14 PROCEDURE — 85018 HEMOGLOBIN: CPT | Performed by: FAMILY MEDICINE

## 2017-07-14 PROCEDURE — 74178 CT ABD&PLV WO CNTR FLWD CNTR: CPT

## 2017-07-14 PROCEDURE — 96365 THER/PROPH/DIAG IV INF INIT: CPT

## 2017-07-14 PROCEDURE — 71010 XR CHEST PORT: CPT

## 2017-07-14 PROCEDURE — 74011636320 HC RX REV CODE- 636/320: Performed by: EMERGENCY MEDICINE

## 2017-07-14 RX ORDER — SODIUM CHLORIDE 9 MG/ML
50 INJECTION, SOLUTION INTRAVENOUS
Status: DISPENSED | OUTPATIENT
Start: 2017-07-14 | End: 2017-07-14

## 2017-07-14 RX ORDER — POTASSIUM CHLORIDE 14.9 MG/ML
10 INJECTION INTRAVENOUS
Status: DISCONTINUED | OUTPATIENT
Start: 2017-07-14 | End: 2017-07-14

## 2017-07-14 RX ORDER — LORAZEPAM 2 MG/ML
INJECTION INTRAMUSCULAR
Status: DISPENSED
Start: 2017-07-14 | End: 2017-07-15

## 2017-07-14 RX ORDER — LORAZEPAM 2 MG/ML
1 INJECTION INTRAMUSCULAR ONCE
Status: COMPLETED | OUTPATIENT
Start: 2017-07-14 | End: 2017-07-14

## 2017-07-14 RX ORDER — POTASSIUM CHLORIDE 7.45 MG/ML
10 INJECTION INTRAVENOUS
Status: COMPLETED | OUTPATIENT
Start: 2017-07-14 | End: 2017-07-17

## 2017-07-14 RX ORDER — SODIUM CHLORIDE 0.9 % (FLUSH) 0.9 %
5-10 SYRINGE (ML) INJECTION EVERY 8 HOURS
Status: ACTIVE | OUTPATIENT
Start: 2017-07-14 | End: 2017-07-14

## 2017-07-14 RX ORDER — PANTOPRAZOLE SODIUM 40 MG/10ML
80 INJECTION, POWDER, LYOPHILIZED, FOR SOLUTION INTRAVENOUS
Status: COMPLETED | OUTPATIENT
Start: 2017-07-14 | End: 2017-07-14

## 2017-07-14 RX ORDER — SODIUM CHLORIDE 0.9 % (FLUSH) 0.9 %
5-10 SYRINGE (ML) INJECTION AS NEEDED
Status: DISCONTINUED | OUTPATIENT
Start: 2017-07-14 | End: 2017-07-19 | Stop reason: HOSPADM

## 2017-07-14 RX ORDER — SODIUM CHLORIDE 9 MG/ML
50 INJECTION, SOLUTION INTRAVENOUS CONTINUOUS
Status: DISPENSED | OUTPATIENT
Start: 2017-07-14 | End: 2017-07-14

## 2017-07-14 RX ORDER — SODIUM CHLORIDE 9 MG/ML
75 INJECTION, SOLUTION INTRAVENOUS CONTINUOUS
Status: DISCONTINUED | OUTPATIENT
Start: 2017-07-14 | End: 2017-07-14

## 2017-07-14 RX ORDER — SODIUM CHLORIDE 9 MG/ML
250 INJECTION, SOLUTION INTRAVENOUS AS NEEDED
Status: DISCONTINUED | OUTPATIENT
Start: 2017-07-14 | End: 2017-07-14

## 2017-07-14 RX ORDER — MIDAZOLAM HYDROCHLORIDE 1 MG/ML
1-2 INJECTION, SOLUTION INTRAMUSCULAR; INTRAVENOUS
Status: ACTIVE | OUTPATIENT
Start: 2017-07-14 | End: 2017-07-14

## 2017-07-14 RX ORDER — SODIUM CHLORIDE AND POTASSIUM CHLORIDE .9; .15 G/100ML; G/100ML
SOLUTION INTRAVENOUS CONTINUOUS
Status: DISCONTINUED | OUTPATIENT
Start: 2017-07-14 | End: 2017-07-16

## 2017-07-14 RX ORDER — SODIUM CHLORIDE 0.9 % (FLUSH) 0.9 %
10 SYRINGE (ML) INJECTION
Status: COMPLETED | OUTPATIENT
Start: 2017-07-14 | End: 2017-07-14

## 2017-07-14 RX ORDER — FENTANYL CITRATE 50 UG/ML
INJECTION, SOLUTION INTRAMUSCULAR; INTRAVENOUS
Status: DISPENSED
Start: 2017-07-14 | End: 2017-07-15

## 2017-07-14 RX ORDER — PANTOPRAZOLE SODIUM 40 MG/10ML
40 INJECTION, POWDER, LYOPHILIZED, FOR SOLUTION INTRAVENOUS EVERY 12 HOURS
Status: DISCONTINUED | OUTPATIENT
Start: 2017-07-14 | End: 2017-07-14 | Stop reason: SDUPTHER

## 2017-07-14 RX ORDER — DEXTROMETHORPHAN/PSEUDOEPHED 2.5-7.5/.8
1.2 DROPS ORAL
Status: DISCONTINUED | OUTPATIENT
Start: 2017-07-14 | End: 2017-07-19 | Stop reason: HOSPADM

## 2017-07-14 RX ORDER — IPRATROPIUM BROMIDE AND ALBUTEROL SULFATE 2.5; .5 MG/3ML; MG/3ML
3 SOLUTION RESPIRATORY (INHALATION)
Status: DISCONTINUED | OUTPATIENT
Start: 2017-07-14 | End: 2017-07-19

## 2017-07-14 RX ORDER — POTASSIUM CHLORIDE 750 MG/1
20 TABLET, FILM COATED, EXTENDED RELEASE ORAL
Status: COMPLETED | OUTPATIENT
Start: 2017-07-14 | End: 2017-07-14

## 2017-07-14 RX ORDER — IPRATROPIUM BROMIDE AND ALBUTEROL SULFATE 2.5; .5 MG/3ML; MG/3ML
3 SOLUTION RESPIRATORY (INHALATION)
Status: DISCONTINUED | OUTPATIENT
Start: 2017-07-14 | End: 2017-07-14

## 2017-07-14 RX ORDER — MIDAZOLAM HYDROCHLORIDE 1 MG/ML
INJECTION, SOLUTION INTRAMUSCULAR; INTRAVENOUS
Status: DISPENSED
Start: 2017-07-14 | End: 2017-07-15

## 2017-07-14 RX ORDER — SODIUM CHLORIDE 0.9 % (FLUSH) 0.9 %
5-10 SYRINGE (ML) INJECTION EVERY 8 HOURS
Status: DISCONTINUED | OUTPATIENT
Start: 2017-07-14 | End: 2017-07-19 | Stop reason: HOSPADM

## 2017-07-14 RX ORDER — ONDANSETRON 2 MG/ML
4 INJECTION INTRAMUSCULAR; INTRAVENOUS
Status: DISCONTINUED | OUTPATIENT
Start: 2017-07-14 | End: 2017-07-19 | Stop reason: HOSPADM

## 2017-07-14 RX ORDER — LORAZEPAM 1 MG/1
1 TABLET ORAL
Status: DISCONTINUED | OUTPATIENT
Start: 2017-07-14 | End: 2017-07-19 | Stop reason: HOSPADM

## 2017-07-14 RX ORDER — SODIUM CHLORIDE 0.9 % (FLUSH) 0.9 %
5-10 SYRINGE (ML) INJECTION AS NEEDED
Status: ACTIVE | OUTPATIENT
Start: 2017-07-14 | End: 2017-07-14

## 2017-07-14 RX ORDER — PROPOFOL 10 MG/ML
10-1000 INJECTION, EMULSION INTRAVENOUS
Status: DISCONTINUED | OUTPATIENT
Start: 2017-07-14 | End: 2017-07-16

## 2017-07-14 RX ORDER — POTASSIUM CHLORIDE 7.45 MG/ML
10 INJECTION INTRAVENOUS
Status: DISPENSED | OUTPATIENT
Start: 2017-07-14 | End: 2017-07-14

## 2017-07-14 RX ORDER — MUPIROCIN 20 MG/G
OINTMENT TOPICAL 2 TIMES DAILY
Status: DISPENSED | OUTPATIENT
Start: 2017-07-14 | End: 2017-07-19

## 2017-07-14 RX ORDER — FENTANYL CITRATE 50 UG/ML
25 INJECTION, SOLUTION INTRAMUSCULAR; INTRAVENOUS
Status: ACTIVE | OUTPATIENT
Start: 2017-07-14 | End: 2017-07-14

## 2017-07-14 RX ADMIN — POTASSIUM CHLORIDE 10 MEQ: 10 INJECTION, SOLUTION INTRAVENOUS at 11:18

## 2017-07-14 RX ADMIN — POTASSIUM CHLORIDE 10 MEQ: 10 INJECTION, SOLUTION INTRAVENOUS at 15:02

## 2017-07-14 RX ADMIN — POTASSIUM CHLORIDE 20 MEQ: 750 TABLET, FILM COATED, EXTENDED RELEASE ORAL at 18:15

## 2017-07-14 RX ADMIN — POTASSIUM CHLORIDE 10 MEQ: 10 INJECTION, SOLUTION INTRAVENOUS at 17:10

## 2017-07-14 RX ADMIN — LORAZEPAM 1 MG: 2 INJECTION INTRAMUSCULAR; INTRAVENOUS at 18:57

## 2017-07-14 RX ADMIN — PANTOPRAZOLE SODIUM 80 MG: 40 INJECTION, POWDER, FOR SOLUTION INTRAVENOUS at 06:50

## 2017-07-14 RX ADMIN — MUPIROCIN: 20 OINTMENT TOPICAL at 18:15

## 2017-07-14 RX ADMIN — POLYETHYLENE GLYCOL 3350, SODIUM SULFATE ANHYDROUS, SODIUM BICARBONATE, SODIUM CHLORIDE, POTASSIUM CHLORIDE 4000 ML: 236; 22.74; 6.74; 5.86; 2.97 POWDER, FOR SOLUTION ORAL at 10:14

## 2017-07-14 RX ADMIN — POTASSIUM CHLORIDE 20 MEQ: 750 TABLET, FILM COATED, EXTENDED RELEASE ORAL at 23:10

## 2017-07-14 RX ADMIN — SODIUM CHLORIDE AND POTASSIUM CHLORIDE: 9; 1.49 INJECTION, SOLUTION INTRAVENOUS at 10:19

## 2017-07-14 RX ADMIN — IPRATROPIUM BROMIDE AND ALBUTEROL SULFATE 3 ML: .5; 3 SOLUTION RESPIRATORY (INHALATION) at 20:13

## 2017-07-14 RX ADMIN — Medication 10 ML: at 22:06

## 2017-07-14 RX ADMIN — POTASSIUM CHLORIDE 10 MEQ: 10 INJECTION, SOLUTION INTRAVENOUS at 06:50

## 2017-07-14 RX ADMIN — MUPIROCIN: 20 OINTMENT TOPICAL at 12:40

## 2017-07-14 RX ADMIN — SODIUM CHLORIDE 75 ML/HR: 900 INJECTION, SOLUTION INTRAVENOUS at 10:14

## 2017-07-14 RX ADMIN — POTASSIUM CHLORIDE 10 MEQ: 10 INJECTION, SOLUTION INTRAVENOUS at 16:01

## 2017-07-14 RX ADMIN — POTASSIUM CHLORIDE 10 MEQ: 10 INJECTION, SOLUTION INTRAVENOUS at 10:15

## 2017-07-14 RX ADMIN — IPRATROPIUM BROMIDE AND ALBUTEROL SULFATE 3 ML: .5; 3 SOLUTION RESPIRATORY (INHALATION) at 13:38

## 2017-07-14 RX ADMIN — POTASSIUM CHLORIDE 10 MEQ: 10 INJECTION, SOLUTION INTRAVENOUS at 18:15

## 2017-07-14 RX ADMIN — POTASSIUM CHLORIDE 10 MEQ: 10 INJECTION, SOLUTION INTRAVENOUS at 12:40

## 2017-07-14 RX ADMIN — POTASSIUM CHLORIDE 20 MEQ: 750 TABLET, FILM COATED, EXTENDED RELEASE ORAL at 22:04

## 2017-07-14 RX ADMIN — IOPAMIDOL 100 ML: 755 INJECTION, SOLUTION INTRAVENOUS at 11:00

## 2017-07-14 RX ADMIN — Medication 10 ML: at 14:03

## 2017-07-14 RX ADMIN — POTASSIUM CHLORIDE 10 MEQ: 10 INJECTION, SOLUTION INTRAVENOUS at 18:14

## 2017-07-14 RX ADMIN — SODIUM CHLORIDE 40 MG: 9 INJECTION INTRAMUSCULAR; INTRAVENOUS; SUBCUTANEOUS at 10:16

## 2017-07-14 RX ADMIN — SODIUM CHLORIDE 40 MG: 9 INJECTION INTRAMUSCULAR; INTRAVENOUS; SUBCUTANEOUS at 22:05

## 2017-07-14 RX ADMIN — POTASSIUM CHLORIDE 10 MEQ: 10 INJECTION, SOLUTION INTRAVENOUS at 14:02

## 2017-07-14 RX ADMIN — Medication 10 ML: at 09:00

## 2017-07-14 RX ADMIN — POTASSIUM CHLORIDE 10 MEQ: 10 INJECTION, SOLUTION INTRAVENOUS at 16:46

## 2017-07-14 NOTE — PROGRESS NOTES
Patient examined, consult dictated. He presents with LGI bleed, acute blood loss anemia and hypokalemia. CTA is negative for acute bleeding. Recommend IV fluids, blood transfusion, IV PPI. Keep him NPO.  Dr. Mabel Peck will see follow him in the hospital.

## 2017-07-14 NOTE — ED NOTES
Patients color has improved, still remains pale however no longer grey. The patient reports he feels less anxious at this time. Second unit of blood infusing without issue. Wife remains at bedside. No additional bleeding episodes at this time. The patient is connected to the monitor, blood pressure and continuous pulse oximetry and the cardiac monitor.

## 2017-07-14 NOTE — ED NOTES
Bedside and verbal shift reports given to [employer]. Wife at the bedside. Patient has no complaints at this time. The patient is connected to the monitor, blood pressure and continuous pulse oximetry and the cardiac monitor. Patient is resting comfortably, bed in the lowest position, side rails raised, call bell in hand, lights dim. Instructed patient to not get up without assistance, and to ring the call bell for any questions or concerns. Updated patient on the plan of care.

## 2017-07-14 NOTE — ED TRIAGE NOTES
Assumed care of patient in the Emergency Department, addressed patient and family with AIDET process. The patient reports to the ED with complaints of rectal bleeding. Reports slight bleeding two days ago into yesterday. Reports seeing GI MD at [facility] and his hemoglobin was \"okay\" per patient and wife. The wife reports that this morning he was \"not feeling right\". Reports that they were going to come to the ED anyway, and then he needed to use the restroom. The wife ambulated the patient to the restroom, with an unsteady gait experiencing dizziness and weakness. The patient lowered himself to the ground, did not fall, and had a bloody bowel movement. EMS and wife report minimal stool, just all blood. Reports approx 1000cc of bright red blood loss PTA. The patient arrives to us covered in blood, with a large congealed clots of blood in the underwear. Patient is very pale, grey, and clammy. Per EMS the patient was tachycardic, currently the patient is in an irregular bradycardic rhythm. Will do EKG to confirm whether bradycardia or A fib. Patient has a history of A fib, currently not on any beta blockers or blood thinners. Patient has an 18g LAC via EMS, 16G RAC started in the ED. Patient is hypotensive, MAP of 65. Patient placed on 10L NRB for comfort due to blood loss. Patient was not hypoxic on room air, solely due to circumstance. Patient changed into clean gown, CHG wipes used to clean patients arms and legs of blood. Patient received 2L 0.9%NS pressure bag. MD at the bedside to evaluate. The patient is connected to the monitor, blood pressure and continuous pulse oximetry and the cardiac monitor. Wife at bedside, patient and wife agree to uncrossed matched PRBC for the first unit at this time.      Will continue to monitor 1:1

## 2017-07-14 NOTE — ROUTINE PROCESS
TRANSFER - IN REPORT:    Verbal report received from Samantha Murphy RN (name) on Leland Cost  being received from CCU room  (unit) for ordered procedure      Report consisted of patients Situation, Background, Assessment and   Recommendations(SBAR). Information from the following report(s) SBAR, Procedure Summary, Intake/Output, MAR, Recent Results and Cardiac Rhythm NSR was reviewed with the receiving nurse. Opportunity for questions and clarification was provided. Assessment completed upon patients arrival to unit and care assumed.

## 2017-07-14 NOTE — ED NOTES
Patients axillary temperature is 96.0 - MD Seamus Montgomery and MD Nicci Wing notified. The patient denies chest pain or shortness of breath, denies dizziness or weakness. The patient denies nausea, vomiting. Reports to continue the blood at this time. Will continue to monitor closely.

## 2017-07-14 NOTE — CONSULTS
[address]   [address] [facility]       Name:  Adrián Lopez   MR#:  [id number]   :  1940   Account #:  [de-identified]    Date of Consultation:  2017   Date of Adm:  2017       REASON FOR CONSULTATION: GI bleed. CHIEF COMPLAINT: Bleeding per rectum for 2 days. HISTORY OF PRESENT ILLNESS: Patient is a 62-year-old white male. He was recently discharged from [facility] after an   episode of sigmoid volvulus, which was decompressed by Dr. Josseline Bledsoe on . Patient was doing well until 2 days ago   when he first noticed some blood in his stools. He went to see his prior   gastroenterologist, Dr. Ethan Mora who performed a CBC and the   patient was told that he had mild anemia. Patient was at home around   3:30 this morning when he passed a large amount of fresh blood per   rectum. He was rushed to the hospital by ambulance and in the ER, he   was found to be covered in blood. Patient denied any abdominal pain   but he has been feeling dizzy since the last episode of bleeding. He   denies any syncope or chest pain. He denies any vomiting, diarrhea, or   hematemesis. He denies any melena. He was recently diagnosed with   atrial fibrillation but was not started on any anticoagulants. PAST MEDICAL HISTORY: Significant for chronic kidney disease,   pacemaker placement, hip replacement, knee surgery and back   surgery. SOCIAL HISTORY: He is a nonsmoker and social drinker. ALLERGIES: HE IS ALLERGIC TO:    1. AUGMENTIN. 2. CIPRO. MEDICATIONS AT HOME: Include:    1. Norco.   2. Ativan. 3. Norvasc. 4. Delacort. 5. Proscar. 6. MiraLax. 7. Toprol XL. 8. Prilosec. 9. Lyrica. 10. Flomax. 11. Deltasone. REVIEW OF SYSTEMS   CONSTITUTIONAL: Positive for weakness and dizziness. Negative for   weight loss and fever.    GASTROINTESTINAL: Positive for bright red bleeding per rectum. RESPIRATORY: No cough or shortness of breath. CARDIOVASCULAR: No chest pain, syncope, palpitations. CENTRAL NERVOUS SYSTEM: No seizures or loss of   consciousness. No lymphatic, hematologic, musculoskeletal, genitourinary, endocrine,   metabolic. All other systems are negative. PHYSICAL EXAMINATION   GENERAL: He is alert. VITAL SIGNS: Blood pressure 119/55, pulse of 67, temperature 96. HEENT: Mucous membranes are pale. Pupils are equal, reactive to   light and accommodation. Eye movements are normal.   NECK: Supple. There is no carotid bruit, jugular venous distention. CHEST: Clear to auscultation. No crackle or wheeze. CARDIOVASCULAR: Regular rate and rhythm. First and second   sounds are normal. No murmurs. ABDOMEN: Soft, nontender, normoactive bowel sounds. There is no   rigidity, no rebound, no palpable masses. CENTRAL NERVOUS SYSTEM: He is alert and oriented x3. There is   no gross focal neurological deficits. DIGITAL RECTAL EXAM: Performed by the ER physician. LABORATORY DATA: White count is 9.7, hemoglobin of 6, platelets   are 213. Potassium is 2.5, BUN is 13, calcium is 6.7, albumin is 1.9,   alkaline phosphatase is 30, lactic acid is 2.9. ASSESSMENT: A 82-year-old white male who presented with a history   of lower gastrointestinal bleed. CT angiogram performed in the   emergency room is negative for active bleeding. Most likely source of   bleeding is diverticulosis, although the causes such as stercoral ulcers,   arteriovenous malformations, peptic ulcer disease, and GI tract   neoplasms cannot be excluded. We will transfuse him with 2 units of   blood as soon as possible and repeat his hemoglobin. He will be   admitted to ICU. Dr. Jose Gloria will follow him in the hospital while he is   here. If he develops significant gastrointestinal bleeding, he may need   mesenteric angiogram and embolization. His medical management is   of high complexity. RECOMMENDATIONS    1. IV fluid bolus. 2. Protonix 80 mg IV stat followed by 40 mg IV q.12h.   3. Keep patient n.p.o.    4. Hemoglobin and hematocrit q.6h.    5. Transfuse 2 units of blood. 6. Dr. Shagufta Chrsitopher will follow patient in the hospital.    7. Consider mesenteric angiogram and embolization if significant   bleeding recurs. Thank you for the consultation.          Salty Langford MD      PK / [address]o Dinero   D:  07/14/2017   07:07   T:  07/14/2017   09:27   Job #:  [id number]

## 2017-07-14 NOTE — PROGRESS NOTES
7/14/2017  10:16 AM    INTENSIVIST PROGRESS NOTE:     Patient seen and evaluated   77 yo male presented with 2 days hx of rectal bleeding, BRBPR  Received 2u of blood this am  Resting comfortable now in ccu  No acute complaints   No acute distress    Visit Vitals    /54    Pulse 65    Temp 97.5 °F (36.4 °C)    Resp 21    Wt 89.4 kg (197 lb)    SpO2 99%    BMI 26.72 kg/m2       General: no distress  CV: RRR  Lungs: clear    CXR: bibasilar atx    Labs reviewed    A/P:  NPO  IV protonix  Transfuse as needed  Colonoscopy today  Will assist on disposition planning when stable for transfer  Isauro Hinton MD

## 2017-07-14 NOTE — PROGRESS NOTES
He is resting comfortably in ER. His 2nd unit of PRBC's is infusing. Repeat H/H is pending. HR and BP normal. Last episode of hematochezia was at 0500. No abd pain/tenderness or N/V. He ate very little yesterday. He's had very little stool output since his recent hospital discharge. He's had 8-9 episodes of large amount of BRBPR in the past 48hrs. Not on anticoagulation. Had presyncopal episode at home PTA. CTA is negative for active bleed. Large amount of stool in rectum. Abd is slightly distended with hyperactive, high pitched bowel sounds. Non tender. Soft. Heart RRR. Lungs CTA bilaterally. I feel he likely has LGIB, though brisk UGIB is a possibility. I recommend EGD/Colon later today to further evaluate and will start prepping him now. Nursing [facility]) notified. Plan formulated in conjunction with Dr. Ronald Mendiola.       THIEN Sierra  07/14/17  8:42 AM

## 2017-07-14 NOTE — PROCEDURES
Central Line Procedure Note    Indication: Inadequate venous access    Risks, benefits, alternatives explained and patient agrees to proceed. Patient positioned in Trendelenburg. 7-Step Sterility Protocol followed. (cap, mask sterile gown, sterile gloves, large sterile sheet, hand hygiene, 2% chlorhexidine for cutaneous antisepsis)  5 mL 1% Lidocaine placed at insertion site. Right femoral cannulated x 1 attempt(s) utilizing the Seldinger technique. Catheter secured & Biopatch applied. Sterile Tegaderm placed. CXR pending.     Care turned over to covering Attending MD.

## 2017-07-14 NOTE — ED NOTES
Patient is back from CT at this time. Patient is tachypneic, reports he is very anxious. Coached patient to slow his breathing.  Wife at bedside

## 2017-07-14 NOTE — ED NOTES
Patient continues to have a dry, hacking cough. Reports last admission he had PNA. Unsure if treated after discharge.  Portable chest xray ordered per MD

## 2017-07-14 NOTE — PROGRESS NOTES
Patient's iSTAT potassium was 2.8, despite x7 runs of Potassium today. Per Dr. Gio Ramsey patient will need K to be increased prior to sedation. Patient and family are aware. Procedure postponed for now.

## 2017-07-14 NOTE — PROGRESS NOTES
The risks and benefits of the bite block have been explained to patient. Patient verbalizes understanding. Patient with Mena Cons few chipped teeth\" but denies any loose.

## 2017-07-14 NOTE — ED NOTES
TRANSFER - OUT REPORT:    Verbal report given to Elisa Fowler RN (name) on Anna Marie Turner  being transferred to CCU (unit) for routine progression of care       Report consisted of patients Situation, Background, Assessment and   Recommendations(SBAR). Information from the following report(s) SBAR, Kardex, ED Summary, Intake/Output, MAR, Recent Results and Med Rec Status was reviewed with the receiving nurse. Lines:   Peripheral IV 07/14/17 Right Antecubital (Active)   Site Assessment Clean, dry, & intact 7/14/2017  5:41 AM   Phlebitis Assessment 0 7/14/2017  5:41 AM   Infiltration Assessment 0 7/14/2017  5:41 AM   Dressing Status Clean, dry, & intact 7/14/2017  5:41 AM   Hub Color/Line Status Michell Barrio 7/14/2017  5:41 AM       Peripheral IV 07/14/17 Left Antecubital (Active)   Site Assessment Clean, dry, & intact 7/14/2017  5:42 AM   Phlebitis Assessment 0 7/14/2017  5:42 AM   Infiltration Assessment 0 7/14/2017  5:42 AM   Dressing Status Clean, dry, & intact 7/14/2017  5:42 AM   Hub Color/Line Status Green 7/14/2017  5:42 AM        Opportunity for questions and clarification was provided.       Patient transported with:   Registered Nurse

## 2017-07-14 NOTE — IP AVS SNAPSHOT
[address] [facility] 
[phone] Patient: Stefan Gonzalez MRN: [id number] DGI:[id number]/[id number] Current Discharge Medication List  
  
START taking these medications Dose & Instructions Dispensing Information Comments Morning Noon Evening Bedtime * OTHER Your last dose was: Your next dose is: CBC BMP fax results to PCP Quantity:  1 Act Refills:  0  
     
   
   
   
  
 * OTHER Your last dose was: Your next dose is: CXR in 3 weeks Follow-up [place] Send results to PCP Quantity:  1 Act Refills:  0  
     
   
   
   
  
 pantoprazole 40 mg tablet Commonly known as:  PROTONIX Your last dose was: Your next dose is:    
   
   
 Dose:  40 mg Take 1 Tab by mouth Daily (before breakfast). Quantity:  30 Tab Refills:  1  
     
   
   
   
  
 * Notice: This list has 2 medication(s) that are the same as other medications prescribed for you. Read the directions carefully, and ask your doctor or other care provider to review them with you. CONTINUE these medications which have NOT CHANGED Dose & Instructions Dispensing Information Comments Morning Noon Evening Bedtime  
 acetaminophen 500 mg tablet Commonly known as:  TYLENOL Your last dose was: Your next dose is:    
   
   
 Dose:  500 mg Take 500 mg by mouth every six (6) hours as needed. Refills:  0  
     
   
   
   
  
 amiodarone 200 mg tablet Commonly known as:  CORDARONE Your last dose was: Your next dose is:    
   
   
 Dose:  200 mg Take 1 Tab by mouth two (2) times a day for 14 days. Then, starting on 7/23, decrease dose to 200 mg by mouth daily in the morning for 14 more days. (28 days total therapy) Quantity:  42 Tab Refills:  0  
     
   
   
   
  
 benzonatate 100 mg capsule Commonly known as:  TESSALON  
   
 Your last dose was: Your next dose is:    
   
   
 Dose:  100 mg Take 1 Cap by mouth every eight (8) hours as needed for Cough for up to 7 days. Quantity:  15 Cap Refills:  0  
     
   
   
   
  
 FLOMAX 0.4 mg capsule Generic drug:  tamsulosin Your last dose was: Your next dose is:    
   
   
 Dose:  0.4 mg Take 0.4 mg by mouth daily. Refills:  0  
     
   
   
   
  
 guaiFENesin-dextromethorphan 100-10 mg/5 mL syrup Commonly known as:  ROBITUSSIN DM Your last dose was: Your next dose is:    
   
   
 Dose:  10 mL Take 10 mL by mouth every six (6) hours as needed for up to 10 days. Quantity:  1 Bottle Refills:  0 HYDROcodone-acetaminophen 5-325 mg per tablet Commonly known as:  Vidya Plum Your last dose was: Your next dose is:    
   
   
 Dose:  1 Tab Take 1 Tab by mouth every six (6) hours as needed for Pain. Max Daily Amount: 4 Tabs. Quantity:  10 Tab Refills:  0  
     
   
   
   
  
 linaclotide 145 mcg Cap capsule Commonly known as:  Mariusz Brito Your last dose was: Your next dose is:    
   
   
 Dose:  145 mcg Take 1 Cap by mouth Daily (before breakfast). Quantity:  30 Cap Refills:  0 LORazepam 1 mg tablet Commonly known as:  ATIVAN Your last dose was: Your next dose is: Take  by mouth every four (4) hours as needed. Refills:  0 OXYGEN-AIR DELIVERY SYSTEMS Your last dose was: Your next dose is:    
   
   
 Dose:  2 L  
2 L by Nasal route as needed (sob). concentrator and portable tanks in the home Refills:  0  
     
   
   
   
  
 polyethylene glycol 17 gram packet Commonly known as:  Janice Perez Your last dose was: Your next dose is:    
   
   
 Dose:  17 g Take 1 Packet by mouth daily. Quantity:  30 Packet Refills:  0 potassium chloride SR 20 mEq tablet Commonly known as:  K-TAB Your last dose was: Your next dose is:    
   
   
 Dose:  20 mEq Take 20 mEq by mouth as needed (with bumex use daily). Refills:  0 STOP taking these medications   
 aspirin 81 mg chewable tablet  
   
  
 bumetanide 1 mg tablet Commonly known as:  BUMEX  
   
  
 cephALEXin 500 mg capsule Commonly known as:  KEFLEX  
   
  
 doxycycline 100 mg tablet Commonly known as:  VIBRA-TABS  
   
  
 finasteride 5 mg tablet Commonly known as:  PROSCAR  
   
  
 l.acidoph-B.lactis-B.longum 460 mg (7.5-6- 1.5 bill. cell) Cap cap Commonly known as:  FLORAJEN3  
   
  
 LYRICA 100 mg capsule Generic drug:  pregabalin  
   
  
 metoprolol succinate 50 mg XL tablet Commonly known as:  TOPROL-XL  
   
  
 naproxen 250 mg tablet Commonly known as:  NAPROSYN PriLOSEC 20 mg capsule Generic drug:  omeprazole Where to Get Your Medications These medications were sent to [address], [address] [place] [address] Phone:  [phone]  
  benzonatate 100 mg capsule  
 guaiFENesin-dextromethorphan 100-10 mg/5 mL syrup  
 pantoprazole 40 mg tablet Information on where to get these meds will be given to you by the nurse or doctor. ! Ask your nurse or doctor about these medications   OTHER  
 OTHER

## 2017-07-14 NOTE — PROGRESS NOTES
0911-.TRANSFER - IN REPORT:    Verbal report received from [facility], RN (name) on Jonh Serve  being received from ED (unit) for urgent transfer      Report consisted of patients Situation, Background, Assessment and   Recommendations(SBAR). Information from the following report(s) SBAR, Kardex, ED Summary, Procedure Summary, Intake/Output, MAR, Recent Results and Cardiac Rhythm NSR was reviewed with the receiving nurse. Opportunity for questions and clarification was provided. Assessment completed upon patients arrival to unit and care assumed. Primary Nurse Renate Dave RN and Pilo Marquez RN performed a dual skin assessment on this patient No impairment noted  Alban score is 15    1000-2nd unit of blood complete. Now replacing K+ that was 2.5. Aware of ST elevation in lead V.  Q6h troponins already ordered. No c/o CP or SOB. 1014-Patient started drinking bowel prep (which was just received from Rx). EGD/colonoscopy scheduled for 1600 today. Consents signed. 1600-Endo at bedside and need a K+ istat reading to determine if patient is stable enough to have endoscopy. Myself and one other nurse attempting to get blood sample and were unable. PICC team on their way to try. 1630-PICC team at bedside. After multiple attempts, 1 20g IV place and enough blood drawn to determine that K+ is still 2.8 after 70mEq K+.       1700-Dr. Damon at bedside. Verbalized to patient that he is going to wait until tomorrow to do the EGD and colonoscopy and try to suppliment his K+ tonight. Verbalized that he want's Anesthesia to place a CL, finish the bowel prep later on tonight, and that the patient can have sips of clear liquids tonight. 1900-Dr. Smith at bedside to place CL.   Patient started to have a panic attack, difficulty breathing, and was unable to turn his head enough while he was attempting to place is in his neck so it had to be placed in his right fem instead with 1mg of IV Ativan to help keep him still and calm.

## 2017-07-14 NOTE — ED NOTES
Bedside and Verbal shift change report given to Tarun Wall RN  (oncoming nurse) by [facility], RN  (offgoing nurse). Report included the following information SBAR, Kardex, ED Summary, Intake/Output, MAR, Recent Results and Med Rec Status.

## 2017-07-14 NOTE — ED PROVIDER NOTES
HPI Comments: Luis Ogden is a 78 y.o. male with history significant for CKD presenting via EMS to [address] [facility] ED with c/o rectal bleeding. Reports slight bleeding two days ago into yesterday. Reports seeing GI MD at [facility] and his hemoglobin was \"okay\" per patient and wife. The wife reports that this morning he was \"not feeling right\". Reports that they were going to come to the ED anyway, and then he needed to use the restroom. The wife ambulated the patient to the restroom, with an unsteady gait experiencing dizziness and weakness. The patient lowered himself to the ground, did not fall, and had a bloody bowel movement. EMS and wife report minimal stool, just all blood. Reports approx 1000cc of bright red blood loss PTA. Per EMS the patient was tachycardic, currently the patient is in an irregular bradycardic rhythm. Patient reports a history of A fib, currently not on any beta blockers or blood thinners. Pt specifically denies any nausea, vomiting, fevers, chills, abdominal pain, chest pain, or SOB. PCP: Ellis Burns MD    PSHx: colonoscopy   Social Hx: + EtOH; - Smoker; - Illicit Drugs    There are no other changes, complaints or physical findings at this time. Written by Kimberly Hodges ED Scribjyoti, as dictated by Lynda Garcia MD.     The history is provided by the patient and the EMS personnel. Past Medical History:   Diagnosis Date    Chronic kidney disease     Pacemaker 2013     Past Surgical History:   Procedure Laterality Date    COLONOSCOPY N/A 7/2/2017    COLONOSCOPY ER 21 performed by Onel Sales MD at [facility] ENDOSCOPY    HX ORTHOPAEDIC      hip replacement, knee, back     History reviewed. No pertinent family history. Social History     Social History    Marital status:      Spouse name: N/A    Number of children: N/A    Years of education: N/A     Occupational History    Not on file.      Social History Main Topics    Smoking status: Never Smoker    Smokeless tobacco: Never Used  Alcohol use Yes      Comment: occationally    Drug use: No    Sexual activity: Not on file     Other Topics Concern    Not on file     Social History Narrative     ALLERGIES: Augmentin [amoxicillin-pot clavulanate] and Ciprofloxacin    Review of Systems   Constitutional: Positive for fatigue. Negative for chills and fever. HENT: Negative. Negative for congestion, rhinorrhea, sneezing and sore throat. Eyes: Negative. Negative for redness and visual disturbance. Respiratory: Negative. Negative for cough, shortness of breath and wheezing. Cardiovascular: Negative. Negative for chest pain and leg swelling. Gastrointestinal: Positive for anal bleeding. Negative for abdominal pain, diarrhea, nausea and vomiting. Genitourinary: Negative. Negative for difficulty urinating, discharge and frequency. Musculoskeletal: Negative. Negative for arthralgias, back pain, myalgias and neck stiffness. Skin: Negative. Negative for color change and rash. Neurological: Positive for dizziness. Negative for syncope, weakness, numbness and headaches. Hematological: Negative for adenopathy. Psychiatric/Behavioral: Negative. All other systems reviewed and are negative. Patient Vitals for the past 12 hrs:   Temp Pulse Resp BP SpO2   07/14/17 0619 (P) 97.4 °F (36.3 °C) (P) 65 (P) 23 (P) 115/55 (P) 96 %   07/14/17 0538 97.8 °F (36.6 °C) 64 18 114/40 100 %   07/14/17 0523 97.4 °F (36.3 °C) 69 26 114/44 98 %   07/14/17 0508 97.5 °F (36.4 °C) 65 30 108/49 100 %     Physical Exam   Constitutional: He is oriented to person, place, and time. He appears distressed. Blood soaked underwear with clots   HENT:   Head: Atraumatic. Pale conjunctiva    Eyes: EOM are normal.   Cardiovascular: Normal rate, regular rhythm, normal heart sounds and intact distal pulses. Exam reveals no gallop and no friction rub. No murmur heard. Pulmonary/Chest: Effort normal and breath sounds normal. No respiratory distress.  He has no wheezes. He has no rales. He exhibits no tenderness. Abdominal: Soft. Bowel sounds are normal. He exhibits no distension and no mass. There is no tenderness. There is no rebound and no guarding. Musculoskeletal: Normal range of motion. He exhibits no edema or tenderness. Significant blood over BLE   Neurological: He is alert and oriented to person, place, and time. Skin: He is diaphoretic. There is pallor. Cool skin   Psychiatric: He has a normal mood and affect. Nursing note and vitals reviewed. MDM  Number of Diagnoses or Management Options  Diagnosis management comments: DDx: acute GI bleed with acute blood loss anemia       Amount and/or Complexity of Data Reviewed  Clinical lab tests: reviewed  Tests in the radiology section of CPT®: reviewed and ordered  Obtain history from someone other than the patient: yes (EMS)  Review and summarize past medical records: yes  Discuss the patient with other providers: yes (GI, Dr. Ni Sylvester, Dr. Francisco Javier Covington)  Independent visualization of images, tracings, or specimens: yes    Patient Progress  Patient progress: stable    ED Course     Procedures    CONSULT NOTE:   5:09 AM  Carla Nelson MD spoke with Dr. Anabelle Pierson,  Specialty: GI  Discussed pt's hx, disposition, and available diagnostic and imaging results. Reviewed care plans. Consultant agrees with plans as outlined. Advises to administer a CTA abdomen pelvis, if positive call IR. Advises to start a Protonix drip alongside a unit of blood, Plavix, and fluids. States to admit the pt to the ICU. Written by JIM Senae, as dictated by Carla Nelson MD.    CONSULT NOTE:   5:25 AM  Carla Nelson MD spoke with Dr. Francisco Javier Covington,  Specialty: Carla Nelson MD  Discussed pt's hx, disposition, and available diagnostic and imaging results. Reviewed care plans. Consultant agrees with plans as outlined. States to call back and update with imaging results.    Written by JIM Sena, as dictated by Eveline Sandoval MD.    CRITICAL CARE NOTE :    5:30 AM    IMPENDING DETERIORATION -Cardiovascular    ASSOCIATED RISK FACTORS - Hypotension and Bleeding    MANAGEMENT- Bedside Assessment and Supervision of Care    INTERPRETATION -  Xrays, CT Scan, ECG and Blood Pressure    INTERVENTIONS - hemodynamic mngmt    CASE REVIEW - Hospitalist, Medical Sub-Specialist, Nursing and Family    TREATMENT RESPONSE -Stable    PERFORMED BY - Self    NOTES   :    I have spent >100 minutes of critical care time involved in lab review, consultations with specialist, family decision- making, bedside attention and documentation. During this entire length of time I was immediately available to the patient . Eveline Sandoval MD    Progress Note:   6:25 AM  Called Dr. Telma Gonsalez back to inform him no bleed was visualized. States he will come evaluate the pt. Hospitalist given an update as well. Will admit the pt. Written by JIM Velazquez, as dictated by Eveline Sandoval MD.     LABORATORY TESTS:  Recent Results (from the past 12 hour(s))   CBC WITH AUTOMATED DIFF    Collection Time: 07/14/17  4:45 AM   Result Value Ref Range    WBC 9.7 4.1 - 11.1 K/uL    RBC 1.76 (L) 4.10 - 5.70 M/uL    HGB 6.0 (L) 12.1 - 17.0 g/dL    HCT 16.9 (LL) 36.6 - 50.3 %    MCV 96.0 80.0 - 99.0 FL    MCH 34.1 (H) 26.0 - 34.0 PG    MCHC 35.5 30.0 - 36.5 g/dL    RDW 13.4 11.5 - 14.5 %    PLATELET 755 526 - 049 K/uL    NEUTROPHILS PENDING %    LYMPHOCYTES PENDING %    MONOCYTES PENDING %    EOSINOPHILS PENDING %    BASOPHILS PENDING %    ABS. NEUTROPHILS PENDING K/UL    ABS. LYMPHOCYTES PENDING K/UL    ABS. MONOCYTES PENDING K/UL    ABS. EOSINOPHILS PENDING K/UL    ABS.  BASOPHILS PENDING K/UL    DF PENDING    METABOLIC PANEL, COMPREHENSIVE    Collection Time: 07/14/17  4:45 AM   Result Value Ref Range    Sodium 139 136 - 145 mmol/L    Potassium 2.5 (LL) 3.5 - 5.1 mmol/L    Chloride 105 97 - 108 mmol/L    CO2 24 21 - 32 mmol/L    Anion gap 10 5 - 15 mmol/L Glucose 159 (H) 65 - 100 mg/dL    BUN 13 6 - 20 MG/DL    Creatinine 0.99 0.70 - 1.30 MG/DL    BUN/Creatinine ratio 13 12 - 20      GFR est AA >60 >60 ml/min/1.73m2    GFR est non-AA >60 >60 ml/min/1.73m2    Calcium 6.7 (L) 8.5 - 10.1 MG/DL    Bilirubin, total 0.4 0.2 - 1.0 MG/DL    ALT (SGPT) 10 (L) 12 - 78 U/L    AST (SGOT) 7 (L) 15 - 37 U/L    Alk. phosphatase 30 (L) 45 - 117 U/L    Protein, total 4.2 (L) 6.4 - 8.2 g/dL    Albumin 1.9 (L) 3.5 - 5.0 g/dL    Globulin 2.3 2.0 - 4.0 g/dL    A-G Ratio 0.8 (L) 1.1 - 2.2     LACTIC ACID    Collection Time: 07/14/17  4:45 AM   Result Value Ref Range    Lactic acid 2.9 (HH) 0.4 - 2.0 MMOL/L   TYPE & CROSSMATCH    Collection Time: 07/14/17  4:45 AM   Result Value Ref Range    Crossmatch Expiration 07/17/2017     ABO/Rh(D) A POSITIVE     Antibody screen NEG     Unit number [id number]     Blood component type  LR AS1     Unit division 00     Status of unit ISSUED     Crossmatch result Compatible     Unit number [id number]     Blood component type  LR AS1     Unit division 00     Status of unit ISSUED     Crossmatch result Compatible      IMAGING RESULTS:  INDICATION: Bright red blood per rectum.     COMPARISON: July 1, 2017     TECHNIQUE:   Thin axial images were obtained through the abdomen and pelvis. Then, following  the uneventful intravenous administration of 100 cc Isovue-370, thin axial  images were obtained through the abdomen and pelvis in portal venous and delayed  phases. Coronal and sagittal reconstructions were generated. Oral contrast was  not administered. CT dose reduction was achieved through use of a standardized  protocol tailored for this examination and automatic exposure control for dose  modulation.     FINDINGS:   LUNG BASES: Elevated right hemidiaphragm. Right lower lobe atelectasis. INCIDENTALLY IMAGED HEART AND MEDIASTINUM: Normal heart size. Coronary artery  calcifications present. LIVER: No mass or biliary dilatation.   GALLBLADDER: Unremarkable. SPLEEN: No mass. PANCREAS: No mass or ductal dilatation. ADRENALS: Unremarkable. KIDNEYS: No mass, calculus, or hydronephrosis. Bilateral renal cortical  scarring. STOMACH: Unremarkable. SMALL BOWEL: No dilatation or wall thickening. No evidence of active bleed. COLON: No dilatation or wall thickening. Large amount stool in the rectum. No  evidence of active bleed. APPENDIX: Unremarkable. PERITONEUM: No ascites or pneumoperitoneum. RETROPERITONEUM: No lymphadenopathy or aortic aneurysm. REPRODUCTIVE ORGANS: Normal sized prostate gland. URINARY BLADDER: No mass or calculus. BONES: No destructive bone lesion. Right hip prosthesis, with large complex  joint effusion. ADDITIONAL COMMENTS: N/A     IMPRESSION  IMPRESSION:  No evidence of active GI bleed. Large amount of stool in the rectum. Large  complex right hip joint effusion could indicate hemarthrosis. Elevated right  hemidiaphragm with right lower lobe atelectasis.     MEDICATIONS GIVEN:  Medications   0.9% sodium chloride infusion 250 mL (not administered)   0.9% sodium chloride infusion 250 mL (not administered)   pantoprazole (PROTONIX) injection 80 mg (not administered)   pantoprazole (PROTONIX) injection 40 mg (not administered)   0.9% sodium chloride infusion (not administered)   iopamidol (ISOVUE-370) 76 % injection 100 mL (not administered)   sodium chloride (NS) flush 10 mL (not administered)   0.9% sodium chloride infusion 250 mL (not administered)   potassium chloride 10 mEq in 100 ml IVPB (not administered)     IMPRESSION:  1. Gastrointestinal hemorrhage with melena    2. Acute blood loss anemia        PLAN:  1. Admit to Hospitalist, Dr. Fauzia Rodriguez    ADMIT NOTE:    5:30 AM  Patient is being admitted to the hospital by Dr. Elvin Reyes. The results of their tests and reasons for their admission have been discussed with the patient and/or available family.  They convey agreement and understanding for the need to be admitted and for the admission diagnosis. This note is prepared by Carolyn Almazan acting as Scribe for Samuel Vora MD.    Samuel Vora MD: This scribe's documentation has been prepared under my direction and personally reviewed by me in its entirety. I confirm that the note above accurately reflects all work, treatment procedures and medical decision makings performed by me.

## 2017-07-14 NOTE — PROGRESS NOTES
GI Progress Note:    S: Patient drank 3/4 of golytely and stool has gone from bloody to brown. Family at bedside. K is 2.8.    O:  Wt Readings from Last 3 Encounters:   07/14/17 89.4 kg (197 lb)   07/10/17 89.4 kg (197 lb)   07/03/17 88.7 kg (195 lb 8.8 oz)     Temp Readings from Last 3 Encounters:   07/14/17 97.8 °F (36.6 °C)   07/12/17 97.7 °F (36.5 °C) (Temporal)   07/10/17 97.5 °F (36.4 °C) (Temporal)     BP Readings from Last 3 Encounters:   07/14/17 133/55   07/12/17 110/58   07/10/17 122/60     Pulse Readings from Last 3 Encounters:   07/14/17 80   07/12/17 88   07/10/17 79     PE:  Gen: awake, pale, WM in NAD. CV: RRR, no murmur  Chest: Lungs cta b/l  Abd: soft, mild distention, nontender, + BS  Lab Results   Component Value Date/Time    Sodium 136 07/14/2017 11:27 AM    Potassium 2.8 07/14/2017 11:27 AM    Chloride 104 07/14/2017 11:27 AM    CO2 24 07/14/2017 11:27 AM    Anion gap 8 07/14/2017 11:27 AM    Glucose 89 07/14/2017 11:27 AM    BUN 12 07/14/2017 11:27 AM    Creatinine 0.78 07/14/2017 11:27 AM    BUN/Creatinine ratio 15 07/14/2017 11:27 AM    GFR est AA >60 07/14/2017 11:27 AM    GFR est non-AA >60 07/14/2017 11:27 AM    Calcium 6.3 07/14/2017 11:27 AM     Lab Results   Component Value Date/Time    WBC 9.7 07/14/2017 04:45 AM    Hemoglobin (POC) 8.8 07/14/2017 04:43 PM    HGB 7.7 07/14/2017 04:38 PM    Hematocrit (POC) 26 07/14/2017 04:43 PM    HCT 21.6 07/14/2017 04:38 PM    PLATELET 177 06/39/3822 04:45 AM    MCV 96.0 07/14/2017 04:45 AM     Impression:    1. Acute GI blood loss anemia with recent sigmoid volvulus s/p decompression with Dr. Neha Landeros.  2.  Protracted hypokalemia  3.   Lactic acidosis    Plan:  -needs to have complete endoscopic evaluation with EGD and Colonoscopy but his current K of 2.8 puts him at risk for sedation with possible adverse event.    -place central line and continue K repletion and txfusion overnight  -plan on EGD and Colonoscopy tomorrow with Dr. Leger Setting: on call ( I have spoken with him and family regarding plan)

## 2017-07-14 NOTE — IP AVS SNAPSHOT
[address] [facility] 
[phone] Patient: Laura Becerril MRN: [id number] QEY:[id number] You are allergic to the following Allergen Reactions Augmentin (Amoxicillin-Pot Clavulanate) Rash Ciprofloxacin Rash Recent Documentation Height Weight BMI Smoking Status 1.829 m 89.4 kg 26.72 kg/m2 Never Smoker Unresulted Labs Order Current Status CHROMIUM In process COBALT, PLASMA In process Emergency Contacts Name Discharge Info Relation Home Work Mobile Tiffany Holm [3] [phone] About your hospitalization You were admitted on:  July 14, 2017 You last received care in the:  [facility] GENERAL SURGERY You were discharged on:  July 19, 2017 Unit phone number:  [phone] Why you were hospitalized Your primary diagnosis was:  Gi Bleed Providers Seen During Your Hospitalizations Provider Role Specialty Primary office phone Rashi Murphy MD Attending Provider Emergency Medicine [phone] Alivia Burden MD Attending Provider Internal Medicine [phone] Varsha Chaudhari MD Attending Provider Internal Medicine [phone] Your Primary Care Physician (PCP) Primary Care Physician Office Phone Office Fax Alton Elise [phone] Follow-up Information Follow up With Details Comments Contact Info [address] [facility]  This is your home health provider  [address] 
1st Floor [facility] [address] [phone] Jonh Riley MD In 1 week follow up CBC, BMP and CXR [place] [address] [facility] 
[phone] Maggie Snell MD In 2 weeks follow up [address] [facility] 
[phone] Carrington Mojica MD In 3 weeks  [address] [facility] 
[phone] Your Appointments Thursday July 27, 2017 To Be Determined NURSING CALL with Ti Can RN  
[facility] ([address] [address] [facility] [address] ([address]) Current Discharge Medication List  
  
START taking these medications Dose & Instructions Dispensing Information Comments Morning Noon Evening Bedtime * OTHER Your last dose was: Your next dose is: CBC BMP fax results to PCP Quantity:  1 Act Refills:  0  
     
   
   
   
  
 * OTHER Your last dose was: Your next dose is: CXR in 3 weeks Follow-up [place] Send results to PCP Quantity:  1 Act Refills:  0  
     
   
   
   
  
 pantoprazole 40 mg tablet Commonly known as:  PROTONIX Your last dose was: Your next dose is:    
   
   
 Dose:  40 mg Take 1 Tab by mouth Daily (before breakfast). Quantity:  30 Tab Refills:  1  
     
   
   
   
  
 * Notice: This list has 2 medication(s) that are the same as other medications prescribed for you. Read the directions carefully, and ask your doctor or other care provider to review them with you. CONTINUE these medications which have NOT CHANGED Dose & Instructions Dispensing Information Comments Morning Noon Evening Bedtime  
 acetaminophen 500 mg tablet Commonly known as:  TYLENOL Your last dose was: Your next dose is:    
   
   
 Dose:  500 mg Take 500 mg by mouth every six (6) hours as needed. Refills:  0  
     
   
   
   
  
 amiodarone 200 mg tablet Commonly known as:  CORDARONE Your last dose was: Your next dose is:    
   
   
 Dose:  200 mg Take 1 Tab by mouth two (2) times a day for 14 days. Then, starting on 7/23, decrease dose to 200 mg by mouth daily in the morning for 14 more days. (28 days total therapy) Quantity:  42 Tab Refills:  0  
     
   
   
   
  
 benzonatate 100 mg capsule Commonly known as:  TESSALON Your last dose was: Your next dose is:    
   
   
 Dose:  100 mg Take 1 Cap by mouth every eight (8) hours as needed for Cough for up to 7 days. Quantity:  15 Cap Refills:  0  
     
   
   
   
  
 FLOMAX 0.4 mg capsule Generic drug:  tamsulosin Your last dose was: Your next dose is:    
   
   
 Dose:  0.4 mg Take 0.4 mg by mouth daily. Refills:  0  
     
   
   
   
  
 guaiFENesin-dextromethorphan 100-10 mg/5 mL syrup Commonly known as:  ROBITUSSIN DM Your last dose was: Your next dose is:    
   
   
 Dose:  10 mL Take 10 mL by mouth every six (6) hours as needed for up to 10 days. Quantity:  1 Bottle Refills:  0 HYDROcodone-acetaminophen 5-325 mg per tablet Commonly known as:  Yulia Homme Your last dose was: Your next dose is:    
   
   
 Dose:  1 Tab Take 1 Tab by mouth every six (6) hours as needed for Pain. Max Daily Amount: 4 Tabs. Quantity:  10 Tab Refills:  0  
     
   
   
   
  
 linaclotide 145 mcg Cap capsule Commonly known as:  Kavita Og Your last dose was: Your next dose is:    
   
   
 Dose:  145 mcg Take 1 Cap by mouth Daily (before breakfast). Quantity:  30 Cap Refills:  0 LORazepam 1 mg tablet Commonly known as:  ATIVAN Your last dose was: Your next dose is: Take  by mouth every four (4) hours as needed. Refills:  0 OXYGEN-AIR DELIVERY SYSTEMS Your last dose was: Your next dose is:    
   
   
 Dose:  2 L  
2 L by Nasal route as needed (sob). concentrator and portable tanks in the home Refills:  0  
     
   
   
   
  
 polyethylene glycol 17 gram packet Commonly known as:  Steven Coma Your last dose was: Your next dose is:    
   
   
 Dose:  17 g Take 1 Packet by mouth daily. Quantity:  30 Packet Refills:  0  
     
   
   
   
  
 potassium chloride SR 20 mEq tablet Commonly known as:  K-TAB Your last dose was: Your next dose is:    
   
   
 Dose:  20 mEq Take 20 mEq by mouth as needed (with bumex use daily). Refills:  0 STOP taking these medications   
 aspirin 81 mg chewable tablet  
   
  
 bumetanide 1 mg tablet Commonly known as:  BUMEX  
   
  
 cephALEXin 500 mg capsule Commonly known as:  KEFLEX  
   
  
 doxycycline 100 mg tablet Commonly known as:  VIBRA-TABS  
   
  
 finasteride 5 mg tablet Commonly known as:  PROSCAR  
   
  
 l.acidoph-B.lactis-B.longum 460 mg (7.5-6- 1.5 bill. cell) Cap cap Commonly known as:  FLORAJEN3  
   
  
 LYRICA 100 mg capsule Generic drug:  pregabalin  
   
  
 metoprolol succinate 50 mg XL tablet Commonly known as:  TOPROL-XL  
   
  
 naproxen 250 mg tablet Commonly known as:  NAPROSYN PriLOSEC 20 mg capsule Generic drug:  omeprazole Where to Get Your Medications These medications were sent to [address], [facility] [address], [address] [place] [address] Phone:  [phone]  
  benzonatate 100 mg capsule  
 guaiFENesin-dextromethorphan 100-10 mg/5 mL syrup  
 pantoprazole 40 mg tablet Information on where to get these meds will be given to you by the nurse or doctor. ! Ask your nurse or doctor about these medications OTHER  
 OTHER Discharge Instructions HOSPITALIST DISCHARGE INSTRUCTIONS 
 
NAME: Kaylan Garza :  1940 MRN:  [id number] Date/Time:  2017 11:45 AM 
 
ADMIT DATE: 2017 DISCHARGE DATE: 2017 · It is important that you take the medication exactly as they are prescribed.   
· Keep your medication in the bottles provided by the pharmacist and keep a list of the medication names, dosages, and times to be taken in your wallet. · Do not take other medications without consulting your doctor. What to do at [facility] Recommended diet:  Cardiac Diet and Low fat, Low cholesterol Recommended activity: Activity as tolerated Check BP twice daily, keep log, notift PCP if BP more then 150/90 or less then 100/60 or symptoms Discuss with PCP restarting proscar stopped in hospital for low BP on standing If you have questions regarding the hospital related prescriptions or hospital related issues please call [facility] at . You can always direct your questions to your primary care doctor if you are unable to reach your hospital physician; your PCP works as an extension of your hospital doctor just like your hospital doctor is an extension of your PCP for your time at the hospital [facility]) If you experience any of the following symptoms then please call your primary care physician or return to the emergency room if you cannot get hold of your doctor: 
 
Fever, chills, nausea, vomiting, or persistent diarrhea Worsening weakness or new problems with your speech or balance Dark stools or visible blood in your stools New Leg swelling or shortness of breath as this could be signs of a clot Additional Instructions: 
 
 
Bring these papers with you to your follow up appointments. The papers will help your doctors be sure to continue the care plan from the hospital. 
 
 
 
 
 
 
Information obtained by : 
I understand that if any problems occur once I am at home I am to contact my physician. I understand and acknowledge receipt of the instructions indicated above. Physician's or R.N.'s Signature                                                                  Date/Time Patient or Representative Signature Discharge Orders None Introducing [facility]! Tamiko Reveles introduces tripJane patient portal. Now you can access parts of your medical record, email your doctor's office, and request medication refills online. 1. In your internet browser, go to [email or web address] 2. Click on the First Time User? Click Here link in the Sign In box. You will see the New Member Sign Up page. 3. Enter your tripJane Access Code exactly as it appears below. You will not need to use this code after youve completed the sign-up process. If you do not sign up before the expiration date, you must request a new code. · tripJane Access Code: [email or web address] Expires: 9/28/2017 11:57 PM 
 
4. Enter the last four digits of your Social Security Number (xxxx) and Date of Birth (mm/dd/yyyy) as indicated and click Submit. You will be taken to the next sign-up page. 5. Create a tripJane ID. This will be your tripJane login ID and cannot be changed, so think of one that is secure and easy to remember. 6. Create a tripJane password. You can change your password at any time. 7. Enter your Password Reset Question and Answer. This can be used at a later time if you forget your password. 8. Enter your e-mail address. You will receive e-mail notification when new information is available in [address] 9. Click Sign Up. You can now view and download portions of your medical record. 10. Click the Download Summary menu link to download a portable copy of your medical information. If you have questions, please visit the Frequently Asked Questions section of the tripJane website. Remember, tripJane is NOT to be used for urgent needs. For medical emergencies, dial 911. Now available from your iPhone and Android! General Information Please provide this summary of care documentation to your next provider. Patient Signature:  ____________________________________________________________ Date:  ____________________________________________________________  
  
Clay Ala Provider Signature:  ____________________________________________________________ Date:  ____________________________________________________________

## 2017-07-14 NOTE — H&P
[place], [address]   HISTORY AND PHYSICAL       Name:  Natasha Canas   MR#:  [id number]   :  1940   Account #:  [de-identified]        Date of Adm:  2017       CHIEF COMPLAINT:  Lower GI bleed. HISTORY OF PRESENT ILLNESS:  The patient is a 73-year-  old gentleman who was recently admitted to the hospital secondary to   acute respiratory failure, right lower lobe community-acquired   pneumonia, history of E. coli UTI prior to arrival, history of sigmoid   volvulus proximal colonic obstruction, history of atrial fibrillation with   rapid ventricular rate, hypokalemia, hypophosphatemia and essential   hypertension, who presents to the hospital today complaining of the   above mentioned symptoms. The patient also has a history of chronic   kidney disease. History was obtained from the patient as well as the   patient's wife. The patient reports that he was at his baseline health   post discharge just recently, and yesterday started to experience some   rectal bleeding. The patient reports initially it was a trickle, but then got   significant and severe. The patient also reports that he had some   dizziness and fatigue associated with the same. This morning, he   woke up and went to the bathroom and the wife reports that   \"everything exploded. \"  The patient reports that \"there was blood   everywhere,\" and he got concerned and decided to come to the   hospital.  In the hospital, the patient was found to have a hemoglobin of   around 6 and was requested to be admitted for further management   and evaluation. The patient reports that this morning he could not get   himself to sit on the toilet bowl and lower himself to the ground and had   a bloody bowel movement. The patient denies falls or injuries.   Per   EMS, the patient was tachycardic on the way to the hospital.  The   patient also reports that since he has been discharged, he had some   cough but denies any expectoration with the same. The patient denies   any other complaints or problems. Denies headache, blurry vision, sore   throat, trouble swallowing or trouble with speech, any chest pain,   shortness of breath, abdominal pain, urinary symptoms, focal or   generalized neurological weakness, recent travel, sick contacts,   nausea, vomiting, hematemesis, hematuria, hemoptysis, or any falls or   injuries. The patient denies any other complaints or problems. PAST MEDICAL HISTORY:  See above. HOME MEDICATIONS   Currently, the patient is on   1. Bumex 1 mg by mouth as needed for edema. 2.  Potassium chloride. 3.  Keflex 500 mg b.i.d. p.r.n. as needed for having oral procedures. 4.  Naprosyn 250 mg as needed. 5.  Tylenol 500 mg as needed. 6.  Miralax. 7.  Robitussin DM. 8.  Tessalon 100 mg every 8 hours as needed for cough. 9.  Probiotic. 10.  Linzess. 11.  Toprol XL 45 mg daily. 12.  Amiodarone 200 mg b.i.d.   13.  Aspirin 81 mg daily. 14.  Lyrica 100 mg b.i.d.   15.  Norco 5/325 mg every 6 hours as needed. 16.  Prilosec 20 mg daily. 17.  Ativan 1 mg q.4h. as needed. 18.  Flomax 0.4 mg daily. 19.  Proscar 5 mg daily. SOCIAL HISTORY:  No history of tobacco abuse. No history of drug   abuse. Occasional alcohol use. Lives at home. ALLERGIES   1.  AUGMENTIN. 2.  CIPROFLOXACIN. REVIEW OF SYMPTOMS:  All symptoms were reviewed and were   found to be essentially negative except for those symptoms mentioned   above. FAMILY HISTORY:  Was discussed, was found to be noncontributory   to the present admission. PHYSICAL EXAMINATION   VITAL SIGNS:  Temperature 97.5, pulse 67, respiratory rate 22, blood   pressure 119/55, pulse ox 100% on room air. GENERAL:  Oriented x3, awake. Pale looking male, appears to be   stated age. HEENT:  Pupils are equal and reactive to light. Dry mucous   membranes. Tympanic membranes are clear. Extraocular muscles are   intact. NECK:  Supple. CHEST:  Clear to auscultation bilaterally. CORONARY:  Regularly irregular. ABDOMEN:  Soft, nontender, nondistended. Bowel sounds are   hyperactive. EXTREMITIES:  No clubbing, no cyanosis. Trace edema right greater   than left. NEUROPSYCHIATRIC:  Pleasant mood and affect. Sensory within   normal limits. Cranial nerves 2 through 12 are grossly intact. Moves all   4. Strength 5/5. SKIN:  Warm. LABS:  WBC 9.7, hemoglobin 6, hematocrit 16.9, platelets 472. Sodium 139, potassium 2.5, chloride 105, bicarbonate 24, anion gap   10, glucose 159, BUN 13, creatinine 0.99, calcium 6.7, bili total 0.4,   protein 4.2, albumin 1.9, ALT 10, AST 7, alkaline phosphatase 30,   lactic acid 2.9. CT of the abdomen and pelvis shows no evidence of   active GI bleed, large amount of stool in the rectum, large complex   right hip joint effusion would indicate hemarthrosis, elevated   right hemidiaphragm with right lower lobe atelectasis. X-ray of the   chest shows unchanged elevation of the right hemidiaphragm with   bibasilar atelectasis. EKG has been ordered, was not done in the ER. ASSESSMENT AND PLAN   1. Lower gastrointestinal bleed, appears to be diverticular in nature. The patient will be admitted to an ICU bed. The patient recently had a   volvulus decompression done about 2 weeks back with GI. The patient   was transfused 2 units UNMATCHED BLOOD. The patient will be   closely monitored for any acute transfusion reactions. We will monitor H and H   every 8 hours. GI has been consulted, and their recommendations will   be incorporated. Will hold aspirin for now. Gentle IV hydration and   further intervention per hospital course. Will continue to closely   monitor. The patient is in need of a colonoscopy and/endoscopy. Will   defer to GI.   I spoke with the patient's wife who reported that the   patient had a recent colonoscopy done in January 2017 which showed   some polyps but no other acute abnormality. 2.  Acute blood loss anemia most likely secondary to #1. The patient   was transfused 2 units RBCs as described above. Will closely monitor   H and H every 8 hours. Transfuse as needed. Further intervention will   be per hospital course and monitor the patient on telemetry bed in   critical care setting. The patient is hemodynamically stable. Gentle IV   hydration and further intervention per hospital course. We will   reassess as needed. 3.  Hemarthrosis of the hip. Unclear etiology. Ortho has been   consulted. Their recommendations will be incorporated. Closely   evaluate their input and further intervention will be per hospital course. Provide pain control and may consider getting PT consult with   discussion with Orthopedics. 4.  Atrial fibrillation. Currently well controlled. Will hold aspirin for now   and monitor the patient on a telemetry bed. Further intervention will be   per hospital course. 5.  Hypokalemia. We will replace potassium. The patient will be on   telemetry bed. Further intervention will be per hospital course. 6.  Gastrointestinal and deep venous thrombosis prophylaxis. The   patient will be on SCDs.           Houston Aguilera MD MM / NB   D:  07/14/2017   08:49   T:  07/14/2017   09:48   Job #:  [id number]

## 2017-07-15 ENCOUNTER — APPOINTMENT (OUTPATIENT)
Dept: GENERAL RADIOLOGY | Age: 77
DRG: 393 | End: 2017-07-15
Attending: INTERNAL MEDICINE
Payer: MEDICARE

## 2017-07-15 LAB
ANION GAP BLD CALC-SCNC: 10 MMOL/L (ref 5–15)
ANION GAP BLD CALC-SCNC: 8 MMOL/L (ref 5–15)
ATRIAL RATE: 66 BPM
BUN SERPL-MCNC: 8 MG/DL (ref 6–20)
BUN SERPL-MCNC: 9 MG/DL (ref 6–20)
BUN/CREAT SERPL: 11 (ref 12–20)
BUN/CREAT SERPL: 12 (ref 12–20)
CALCIUM SERPL-MCNC: 6.5 MG/DL (ref 8.5–10.1)
CALCIUM SERPL-MCNC: 6.8 MG/DL (ref 8.5–10.1)
CALCULATED P AXIS, ECG09: 50 DEGREES
CALCULATED R AXIS, ECG10: -5 DEGREES
CALCULATED T AXIS, ECG11: 123 DEGREES
CHLORIDE SERPL-SCNC: 107 MMOL/L (ref 97–108)
CHLORIDE SERPL-SCNC: 110 MMOL/L (ref 97–108)
CO2 SERPL-SCNC: 23 MMOL/L (ref 21–32)
CO2 SERPL-SCNC: 23 MMOL/L (ref 21–32)
CREAT SERPL-MCNC: 0.7 MG/DL (ref 0.7–1.3)
CREAT SERPL-MCNC: 0.73 MG/DL (ref 0.7–1.3)
DIAGNOSIS, 93000: NORMAL
GLUCOSE SERPL-MCNC: 87 MG/DL (ref 65–100)
GLUCOSE SERPL-MCNC: 92 MG/DL (ref 65–100)
HCT VFR BLD AUTO: 21 % (ref 36.6–50.3)
HCT VFR BLD AUTO: 22.2 % (ref 36.6–50.3)
HCT VFR BLD AUTO: 23.1 % (ref 36.6–50.3)
HGB BLD-MCNC: 7.6 G/DL (ref 12.1–17)
HGB BLD-MCNC: 7.8 G/DL (ref 12.1–17)
HGB BLD-MCNC: 8.2 G/DL (ref 12.1–17)
MAGNESIUM SERPL-MCNC: 2.8 MG/DL (ref 1.6–2.4)
P-R INTERVAL, ECG05: 190 MS
POTASSIUM SERPL-SCNC: 2.9 MMOL/L (ref 3.5–5.1)
POTASSIUM SERPL-SCNC: 3.3 MMOL/L (ref 3.5–5.1)
Q-T INTERVAL, ECG07: 528 MS
QRS DURATION, ECG06: 160 MS
QTC CALCULATION (BEZET), ECG08: 553 MS
SODIUM SERPL-SCNC: 140 MMOL/L (ref 136–145)
SODIUM SERPL-SCNC: 141 MMOL/L (ref 136–145)
VENTRICULAR RATE, ECG03: 66 BPM

## 2017-07-15 PROCEDURE — 74000 XR ABD (KUB): CPT

## 2017-07-15 PROCEDURE — C9113 INJ PANTOPRAZOLE SODIUM, VIA: HCPCS | Performed by: EMERGENCY MEDICINE

## 2017-07-15 PROCEDURE — 77010033678 HC OXYGEN DAILY

## 2017-07-15 PROCEDURE — 65610000006 HC RM INTENSIVE CARE

## 2017-07-15 PROCEDURE — 74011250637 HC RX REV CODE- 250/637: Performed by: FAMILY MEDICINE

## 2017-07-15 PROCEDURE — 74011000258 HC RX REV CODE- 258: Performed by: INTERNAL MEDICINE

## 2017-07-15 PROCEDURE — 83735 ASSAY OF MAGNESIUM: CPT | Performed by: INTERNAL MEDICINE

## 2017-07-15 PROCEDURE — 74011000250 HC RX REV CODE- 250: Performed by: EMERGENCY MEDICINE

## 2017-07-15 PROCEDURE — 3331090002 HH PPS REVENUE DEBIT

## 2017-07-15 PROCEDURE — 3331090001 HH PPS REVENUE CREDIT

## 2017-07-15 PROCEDURE — 85018 HEMOGLOBIN: CPT | Performed by: SPECIALIST

## 2017-07-15 PROCEDURE — 80048 BASIC METABOLIC PNL TOTAL CA: CPT | Performed by: FAMILY MEDICINE

## 2017-07-15 PROCEDURE — 94640 AIRWAY INHALATION TREATMENT: CPT

## 2017-07-15 PROCEDURE — 74011250637 HC RX REV CODE- 250/637: Performed by: SPECIALIST

## 2017-07-15 PROCEDURE — 74011250636 HC RX REV CODE- 250/636: Performed by: FAMILY MEDICINE

## 2017-07-15 PROCEDURE — 0W3P8ZZ CONTROL BLEEDING IN GASTROINTESTINAL TRACT, VIA NATURAL OR ARTIFICIAL OPENING ENDOSCOPIC: ICD-10-PCS | Performed by: INTERNAL MEDICINE

## 2017-07-15 PROCEDURE — 74011250636 HC RX REV CODE- 250/636: Performed by: EMERGENCY MEDICINE

## 2017-07-15 PROCEDURE — P9016 RBC LEUKOCYTES REDUCED: HCPCS | Performed by: EMERGENCY MEDICINE

## 2017-07-15 PROCEDURE — 74011250637 HC RX REV CODE- 250/637: Performed by: INTERNAL MEDICINE

## 2017-07-15 PROCEDURE — 36430 TRANSFUSION BLD/BLD COMPNT: CPT

## 2017-07-15 PROCEDURE — 76040000007: Performed by: INTERNAL MEDICINE

## 2017-07-15 PROCEDURE — 77030010936 HC CLP LIG BSC -C: Performed by: INTERNAL MEDICINE

## 2017-07-15 PROCEDURE — 74011250636 HC RX REV CODE- 250/636: Performed by: INTERNAL MEDICINE

## 2017-07-15 PROCEDURE — 74011000250 HC RX REV CODE- 250: Performed by: SPECIALIST

## 2017-07-15 PROCEDURE — 80048 BASIC METABOLIC PNL TOTAL CA: CPT | Performed by: INTERNAL MEDICINE

## 2017-07-15 PROCEDURE — 36415 COLL VENOUS BLD VENIPUNCTURE: CPT | Performed by: SPECIALIST

## 2017-07-15 RX ORDER — LORAZEPAM 2 MG/ML
1 INJECTION INTRAMUSCULAR ONCE
Status: COMPLETED | OUTPATIENT
Start: 2017-07-15 | End: 2017-07-15

## 2017-07-15 RX ORDER — TAMSULOSIN HYDROCHLORIDE 0.4 MG/1
0.4 CAPSULE ORAL DAILY
Status: DISCONTINUED | OUTPATIENT
Start: 2017-07-16 | End: 2017-07-19 | Stop reason: HOSPADM

## 2017-07-15 RX ORDER — DEXTROMETHORPHAN/PSEUDOEPHED 2.5-7.5/.8
1.2 DROPS ORAL
Status: DISCONTINUED | OUTPATIENT
Start: 2017-07-15 | End: 2017-07-19 | Stop reason: HOSPADM

## 2017-07-15 RX ORDER — SODIUM CHLORIDE 0.9 % (FLUSH) 0.9 %
5-10 SYRINGE (ML) INJECTION EVERY 8 HOURS
Status: COMPLETED | OUTPATIENT
Start: 2017-07-15 | End: 2017-07-15

## 2017-07-15 RX ORDER — MIDAZOLAM HYDROCHLORIDE 1 MG/ML
1-10 INJECTION, SOLUTION INTRAMUSCULAR; INTRAVENOUS
Status: DISCONTINUED | OUTPATIENT
Start: 2017-07-15 | End: 2017-07-19 | Stop reason: HOSPADM

## 2017-07-15 RX ORDER — FLUMAZENIL 0.1 MG/ML
0.2 INJECTION INTRAVENOUS
Status: ACTIVE | OUTPATIENT
Start: 2017-07-15 | End: 2017-07-15

## 2017-07-15 RX ORDER — POTASSIUM CHLORIDE 20 MEQ/1
40 TABLET, EXTENDED RELEASE ORAL
Status: COMPLETED | OUTPATIENT
Start: 2017-07-15 | End: 2017-07-15

## 2017-07-15 RX ORDER — NALOXONE HYDROCHLORIDE 0.4 MG/ML
0.4 INJECTION, SOLUTION INTRAMUSCULAR; INTRAVENOUS; SUBCUTANEOUS
Status: ACTIVE | OUTPATIENT
Start: 2017-07-15 | End: 2017-07-15

## 2017-07-15 RX ORDER — MAGNESIUM SULFATE HEPTAHYDRATE 500 MG/ML
4 INJECTION, SOLUTION INTRAMUSCULAR; INTRAVENOUS ONCE
Status: DISCONTINUED | OUTPATIENT
Start: 2017-07-15 | End: 2017-07-15

## 2017-07-15 RX ORDER — GUAIFENESIN/DEXTROMETHORPHAN 100-10MG/5
10 SYRUP ORAL
Status: DISCONTINUED | OUTPATIENT
Start: 2017-07-15 | End: 2017-07-19 | Stop reason: HOSPADM

## 2017-07-15 RX ORDER — SODIUM CHLORIDE 0.9 % (FLUSH) 0.9 %
5-10 SYRINGE (ML) INJECTION AS NEEDED
Status: ACTIVE | OUTPATIENT
Start: 2017-07-15 | End: 2017-07-15

## 2017-07-15 RX ORDER — POTASSIUM CHLORIDE 750 MG/1
40 TABLET, FILM COATED, EXTENDED RELEASE ORAL
Status: COMPLETED | OUTPATIENT
Start: 2017-07-15 | End: 2017-07-15

## 2017-07-15 RX ORDER — POTASSIUM CHLORIDE 7.45 MG/ML
10 INJECTION INTRAVENOUS
Status: COMPLETED | OUTPATIENT
Start: 2017-07-15 | End: 2017-07-15

## 2017-07-15 RX ORDER — SODIUM CHLORIDE 9 MG/ML
25 INJECTION, SOLUTION INTRAVENOUS CONTINUOUS
Status: DISPENSED | OUTPATIENT
Start: 2017-07-15 | End: 2017-07-15

## 2017-07-15 RX ORDER — ATROPINE SULFATE 0.1 MG/ML
0.5 INJECTION INTRAVENOUS
Status: ACTIVE | OUTPATIENT
Start: 2017-07-15 | End: 2017-07-15

## 2017-07-15 RX ORDER — MIDAZOLAM HYDROCHLORIDE 5 MG/ML
1-10 INJECTION INTRAMUSCULAR; INTRAVENOUS ONCE
Status: DISCONTINUED | OUTPATIENT
Start: 2017-07-15 | End: 2017-07-15

## 2017-07-15 RX ORDER — METOPROLOL SUCCINATE 25 MG/1
25 TABLET, EXTENDED RELEASE ORAL DAILY
Status: DISCONTINUED | OUTPATIENT
Start: 2017-07-16 | End: 2017-07-16

## 2017-07-15 RX ORDER — PANTOPRAZOLE SODIUM 40 MG/1
40 TABLET, DELAYED RELEASE ORAL
Status: DISCONTINUED | OUTPATIENT
Start: 2017-07-16 | End: 2017-07-19 | Stop reason: HOSPADM

## 2017-07-15 RX ORDER — FENTANYL CITRATE 50 UG/ML
25-200 INJECTION, SOLUTION INTRAMUSCULAR; INTRAVENOUS
Status: DISCONTINUED | OUTPATIENT
Start: 2017-07-15 | End: 2017-07-19 | Stop reason: HOSPADM

## 2017-07-15 RX ORDER — MIDAZOLAM HYDROCHLORIDE 1 MG/ML
10 INJECTION, SOLUTION INTRAMUSCULAR; INTRAVENOUS
Status: DISCONTINUED | OUTPATIENT
Start: 2017-07-15 | End: 2017-07-16

## 2017-07-15 RX ORDER — MAGNESIUM SULFATE HEPTAHYDRATE 500 MG/ML
4 INJECTION, SOLUTION INTRAMUSCULAR; INTRAVENOUS
Status: DISCONTINUED | OUTPATIENT
Start: 2017-07-15 | End: 2017-07-15

## 2017-07-15 RX ORDER — AMIODARONE HYDROCHLORIDE 200 MG/1
200 TABLET ORAL EVERY 12 HOURS
Status: DISCONTINUED | OUTPATIENT
Start: 2017-07-15 | End: 2017-07-19 | Stop reason: HOSPADM

## 2017-07-15 RX ORDER — FENTANYL CITRATE 50 UG/ML
200 INJECTION, SOLUTION INTRAMUSCULAR; INTRAVENOUS
Status: DISCONTINUED | OUTPATIENT
Start: 2017-07-15 | End: 2017-07-16

## 2017-07-15 RX ORDER — FINASTERIDE 5 MG/1
5 TABLET, FILM COATED ORAL DAILY
Status: DISCONTINUED | OUTPATIENT
Start: 2017-07-16 | End: 2017-07-18

## 2017-07-15 RX ORDER — MAGNESIUM SULFATE HEPTAHYDRATE 40 MG/ML
4 INJECTION, SOLUTION INTRAVENOUS
Status: COMPLETED | OUTPATIENT
Start: 2017-07-15 | End: 2017-07-15

## 2017-07-15 RX ORDER — BENZONATATE 100 MG/1
100 CAPSULE ORAL
Status: DISCONTINUED | OUTPATIENT
Start: 2017-07-15 | End: 2017-07-19 | Stop reason: HOSPADM

## 2017-07-15 RX ORDER — EPINEPHRINE 0.1 MG/ML
1 INJECTION INTRACARDIAC; INTRAVENOUS
Status: ACTIVE | OUTPATIENT
Start: 2017-07-15 | End: 2017-07-15

## 2017-07-15 RX ORDER — SODIUM CHLORIDE 9 MG/ML
250 INJECTION, SOLUTION INTRAVENOUS AS NEEDED
Status: DISCONTINUED | OUTPATIENT
Start: 2017-07-15 | End: 2017-07-19 | Stop reason: HOSPADM

## 2017-07-15 RX ADMIN — MUPIROCIN: 20 OINTMENT TOPICAL at 08:58

## 2017-07-15 RX ADMIN — IPRATROPIUM BROMIDE AND ALBUTEROL SULFATE 3 ML: .5; 3 SOLUTION RESPIRATORY (INHALATION) at 19:55

## 2017-07-15 RX ADMIN — Medication 10 ML: at 14:42

## 2017-07-15 RX ADMIN — FENTANYL CITRATE 50 MCG: 50 INJECTION, SOLUTION INTRAMUSCULAR; INTRAVENOUS at 15:36

## 2017-07-15 RX ADMIN — MUPIROCIN: 20 OINTMENT TOPICAL at 17:47

## 2017-07-15 RX ADMIN — CALCIUM GLUCONATE 2 G: 94 INJECTION, SOLUTION INTRAVENOUS at 08:51

## 2017-07-15 RX ADMIN — POTASSIUM CHLORIDE 10 MEQ: 10 INJECTION, SOLUTION INTRAVENOUS at 08:39

## 2017-07-15 RX ADMIN — SODIUM CHLORIDE 40 MG: 9 INJECTION INTRAMUSCULAR; INTRAVENOUS; SUBCUTANEOUS at 08:41

## 2017-07-15 RX ADMIN — Medication 10 ML: at 06:16

## 2017-07-15 RX ADMIN — MIDAZOLAM HYDROCHLORIDE 2 MG: 1 INJECTION INTRAMUSCULAR; INTRAVENOUS at 15:42

## 2017-07-15 RX ADMIN — SODIUM CHLORIDE 25 ML/HR: 900 INJECTION, SOLUTION INTRAVENOUS at 15:04

## 2017-07-15 RX ADMIN — IPRATROPIUM BROMIDE AND ALBUTEROL SULFATE 3 ML: .5; 3 SOLUTION RESPIRATORY (INHALATION) at 14:02

## 2017-07-15 RX ADMIN — MIDAZOLAM HYDROCHLORIDE 3 MG: 1 INJECTION INTRAMUSCULAR; INTRAVENOUS at 15:38

## 2017-07-15 RX ADMIN — POTASSIUM CHLORIDE 40 MEQ: 750 TABLET, FILM COATED, EXTENDED RELEASE ORAL at 07:34

## 2017-07-15 RX ADMIN — POTASSIUM CHLORIDE 40 MEQ: 1500 TABLET, EXTENDED RELEASE ORAL at 19:08

## 2017-07-15 RX ADMIN — POTASSIUM CHLORIDE 10 MEQ: 10 INJECTION, SOLUTION INTRAVENOUS at 09:35

## 2017-07-15 RX ADMIN — MAGNESIUM SULFATE HEPTAHYDRATE 4 G: 40 INJECTION, SOLUTION INTRAVENOUS at 10:11

## 2017-07-15 RX ADMIN — FENTANYL CITRATE 50 MCG: 50 INJECTION, SOLUTION INTRAMUSCULAR; INTRAVENOUS at 15:45

## 2017-07-15 RX ADMIN — AMIODARONE HYDROCHLORIDE 200 MG: 200 TABLET ORAL at 20:43

## 2017-07-15 RX ADMIN — SODIUM CHLORIDE AND POTASSIUM CHLORIDE: 9; 1.49 INJECTION, SOLUTION INTRAVENOUS at 00:20

## 2017-07-15 RX ADMIN — LORAZEPAM 1 MG: 1 TABLET ORAL at 20:43

## 2017-07-15 RX ADMIN — LORAZEPAM 1 MG: 2 INJECTION INTRAMUSCULAR; INTRAVENOUS at 14:40

## 2017-07-15 RX ADMIN — SODIUM CHLORIDE AND POTASSIUM CHLORIDE: 9; 1.49 INJECTION, SOLUTION INTRAVENOUS at 14:53

## 2017-07-15 RX ADMIN — POTASSIUM CHLORIDE 10 MEQ: 10 INJECTION, SOLUTION INTRAVENOUS at 07:32

## 2017-07-15 NOTE — H&P
Date of Surgery Update:  Gera Adam was seen and examined. History and physical has been reviewed. The patient has been examined.  There have been no significant clinical changes since the completion of the originally dated History and Physical.    Signed By: Marc Wesley MD     July 15, 2017 3:25 PM

## 2017-07-15 NOTE — PROGRESS NOTES
Hospitalist Progress Note    NAME: Devon Rivera   :  1940   MRN:  [id number]       Assessment / Plan:    Addendum: day's event's reviewed, some home PO meds restarted (amiodarone, metoprolol, prostate meds, PPI changed to PO and IV discontinued) and most recent Hgb 7.6 thus will transfuse 1 additional unit pRBC's  _______________    GI Bleeding: Suspect Diverticular Bleeding  -continue ICU monitoring as patient is at high risk for further deterioration  -continue IV PPI, continue NPO, hopefully can have EGD/Colonoscopy today; appreciate GI assistance  -continue IVF's    Acute Blood Loss Anemia  -s/p 4 units of blood, continue ICU monitoring and check Hgb every 6 hours as ordered    Incidental note of Right hip Effusion on CT A/P from   -will wait until GIB is no longer an issues and consult Orthopedic Surgery    Atrial Fibrillation  -hold ASA, restart PO Amiodarone after Endoscopy    Severe Hypokalemia:  -replete aggressively and monitor  -check stat mangesium    Mild Hypocalcemia: calcium corrects to 8.2  - will give IV calcium     Hypertension  -hold home Metoprolol at this time    Body mass index is 26.72 kg/(m^2).     Code status: Full  Prophylaxis: SCD's  Recommended Disposition: TBD     Subjective:     Chief Complaint / Reason for Physician Visit: follow-up GIB/Anemia  Patient completed colonoscopy prep yesterday but endoscopy held due to electrolyte derrangements  Patient with 150-200 mL somewhat clotted hematochezia this am (discussed with RN)  Patient denies any pain  Potassium still low  Femoral Central line in place    Review of Systems:  Symptom Y/N Comments  Symptom Y/N Comments   Fever/Chills n   Chest Pain n    Poor Appetite    Edema     Cough    Abdominal Pain n    Sputum    Joint Pain     SOB/ANDERSON n   Pruritis/Rash     Nausea/vomit    Tolerating PT/OT     Diarrhea    Tolerating Diet     Constipation    Other n lightheadedness     Could NOT obtain due to:      Objective:     VITALS:   Last 24hrs VS reviewed since prior progress note.  Most recent are:  Patient Vitals for the past 24 hrs:   Temp Pulse Resp BP SpO2   07/15/17 0730 98 °F (36.7 °C) 78 19 153/59 100 %   07/15/17 0700 - 76 24 141/54 100 %   07/15/17 0600 - 77 21 153/61 100 %   07/15/17 0500 - 79 23 134/54 100 %   07/15/17 0400 99 °F (37.2 °C) 77 21 129/55 100 %   07/15/17 0300 - 72 19 130/49 100 %   07/15/17 0230 98.6 °F (37 °C) 72 20 132/49 100 %   07/15/17 0215 98.5 °F (36.9 °C) 72 19 131/48 100 %   07/15/17 0200 - 73 20 133/52 100 %   07/15/17 0145 98.4 °F (36.9 °C) 75 20 131/49 100 %   07/15/17 0130 98.4 °F (36.9 °C) 78 21 126/56 100 %   07/15/17 0115 98.3 °F (36.8 °C) 73 20 137/50 100 %   07/15/17 0100 97.8 °F (36.6 °C) 77 23 138/51 100 %   07/15/17 0045 97.6 °F (36.4 °C) 73 20 133/41 100 %   07/15/17 0015 97.6 °F (36.4 °C) 80 27 125/61 100 %   07/15/17 0000 97.8 °F (36.6 °C) 84 29 156/56 100 %   07/14/17 2345 97.7 °F (36.5 °C) 82 28 144/88 99 %   07/14/17 2330 97.7 °F (36.5 °C) 82 27 140/50 98 %   07/14/17 2316 97.6 °F (36.4 °C) 81 23 147/52 98 %   07/14/17 2315 97.6 °F (36.4 °C) - - - -   07/14/17 2300 - 83 16 (!) 152/37 99 %   07/14/17 2200 - 82 21 120/51 99 %   07/14/17 2100 - 83 29 133/52 99 %   07/14/17 2013 - - - - 98 %   07/14/17 2000 98.2 °F (36.8 °C) 80 25 145/51 99 %   07/14/17 1900 - 80 27 149/50 98 %   07/14/17 1800 - 79 26 138/54 97 %   07/14/17 1700 - 78 23 140/52 99 %   07/14/17 1633 - 80 17 133/55 98 %   07/14/17 1600 97.3 °F (36.3 °C) 76 18 133/55 99 %   07/14/17 1500 - 72 27 128/45 98 %   07/14/17 1400 - 70 24 134/53 100 %   07/14/17 1338 - - - - 100 %   07/14/17 1300 - 68 18 123/56 99 %   07/14/17 1200 97.8 °F (36.6 °C) 72 17 131/53 99 %   07/14/17 1100 - 72 19 124/61 100 %   07/14/17 1000 - 69 22 111/47 99 %   07/14/17 0913 - 71 15 - 100 %   07/14/17 0911 97.6 °F (36.4 °C) - - 134/40 -   07/14/17 0844 - 65 21 128/54 99 %       Intake/Output Summary (Last 24 hours) at 07/15/17 0759  Last data filed at 07/15/17 0700 Gross per 24 hour   Intake          3094.55 ml   Output                0 ml   Net          3094.55 ml        PHYSICAL EXAM:  General: WD, WN. Alert, cooperative, no acute distress    EENT:  EOMI. Anicteric sclerae. MM dry  Resp:  CTA bilaterally, no wheezing or rales. No accessory muscle use  CV:  Regular  rhythm,  Trace BLE edema  GI:  Soft, Non distended, Non tender. Hypoactive Bowel sounds  Neurologic:  Alert and oriented X 3, normal speech,   Psych:   Good insight. Not anxious nor agitated  Skin:  No rashes. No jaundice    Reviewed most current lab test results and cultures  YES  Reviewed most current radiology test results   YES  Review and summation of old records today    NO  Reviewed patient's current orders and MAR    YES  PMH/SH reviewed - no change compared to H&P  ________________________________________________________________________  Care Plan discussed with:    Comments   Patient x    Family      RN x    Care Manager     Consultant                        Multidiciplinary team rounds were held today with , nursing, pharmacist and clinical coordinator. Patient's plan of care was discussed; medications were reviewed and discharge planning was addressed. ________________________________________________________________________  Total NON critical care TIME:  30   Minutes    Total CRITICAL CARE TIME Spent:   Minutes non procedure based      Comments   >50% of visit spent in counseling and coordination of care     ________________________________________________________________________  Elizabeth Patiño MD     Procedures: see electronic medical records for all procedures/Xrays and details which were not copied into this note but were reviewed prior to creation of Plan. LABS:  I reviewed today's most current labs and imaging studies.   Pertinent labs include:  Recent Labs      07/15/17   0423  07/14/17   2215  07/14/17   1638   07/14/17   0445   WBC   --    --    --    --   9.7   HGB 7. 8*  6.8*  7.7*   < >  6.0*   HCT  22.2*  18.9*  21.6*   < >  16.9*   PLT   --    --    --    --   330    < > = values in this interval not displayed.      Recent Labs      07/15/17   0423  07/14/17   2217  07/14/17   1127  07/14/17   0445   NA  140   --   136  139   K  2.9*  2.9*  2.8*  2.5*   CL  107   --   104  105   CO2  23   --   24  24   GLU  87   --   89  159*   BUN  9   --   12  13   CREA  0.73   --   0.78  0.99   CA  6.5*   --   6.3*  6.7*   ALB   --    --    --   1.9*   TBILI   --    --    --   0.4   SGOT   --    --    --   7*   ALT   --    --    --   10*       Signed: Carlos Middleton MD

## 2017-07-15 NOTE — PROGRESS NOTES
Patient expressing how nervous he is for procedure because this is \"a different setting\" than his last endoscopy procedures. He was given 1 mg of Ativan by primary nurse, Graeme Doan. Distraction provided. Patient verbalizes \"a little ease\" in his anxiety. Will continue to provide distraction and calming presence.

## 2017-07-15 NOTE — PROGRESS NOTES
0700:  Bedside handoff report received from 9 [facility], [address] [place] [address].      1055:   Pt with large maroon, red bowel movement; Dr. Herlinda Guy updated. H and H sent to lab.    1330:  Dr. Herlinda Guy updated on patient's H and H, BMP, Mag. No new orders; patient to have Colonoscopy around 1500. Pt updated on plan, agreeable. 1425:  Pt c/o anxiety anticipating colonoscopy. S/w Dr. Herlinda Guy; orders received for lorazepam 1 mg IV now. 1855:  Pt's Hgb noted to be 7.6; Dr. Tania Metcalf updated. Orders received to transfuse one unit.      1915: Bedside handoff report given to Adelaide Tipton., JOSE EDUARDO Grider RN

## 2017-07-15 NOTE — ROUTINE PROCESS
TRANSFER - IN REPORT:    Verbal report received from Bryan Hsu RN (name) on Abhi Chaidez  being received from CCU room  (unit) for ordered procedure      Report consisted of patients Situation, Background, Assessment and   Recommendations(SBAR). Information from the following report(s) SBAR, Procedure Summary, Intake/Output, MAR, Recent Results and Cardiac Rhythm Sinus Tach (pt does have Pacemaker no spikes seen) was reviewed with the receiving nurse. Opportunity for questions and clarification was provided. Assessment completed upon patients arrival to unit and care assumed.

## 2017-07-15 NOTE — PROGRESS NOTES
7/15/2017  10:16 AM    INTENSIVIST PROGRESS NOTE:     Patient seen and evaluated   79 yo male presented with 2 days hx of rectal bleeding, BRBPR  Received blood overnight. Plans for colonoscopy if electroltyes improved.     Visit Vitals    /56 (BP 1 Location: Left arm, BP Patient Position: Supine)    Pulse 84    Temp 98.4 °F (36.9 °C)    Resp 29    Wt 89.4 kg (197 lb)    SpO2 100%    BMI 26.72 kg/m2       General: no distress  Heent:  Nc/at eomi  CV: RRR  Lungs: clear  abd  S/nt/nd  Ext:  No c/c/e  Neuro:  Cn 2-12 intact  Skin:  No rashes    CXR: bibasilar atx    Labs reviewed    A/P:  -GI bleed  -recent sigmoid volvulus  -anemia    NPO  IV protonix  Electrolyte repletion  Transfuse as needed  Colonoscopy today  Will assist on disposition planning when stable for transfer  Mono Marie MD

## 2017-07-15 NOTE — PROGRESS NOTES
1900 Bedside report received from Kika Cantu, RN via [employer]. CVC placement thru femoral vein in progress. Infusing K 20 mEq and NS thru PIV. 1930 CVC placement complete. OK to use, fluids with 20 mEq K switched to CVC. 2215 Hgb at 6.8. Will infuse 2 units of PRBC.    2316 First unit of PRBC started. VSS.    0100 First unit complete. VSS. Will continue to monitor. 0115 Second unit started. VSS.    0230 Second unit complete. VSS. No adverse reaction noted. Will continue to monitor. 1671 Had a bloody stool, MD on call paged. 0073 Dr. Guru Laureano called back and informed re: bloody stool, K of 2.9. NNO received.

## 2017-07-15 NOTE — PROGRESS NOTES
GI brief note    S:  Bloody clot per rectum, small, this AM.  Feels fine. Wants procedures. Reviewed procedures with patient and son. Can delay procedures until tomorrow if needed. Visit Vitals    /59 (BP 1 Location: Left arm, BP Patient Position: Sitting)    Pulse 78    Temp 98 °F (36.7 °C)    Resp 19    Wt 89.4 kg (197 lb)    SpO2 100%    BMI 26.72 kg/m2     GEN: NAD, well-appearing  CV: RRR  GI: soft, NT/ND, no HSM, NABS  NEURO: grossly intact        A/P   77 yo M with rectal bleeding, hypokalemia prevented procedure yesterday, no appreciable change with replacement overnight. Recent volvulus, sigmoid, decompressed last week.      -- needs IV mag, as soon as can be given, ordered  -- still, may need to delay procedures until electrolytes repleted, which may mean tomorrow  -- will follow up later

## 2017-07-15 NOTE — PROCEDURES
NAME:  Diane Hardy   :   1940   MRN:   [id number]     Date/Time:  7/15/2017 4:21 PM    Colonoscopy Operative Report    Procedure Type:  Colonoscopy with control of bleeding, hemoclip placement     Indications: BRPBR  Pre-operative Diagnosis: see indication above  Post-operative Diagnosis:  See findings below  :  Evonne Moore MD  Referring Provider: -Abhijeet Zuleta MD    Exam:  Airway: clear, no airway problems anticipated  Heart: RRR, without gallops or rubs  Lungs: clear bilaterally without wheezes, crackles, or rhonchi  Abdomen: soft, nontender, nondistended, bowel sounds present  Mental Status: awake, alert and oriented to person, place and time    Sedation:  MAC anesthesia Propofol  Procedure Details:  After informed consent was obtained with all risks and benefits of procedure explained and preoperative exam completed, the patient was taken to the endoscopy suite and placed in the left lateral decubitus position. Upon sequential sedation as per above, a digital rectal exam was performed demonstrating internal and external hemorrhoids. The Olympus videocolonoscope  was inserted in the rectum and carefully advanced to the ascending colon. The quality of preparation was fair. The colonoscope was slowly withdrawn with careful evaluation between folds. Retroflexion in the rectum was completed demonstrating internal and external hemorrhoids. Findings:   ANUS: Anal exam reveals no masses but small hemorrhoids, sphincter tone is normal, to a little lax. RECTUM:   -- reddish maroon colored liquid in rectum, suctioned and no underlying lesion  -- note is made of very dilated capacious rectum.   -- small non-bleeding hemorrhoids on retroflexion  SIGMOID COLON:   -- reddish hued clear fluid present throughout, without any signs of marcos bleeding.   -- mucosa is normal, but again, colon is quite capacious and appears to be chronically dilated  DESCENDING COLON:   -- splenic flexure with a focal area of ulceration; 3 discrete ulcers, one larger ulcer with a tiny clot overlying a visible vessel, treated with hemoclip placement. -- No further active bleeding observed. TRANSVERSE COLON: tortuous, dilated and capacious colon without bleeding source and less red/more yellow-hued liquid stool present, especially proximally. ASCENDING COLON: yellow-brown semi-solid stool seen in proximal ascending colon cecum, dilated and tortuous and no red blood or signs of recent bleeding. CECUM: not fully visualized, but yellow-brown stool seen from afar sitting within cecum. Specimen Removed: none  Complications:  None. EBL:     None. Impression:    -- colonic ulcers in the splenic flexure, most consistent in appearance with ischemic colitis. One of these ulcers with a visible vessel and surrounding and more distal mucosa with most red-colored fluid. -- impressively tortuous, dilated and capacious colon, and ultimately could not intubate the cecum, despite turning patient on his back on his right side. Further attempts were aborted as no further bloody fluid noted upstream from proximal transverse colon. -- hemorrhoids, non-bleeding    Recommendations:   -- support with PRBCs  -- maintain blood pressure  -- KUB to localize hemoclip, which seemed to be in splenic flexure/descending colon  -- future bleeding may require IR embolization to treat, targeting hemoclip site. Discharge Disposition:   To remain in ICU      Sheldon Chopra MD

## 2017-07-15 NOTE — PROGRESS NOTES
TRANSFER - OUT REPORT:    Verbal report given to Tacos Nguyen RN (name) on Jim Abarca  being transferred to CCU room  (unit) for routine progression of care       Report consisted of patients Situation, Background, Assessment and   Recommendations(SBAR). Information from the following report(s) Procedure Summary, Intake/Output, MAR, Recent Results and Cardiac Rhythm NSR was reviewed with the receiving nurse. Lines:   Quad Lumen 07/14/17 Right Femoral (Active)   Central Line Being Utilized Yes 7/15/2017  7:30 AM   Criteria for Appropriate Use Limited/no vessel suitable for conventional peripheral access 7/15/2017  7:30 AM   Site Assessment Clean, dry, & intact 7/15/2017  7:30 AM   Infiltration Assessment 0 7/15/2017  7:30 AM   Affected Extremity/Extremities Color distal to insertion site pink (or appropriate for race); Pulses palpable;Range of motion performed 7/15/2017  7:30 AM   Date of Last Dressing Change 07/17/17 7/15/2017  7:30 AM   Dressing Status Clean, dry, & intact 7/15/2017  7:30 AM   Dressing Type Disk with Chlorhexadine gluconate (CHG); Transparent 7/15/2017  7:30 AM   Action Taken Open ports on tubing capped 7/15/2017  4:00 AM   Proximal Hub Color/Line Status White; Infusing 7/15/2017  7:30 AM   Positive Blood Return (Medial Site) Yes 7/15/2017  7:30 AM   Medial 1 Hub Color/Line Status Gray;Capped;Flushed 7/15/2017  7:30 AM   Positive Blood Return (Lateral Site) Yes 7/15/2017  4:00 AM   Medial 2 Hub Color/Line Status Blue;Flushed;Capped 7/15/2017  7:30 AM   Positive Blood Return (Site #3) Yes 7/15/2017  4:00 AM   Distal Hub Color/Line Status Brown;Flushed;Capped 7/15/2017  7:30 AM   Positive Blood Return (Site #4) Yes 7/15/2017  4:00 AM   Alcohol Cap Used Yes 7/15/2017  7:30 AM       Peripheral IV 07/14/17 Left Hand (Active)   Site Assessment Clean, dry, & intact 7/15/2017  7:30 AM   Phlebitis Assessment 0 7/15/2017  7:30 AM   Infiltration Assessment 0 7/15/2017  7:30 AM   Dressing Status Clean, dry, & intact 7/15/2017  7:30 AM   Dressing Type Transparent;Tape 7/15/2017  7:30 AM   Hub Color/Line Status Pink;Capped 7/15/2017  7:30 AM   Action Taken Open ports on tubing capped 7/15/2017  4:00 AM   Alcohol Cap Used Yes 7/15/2017  7:30 AM        Opportunity for questions and clarification was provided.       Patient transported with:   O2 @ 2 liters  - pt remains on 2L NC same as preop

## 2017-07-16 ENCOUNTER — APPOINTMENT (OUTPATIENT)
Dept: GENERAL RADIOLOGY | Age: 77
DRG: 393 | End: 2017-07-16
Attending: INTERNAL MEDICINE
Payer: MEDICARE

## 2017-07-16 LAB
ALBUMIN SERPL BCP-MCNC: 1.9 G/DL (ref 3.5–5)
ALBUMIN/GLOB SERPL: 0.9 {RATIO} (ref 1.1–2.2)
ALP SERPL-CCNC: 30 U/L (ref 45–117)
ALT SERPL-CCNC: 12 U/L (ref 12–78)
ANION GAP BLD CALC-SCNC: 6 MMOL/L (ref 5–15)
AST SERPL W P-5'-P-CCNC: 12 U/L (ref 15–37)
BASOPHILS # BLD AUTO: 0.1 K/UL (ref 0–0.1)
BASOPHILS # BLD: 1 % (ref 0–1)
BILIRUB SERPL-MCNC: 0.7 MG/DL (ref 0.2–1)
BUN SERPL-MCNC: 5 MG/DL (ref 6–20)
BUN/CREAT SERPL: 8 (ref 12–20)
CALCIUM SERPL-MCNC: 6.6 MG/DL (ref 8.5–10.1)
CHLORIDE SERPL-SCNC: 108 MMOL/L (ref 97–108)
CO2 SERPL-SCNC: 24 MMOL/L (ref 21–32)
CREAT SERPL-MCNC: 0.62 MG/DL (ref 0.7–1.3)
EOSINOPHIL # BLD: 0.2 K/UL (ref 0–0.4)
EOSINOPHIL NFR BLD: 3 % (ref 0–7)
ERYTHROCYTE [DISTWIDTH] IN BLOOD BY AUTOMATED COUNT: 16.8 % (ref 11.5–14.5)
GLOBULIN SER CALC-MCNC: 2.1 G/DL (ref 2–4)
GLUCOSE SERPL-MCNC: 80 MG/DL (ref 65–100)
HCT VFR BLD AUTO: 21.8 % (ref 36.6–50.3)
HGB BLD-MCNC: 7.7 G/DL (ref 12.1–17)
HGB BLD-MCNC: 8 G/DL (ref 12.1–17)
INR PPP: 1.4 (ref 0.9–1.1)
LYMPHOCYTES # BLD AUTO: 23 % (ref 12–49)
LYMPHOCYTES # BLD: 1.6 K/UL (ref 0.8–3.5)
MAGNESIUM SERPL-MCNC: 2 MG/DL (ref 1.6–2.4)
MCH RBC QN AUTO: 31.3 PG (ref 26–34)
MCHC RBC AUTO-ENTMCNC: 35.3 G/DL (ref 30–36.5)
MCV RBC AUTO: 88.6 FL (ref 80–99)
MONOCYTES # BLD: 0.5 K/UL (ref 0–1)
MONOCYTES NFR BLD AUTO: 7 % (ref 5–13)
NEUTS SEG # BLD: 4.5 K/UL (ref 1.8–8)
NEUTS SEG NFR BLD AUTO: 66 % (ref 32–75)
PLATELET # BLD AUTO: 200 K/UL (ref 150–400)
POTASSIUM SERPL-SCNC: 3 MMOL/L (ref 3.5–5.1)
PROT SERPL-MCNC: 4 G/DL (ref 6.4–8.2)
PROTHROMBIN TIME: 14 SEC (ref 9–11.1)
RBC # BLD AUTO: 2.46 M/UL (ref 4.1–5.7)
SODIUM SERPL-SCNC: 138 MMOL/L (ref 136–145)
WBC # BLD AUTO: 6.8 K/UL (ref 4.1–11.1)

## 2017-07-16 PROCEDURE — 71010 XR CHEST PORT: CPT

## 2017-07-16 PROCEDURE — 85610 PROTHROMBIN TIME: CPT

## 2017-07-16 PROCEDURE — 80053 COMPREHEN METABOLIC PANEL: CPT

## 2017-07-16 PROCEDURE — 74011250637 HC RX REV CODE- 250/637: Performed by: FAMILY MEDICINE

## 2017-07-16 PROCEDURE — 74011250637 HC RX REV CODE- 250/637: Performed by: INTERNAL MEDICINE

## 2017-07-16 PROCEDURE — 94640 AIRWAY INHALATION TREATMENT: CPT

## 2017-07-16 PROCEDURE — 65270000029 HC RM PRIVATE

## 2017-07-16 PROCEDURE — 36415 COLL VENOUS BLD VENIPUNCTURE: CPT

## 2017-07-16 PROCEDURE — 3331090001 HH PPS REVENUE CREDIT

## 2017-07-16 PROCEDURE — 83735 ASSAY OF MAGNESIUM: CPT

## 2017-07-16 PROCEDURE — 74011000250 HC RX REV CODE- 250: Performed by: SPECIALIST

## 2017-07-16 PROCEDURE — 3331090002 HH PPS REVENUE DEBIT

## 2017-07-16 PROCEDURE — P9016 RBC LEUKOCYTES REDUCED: HCPCS | Performed by: EMERGENCY MEDICINE

## 2017-07-16 PROCEDURE — 77010033678 HC OXYGEN DAILY

## 2017-07-16 PROCEDURE — 74011000258 HC RX REV CODE- 258: Performed by: INTERNAL MEDICINE

## 2017-07-16 PROCEDURE — 74011250637 HC RX REV CODE- 250/637: Performed by: SPECIALIST

## 2017-07-16 PROCEDURE — 85025 COMPLETE CBC W/AUTO DIFF WBC: CPT

## 2017-07-16 PROCEDURE — 74011250636 HC RX REV CODE- 250/636: Performed by: INTERNAL MEDICINE

## 2017-07-16 PROCEDURE — 74011250636 HC RX REV CODE- 250/636: Performed by: FAMILY MEDICINE

## 2017-07-16 PROCEDURE — 36430 TRANSFUSION BLD/BLD COMPNT: CPT

## 2017-07-16 PROCEDURE — 85018 HEMOGLOBIN: CPT

## 2017-07-16 RX ORDER — POTASSIUM CHLORIDE 750 MG/1
40 TABLET, FILM COATED, EXTENDED RELEASE ORAL
Status: COMPLETED | OUTPATIENT
Start: 2017-07-16 | End: 2017-07-16

## 2017-07-16 RX ORDER — POLYETHYLENE GLYCOL 3350 17 G/17G
17 POWDER, FOR SOLUTION ORAL 2 TIMES DAILY
Status: DISCONTINUED | OUTPATIENT
Start: 2017-07-16 | End: 2017-07-19 | Stop reason: HOSPADM

## 2017-07-16 RX ADMIN — GUAIFENESIN AND DEXTROMETHORPHAN 10 ML: 100; 10 SYRUP ORAL at 21:43

## 2017-07-16 RX ADMIN — SODIUM CHLORIDE AND POTASSIUM CHLORIDE: 9; 1.49 INJECTION, SOLUTION INTRAVENOUS at 04:33

## 2017-07-16 RX ADMIN — BENZONATATE 100 MG: 100 CAPSULE ORAL at 15:36

## 2017-07-16 RX ADMIN — GUAIFENESIN AND DEXTROMETHORPHAN 10 ML: 100; 10 SYRUP ORAL at 13:33

## 2017-07-16 RX ADMIN — LORAZEPAM 1 MG: 1 TABLET ORAL at 15:33

## 2017-07-16 RX ADMIN — Medication 10 ML: at 15:38

## 2017-07-16 RX ADMIN — TAMSULOSIN HYDROCHLORIDE 0.4 MG: 0.4 CAPSULE ORAL at 09:34

## 2017-07-16 RX ADMIN — FINASTERIDE 5 MG: 5 TABLET, FILM COATED ORAL at 09:00

## 2017-07-16 RX ADMIN — POLYETHYLENE GLYCOL 3350 17 G: 17 POWDER, FOR SOLUTION ORAL at 11:25

## 2017-07-16 RX ADMIN — POTASSIUM CHLORIDE 40 MEQ: 750 TABLET, FILM COATED, EXTENDED RELEASE ORAL at 09:34

## 2017-07-16 RX ADMIN — IPRATROPIUM BROMIDE AND ALBUTEROL SULFATE 3 ML: .5; 3 SOLUTION RESPIRATORY (INHALATION) at 19:36

## 2017-07-16 RX ADMIN — PANTOPRAZOLE SODIUM 40 MG: 40 TABLET, DELAYED RELEASE ORAL at 09:34

## 2017-07-16 RX ADMIN — AMIODARONE HYDROCHLORIDE 200 MG: 200 TABLET ORAL at 09:34

## 2017-07-16 RX ADMIN — Medication 10 ML: at 00:23

## 2017-07-16 RX ADMIN — Medication 10 ML: at 15:40

## 2017-07-16 RX ADMIN — MUPIROCIN: 20 OINTMENT TOPICAL at 09:42

## 2017-07-16 RX ADMIN — IPRATROPIUM BROMIDE AND ALBUTEROL SULFATE 3 ML: .5; 3 SOLUTION RESPIRATORY (INHALATION) at 14:16

## 2017-07-16 RX ADMIN — AMIODARONE HYDROCHLORIDE 200 MG: 200 TABLET ORAL at 21:27

## 2017-07-16 RX ADMIN — IPRATROPIUM BROMIDE AND ALBUTEROL SULFATE 3 ML: .5; 3 SOLUTION RESPIRATORY (INHALATION) at 07:43

## 2017-07-16 RX ADMIN — CALCIUM GLUCONATE 2 G: 94 INJECTION, SOLUTION INTRAVENOUS at 09:36

## 2017-07-16 RX ADMIN — Medication 10 ML: at 08:38

## 2017-07-16 RX ADMIN — Medication 10 ML: at 21:33

## 2017-07-16 RX ADMIN — LORAZEPAM 1 MG: 1 TABLET ORAL at 21:27

## 2017-07-16 NOTE — PROGRESS NOTES
0700:  Bedside handoff report received from Amy Kahn RN (offgoing nurse). 5435:  IVF stopped per order. 1215:  New IV placed; s/w Dr. Charlene Shultz; ok to remove patient's femoral line. 1330:  Pt with 1 clear loose bowel movement. Pt also noted to have large amount of bleeding from femoral line site, believes it started breathing when he coughed. Patient cleaned up; new dressing applied. Pt maintained on bed rest, encouraged to splint area when coughing. Robitussin given. 1620:  TRANSFER - OUT REPORT:    Verbal report given to JOSE EDUARDO Kruger on Kerry Tinajero  being transferred to 217(unit) for routine progression of care       Report consisted of patients Situation, Background, Assessment and   Recommendations(SBAR). Information from the following report(s) SBAR, Kardex, ED Summary, Procedure Summary, Intake/Output and Recent Results was reveiwed with the receiving nurse. Opportunity for questions and clarification was provided.               Clara Manley RN

## 2017-07-16 NOTE — PROGRESS NOTES
0030 One unit of PRBC started. VSS. Will continue to monitor. 0200 Transfusion complete. No adverse reaction noted. VSS. Will continue to monitor.

## 2017-07-16 NOTE — PROGRESS NOTES
Hospitalist Progress Note    NAME: Kerry Tinajero   :  1940   MRN:  [id number]       Assessment / Plan:  GI Bleeding: Diverticular Bleed ruled out; 7/15 Colonoscopy showed Ischemic Colitis and Colonic Ulceration  -continue ICU monitoring until Hgb deemed to be stable and bleeding clinically resolved  -continue clear liquid diet    Acute Blood Loss Anemia  -s/p 5 units of blood (received last unit last pm for Hgb of 7.6 and Hgb unchanged this am at 7.7), recheck Hgb at [facility]     Incidental note of Right hip Effusion on CT A/P from   -will wait until GIB is no longer an issues and consult Orthopedic Surgery    Atrial Fibrillation  -hold ASA, PO Amiodarone restarted    Severe Hypokalemia: improving  -additional potassium repletion needed today    Mild Hypocalcemia: calcium corrects to 8.3  -will give additional IV calcium     Hypertension  -hold home Metoprolol at this time as BP trends are ideal (Systolic BP around 036 mmHg)    Discharged on  with PNA and respiratory failure (coarse breath sound noted at this time per review of discharge summary physical exam), no TTE available in out system  -stop IVF's for now  -check CXR  -continue O2    Severe Protein Calorie Malnutrition: albunim 1.9  -will get nutrition consult once diet further advanced    Body mass index is 26.72 kg/(m^2).     Code status: Full  Prophylaxis: SCD's  Recommended Disposition: TBD     Subjective:     Chief Complaint / Reason for Physician Visit: follow-up GIB/Anemia  S/p Colonoscopy yesterday with ischemic colitis and colonic ulcer  Patient reports BM last pm and states that it was brown  Received 1 unit pRBC's last last pm for Hgb of 7.6 and Hgb unchanges this am at 7.7    Review of Systems:  Symptom Y/N Comments  Symptom Y/N Comments   Fever/Chills n   Chest Pain n    Poor Appetite    Edema     Cough y States same as it has been  Abdominal Pain n    Sputum    Joint Pain     SOB/ANDERSON n denies  Pruritis/Rash     Nausea/vomit Tolerating PT/OT     Diarrhea    Tolerating Diet     Constipation    Other       Could NOT obtain due to:      Objective:     VITALS:   Last 24hrs VS reviewed since prior progress note.  Most recent are:  Patient Vitals for the past 24 hrs:   Temp Pulse Resp BP SpO2   07/16/17 0400 98 °F (36.7 °C) 69 18 126/49 100 %   07/16/17 0300 - 69 17 134/48 100 %   07/16/17 0200 98.2 °F (36.8 °C) 71 17 136/56 100 %   07/16/17 0130 97.6 °F (36.4 °C) 72 18 123/43 100 %   07/16/17 0100 97.8 °F (36.6 °C) 71 18 135/49 100 %   07/16/17 0045 97.2 °F (36.2 °C) 73 21 132/48 100 %   07/16/17 0030 98.4 °F (36.9 °C) 72 18 121/64 100 %   07/16/17 0000 98.6 °F (37 °C) 75 17 122/46 100 %   07/15/17 2300 - 81 29 131/48 100 %   07/15/17 2200 - 77 20 126/45 100 %   07/15/17 2100 - 88 24 139/50 100 %   07/15/17 2000 97.6 °F (36.4 °C) 83 22 133/53 100 %   07/15/17 1954 - - - - 100 %   07/15/17 1900 - 88 29 121/50 100 %   07/15/17 1800 - 92 23 130/57 100 %   07/15/17 1700 - 94 18 160/59 100 %   07/15/17 1630 98.1 °F (36.7 °C) 94 20 149/54 100 %   07/15/17 1628 - 99 25 153/70 99 %   07/15/17 1624 - 92 20 157/67 100 %   07/15/17 1620 - 94 22 148/68 100 %   07/15/17 1618 - 91 20 149/67 100 %   07/15/17 1614 - 94 22 146/66 99 %   07/15/17 1612 - 94 21 149/70 98 %   07/15/17 1610 - 95 24 158/61 100 %   07/15/17 1608 - 95 23 144/70 100 %   07/15/17 1606 - 95 21 144/65 100 %   07/15/17 1604 - 95 21 148/88 100 %   07/15/17 1602 - 95 23 140/82 100 %   07/15/17 1600 - 94 27 157/73 99 %   07/15/17 1558 - 94 21 141/53 100 %   07/15/17 1556 - 96 22 143/58 100 %   07/15/17 1554 - 93 25 136/59 100 %   07/15/17 1552 - 91 23 127/48 100 %   07/15/17 1550 - 88 25 114/44 100 %   07/15/17 1548 - 88 17 106/53 100 %   07/15/17 1546 - 89 30 106/53 100 %   07/15/17 1544 - 92 21 129/45 100 %   07/15/17 1542 - 100 21 140/56 98 %   07/15/17 1538 - 94 26 147/45 100 %   07/15/17 1536 - 95 28 148/53 100 %   07/15/17 1534 - 99 20 143/55 100 %   07/15/17 1530 - 100 30 146/48 91 % 07/15/17 1525 - 96 24 164/62 100 %   07/15/17 1500 - 91 25 153/62 100 %   07/15/17 1450 - 94 20 164/59 100 %   07/15/17 1404 - - - - 100 %   07/15/17 1400 - 80 25 155/46 100 %   07/15/17 1330 - 82 24 161/65 100 %   07/15/17 1300 - 77 20 151/54 100 %   07/15/17 1230 - 76 19 152/54 100 %   07/15/17 1200 - 72 22 157/52 100 %   07/15/17 1130 - 78 24 145/50 99 %   07/15/17 1100 98.4 °F (36.9 °C) 84 29 157/56 100 %   07/15/17 1030 - 82 20 166/67 98 %   07/15/17 1000 - 75 22 148/56 97 %   07/15/17 0930 - 75 21 144/52 98 %   07/15/17 0900 - 79 23 133/58 100 %   07/15/17 0800 - 76 19 143/55 100 %   07/15/17 0730 98 °F (36.7 °C) 78 19 153/59 100 %       Intake/Output Summary (Last 24 hours) at 07/16/17 0713  Last data filed at 07/16/17 0200   Gross per 24 hour   Intake          2118.33 ml   Output                0 ml   Net          2118.33 ml        PHYSICAL EXAM:  General: WD, WN. Alert, cooperative, no acute distress    EENT:  EOMI. Anicteric sclerae. MM dry  Resp:  Coarse breath sounds at bilateral bases, no wheezing or rhonchi. No accessory muscle use  CV:  Regular  rhythm,  Trace BLE edema  GI:  Soft, Non distended, Non tender. Hypoactive Bowel sounds  Neurologic:  Alert and oriented X 3, normal speech,   Psych:   Good insight. Not anxious nor agitated  Skin:  No rashes. No jaundice    Reviewed most current lab test results and cultures  YES  Reviewed most current radiology test results   YES  Review and summation of old records today    NO  Reviewed patient's current orders and MAR    YES  PMH/SH reviewed - no change compared to H&P  ________________________________________________________________________  Care Plan discussed with:    Comments   Patient x    Family      RN  RN was on phone discussing emergent situation for another with physician   Care Manager     Consultant                        Multidiciplinary team rounds were held today with , nursing, pharmacist and clinical coordinator.   Patient's plan of care was discussed; medications were reviewed and discharge planning was addressed. ________________________________________________________________________  Total NON critical care TIME:  30   Minutes    Total CRITICAL CARE TIME Spent:   Minutes non procedure based      Comments   >50% of visit spent in counseling and coordination of care     ________________________________________________________________________  Sarahi Pollard MD     Procedures: see electronic medical records for all procedures/Xrays and details which were not copied into this note but were reviewed prior to creation of Plan. LABS:  I reviewed today's most current labs and imaging studies. Pertinent labs include:  Recent Labs      07/16/17   0338  07/15/17   1841  07/15/17   1053   07/14/17   0445   WBC  6.8   --    --    --   9.7   HGB  7.7*  7.6*  8.2*   < >  6.0*   HCT  21.8*  21.0*  23.1*   < >  16.9*   PLT  200   --    --    --   330    < > = values in this interval not displayed. Recent Labs      07/16/17   0338  07/15/17   1226  07/15/17   0423   07/14/17   0445   NA  138  141  140   < >  139   K  3.0*  3.3*  2.9*   < >  2.5*   CL  108  110*  107   < >  105   CO2  24  23  23   < >  24   GLU  80  92  87   < >  159*   BUN  5*  8  9   < >  13   CREA  0.62*  0.70  0.73   < >  0.99   CA  6.6*  6.8*  6.5*   < >  6.7*   MG  2.0  2.8*   --    --    --    ALB  1.9*   --    --    --   1.9*   TBILI  0.7   --    --    --   0.4   SGOT  12*   --    --    --   7*   ALT  12   --    --    --   10*    < > = values in this interval not displayed.        Signed: Sarahi Pollard MD

## 2017-07-16 NOTE — PROGRESS NOTES
GI Progress Note Abiel Fuentes for Sam Zbigniew)  NAME:Dioni David DXQ:[id number]   ATTG: Dr. Pham Silva  PCP: Dhruv Perez MD  Date/Time:  7/16/2017 9:57 AM   Assessment:   · BRBPR, secondary to colonic ulcers in splenic flexure, likely secondary to ischemia  · Recent volvulus s/p decompression, likely a factor in ischemia     Plan:   · Follow serial H/H  · Follow GI output for signs of ongoing bleeding  · If rapid bleeding, would favor IR targeting mesenteric vasculature toward hemoclip/splenic flexure     Subjective:   Family at bedside. NO further bleeding per rectum. Did require PRBCs x 1, but also active bleeding off and on for 24+ hours prior to colonoscopy yesterday. X-rays reviewed. Colonic distention continues to be impressive on imaging. Clip in LUQ, likely descending side of splenic flexure. Review of Systems:  Symptom Y/N Comments  Symptom Y/N Comments   Fever/Chills n   Chest Pain n    Cough n   Headaches n    Sputum n   Joint Pain n    SOB/ANDERSON n   Pruritis/Rash n    Tolerating Diet y   Other       Could NOT obtain due to:      Objective:   VITALS:   Last 24hrs VS reviewed since prior progress note. Most recent are:  Visit Vitals    /56 (BP 1 Location: Right arm, BP Patient Position: Supine)    Pulse 81    Temp 98.4 °F (36.9 °C)    Resp 20    Wt 89.4 kg (197 lb)    SpO2 100%    BMI 26.72 kg/m2       Intake/Output Summary (Last 24 hours) at 07/16/17 0957  Last data filed at 07/16/17 0200   Gross per 24 hour   Intake          1648.33 ml   Output                0 ml   Net          1648.33 ml     PHYSICAL EXAM:  General: WD, WN. Alert, cooperative, no acute distress    HEENT: NC, Atraumatic. Anicteric sclerae. Lungs:  CTA Bilaterally. No Wheezing/Rhonchi/Rales. Heart:  Regular  rhythm,  No murmur (), No Rubs, No Gallops  Abdomen: Soft, Non distended, Non tender.  +Bowel sounds, no HSM  Extremities: 1-2+ LE edema  Neurologic:  Alert and oriented X 3.   No acute neurological distress   Psych:   Fair insight. Not anxious nor agitated. Lab and Radiology Data Reviewed: (see below)    Medications Reviewed: (see below)  PMH/SH reviewed - no change compared to H&P  ________________________________________________________________________  Total time spent with patient: 15 minutes ________________________________________________________________________  Care Plan discussed with:  Patient y   Family  y   RN y              Consultant:       Dionicio Khan MD     Procedures: see electronic medical records for all procedures/Xrays and details which were not copied into this note but were reviewed prior to creation of Plan. LABS:  Recent Labs      07/16/17   0338  07/15/17   1841   07/14/17   0445   WBC  6.8   --    --   9.7   HGB  7.7*  7.6*   < >  6.0*   HCT  21.8*  21.0*   < >  16.9*   PLT  200   --    --   330    < > = values in this interval not displayed. Recent Labs      07/16/17   0338  07/15/17   1226  07/15/17   0423   NA  138  141  140   K  3.0*  3.3*  2.9*   CL  108  110*  107   CO2  24  23  23   BUN  5*  8  9   CREA  0.62*  0.70  0.73   GLU  80  92  87   CA  6.6*  6.8*  6.5*   MG  2.0  2.8*   --      Recent Labs      07/16/17 0338 07/14/17   0445   SGOT  12*  7*   AP  30*  30*   TP  4.0*  4.2*   ALB  1.9*  1.9*   GLOB  2.1  2.3     No results for input(s): INR, PTP, APTT in the last 72 hours. No lab exists for component: INREXT   No results for input(s): FE, TIBC, PSAT, FERR in the last 72 hours. No results found for: FOL, RBCF  No results for input(s): PH, PCO2, PO2 in the last 72 hours. No results for input(s): CPK, CKMB in the last 72 hours.     No lab exists for component: TROPONINI  Lab Results   Component Value Date/Time    Color YELLOW/STRAW 07/02/2017 04:38 AM    Appearance CLEAR 07/02/2017 04:38 AM    Specific gravity 1.020 07/02/2017 04:38 AM    Specific gravity 1.022 06/30/2017 10:32 PM    pH (UA) 6.0 07/02/2017 04:38 AM    Protein 100 07/02/2017 04:38 AM Glucose NEGATIVE  07/02/2017 04:38 AM    Ketone TRACE 07/02/2017 04:38 AM    Bilirubin NEGATIVE  11/05/2016 01:30 PM    Urobilinogen 1.0 07/02/2017 04:38 AM    Nitrites NEGATIVE  07/02/2017 04:38 AM    Leukocyte Esterase NEGATIVE  07/02/2017 04:38 AM    Epithelial cells FEW 07/02/2017 04:38 AM    Bacteria NEGATIVE  07/02/2017 04:38 AM    WBC 0-4 07/02/2017 04:38 AM    RBC 10-20 07/02/2017 04:38 AM       MEDICATIONS:  Current Facility-Administered Medications   Medication Dose Route Frequency    calcium gluconate 2 g in 0.9% sodium chloride 100 mL IVPB  2 g IntraVENous ONCE    amiodarone (CORDARONE) tablet 200 mg  200 mg Oral Q12H    benzonatate (TESSALON) capsule 100 mg  100 mg Oral Q8H PRN    finasteride (PROSCAR) tablet 5 mg  5 mg Oral DAILY    guaiFENesin-dextromethorphan (ROBITUSSIN DM) 100-10 mg/5 mL syrup 10 mL  10 mL Oral Q6H PRN    tamsulosin (FLOMAX) capsule 0.4 mg  0.4 mg Oral DAILY    simethicone (MYLICON) 62WT/5.4DL oral drops 80 mg  1.2 mL Oral Multiple    fentaNYL citrate (PF) injection  mcg   mcg IntraVENous Multiple    midazolam (VERSED) injection 1-10 mg  1-10 mg IntraVENous Multiple    pantoprazole (PROTONIX) tablet 40 mg  40 mg Oral ACB    0.9% sodium chloride infusion 250 mL  250 mL IntraVENous PRN    simethicone (MYLICON) 40HF/3.7WJ oral drops 80 mg  1.2 mL Oral Multiple    sodium chloride (NS) flush 5-10 mL  5-10 mL IntraVENous Q8H    sodium chloride (NS) flush 5-10 mL  5-10 mL IntraVENous PRN    ondansetron (ZOFRAN) injection 4 mg  4 mg IntraVENous Q4H PRN    albuterol-ipratropium (DUO-NEB) 2.5 MG-0.5 MG/3 ML  3 mL Nebulization Q6HWA RT    mupirocin (BACTROBAN) 2 % ointment   Both Nostrils BID    LORazepam (ATIVAN) tablet 1 mg  1 mg Oral Q6H PRN

## 2017-07-16 NOTE — PROGRESS NOTES
7/16/2017  10:16 AM    INTENSIVIST PROGRESS NOTE:     Patient seen and evaluated   79 yo male presented with 2 days hx of rectal bleeding, BRBPR  colonscopy notes noted with areas of ischemia    Visit Vitals    BP (!) 163/100 (BP 1 Location: Right arm, BP Patient Position: Supine)    Pulse 90    Temp 97.7 °F (36.5 °C)    Resp 29    Wt 89.4 kg (197 lb)    SpO2 100%    BMI 26.72 kg/m2       General: no distress  Heent:  Nc/at eomi  CV: RRR  Lungs: clear  abd  S/nt/nd  Ext:  No c/c/e  Neuro:  Cn 2-12 intact  Skin:  No rashes    CXR: bibasilar atx    Labs reviewed    A/P:  -GI bleed  -recent sigmoid volvulus  -anemia    NPO  protonix po  Electrolyte repletion  Transfuse as needed  Serial labs  Home meds  Transfer when hgb stable for 24 hours  Will assist on disposition planning when stable for transfer  Dario Be MD

## 2017-07-16 NOTE — PROGRESS NOTES
End of Shift Nursing Note    Bedside shift change report given to Vita Lucio (oncoming nurse) by Ngozi Yousif (offgoing nurse). Report included the following information SBAR, Kardex, ED Summary, Intake/Output, MAR, Accordion, Recent Results and Med Rec Status. Zone Phone:       Significant changes during shift:    CCU transfer. Pt up in chair; persistent cough-doing well otherwise. Non-emergent issues for physician to address:        Number times ambulated in hallway past shift: 0      Number of times OOB to chair past shift: 1    POD #:      Vital Signs:    Temp: 98.2 °F (36.8 °C)     Pulse (Heart Rate): (!) 102     BP: 108/63     Resp Rate: 18     O2 Sat (%): 97 %    Lines & Drains:     Urinary Catheter? No   Placement Date:    Medical Necessity:   Central Line? No   Placement Date:    Medical Necessity:   PICC Line? No   Placement Date:    Medical Necessity:     NG tube [] in [] removed [x] not applicable   Drains [] in [] removed [x] not applicable     Skin Integrity:      Wounds: yes   Dressings Present: yes    Wound Concerns: no      GI:    Current diet:  DIET CLEAR LIQUID    Nausea: NO  Vomiting: NO  Bowel Sounds: YES  Flatus: YES  Last Bowel Movement: today   Appearance:     Respiratory:  Supplemental O2: Yes      Device: nc   via 2 Liters/min     Incentive Spirometer: YES  Volume:   Coughing and Deep Breathing: YES  Oral Care: YES  Understanding (patient/family education): YES   Getting out of bed: YES  Head of bed elevation: YES    Patient Safety:    Falls Score: 3  Mobility Score: 3  Bed Alarm On? No  Sitter? No      Opportunity for questions and clarification was given to oncoming nurse. Patient bed is in low position, side rails are up x 2, door & observation blinds open as needed, call bell within reach and patient not in distress.     Lluvia Boyer

## 2017-07-17 LAB
ABO + RH BLD: NORMAL
ANION GAP BLD CALC-SCNC: 9 MMOL/L (ref 5–15)
BLD PROD TYP BPU: NORMAL
BLOOD GROUP ANTIBODIES SERPL: NORMAL
BPU ID: NORMAL
BUN SERPL-MCNC: 4 MG/DL (ref 6–20)
BUN/CREAT SERPL: 5 (ref 12–20)
CALCIUM SERPL-MCNC: 7.3 MG/DL (ref 8.5–10.1)
CHLORIDE SERPL-SCNC: 105 MMOL/L (ref 97–108)
CO2 SERPL-SCNC: 22 MMOL/L (ref 21–32)
CREAT SERPL-MCNC: 0.78 MG/DL (ref 0.7–1.3)
CROSSMATCH RESULT,%XM: NORMAL
ERYTHROCYTE [DISTWIDTH] IN BLOOD BY AUTOMATED COUNT: 17.5 % (ref 11.5–14.5)
GLUCOSE SERPL-MCNC: 92 MG/DL (ref 65–100)
HCT VFR BLD AUTO: 23.4 % (ref 36.6–50.3)
HGB BLD-MCNC: 8.1 G/DL (ref 12.1–17)
MCH RBC QN AUTO: 30.7 PG (ref 26–34)
MCHC RBC AUTO-ENTMCNC: 34.6 G/DL (ref 30–36.5)
MCV RBC AUTO: 88.6 FL (ref 80–99)
PLATELET # BLD AUTO: 254 K/UL (ref 150–400)
POTASSIUM SERPL-SCNC: 2.9 MMOL/L (ref 3.5–5.1)
RBC # BLD AUTO: 2.64 M/UL (ref 4.1–5.7)
SODIUM SERPL-SCNC: 136 MMOL/L (ref 136–145)
SPECIMEN EXP DATE BLD: NORMAL
STATUS OF UNIT,%ST: NORMAL
UNIT DIVISION, %UDIV: 0
WBC # BLD AUTO: 8.1 K/UL (ref 4.1–11.1)

## 2017-07-17 PROCEDURE — 94640 AIRWAY INHALATION TREATMENT: CPT

## 2017-07-17 PROCEDURE — 3331090001 HH PPS REVENUE CREDIT

## 2017-07-17 PROCEDURE — 80048 BASIC METABOLIC PNL TOTAL CA: CPT

## 2017-07-17 PROCEDURE — 77030027138 HC INCENT SPIROMETER -A

## 2017-07-17 PROCEDURE — 3331090002 HH PPS REVENUE DEBIT

## 2017-07-17 PROCEDURE — 36415 COLL VENOUS BLD VENIPUNCTURE: CPT

## 2017-07-17 PROCEDURE — 74011250637 HC RX REV CODE- 250/637: Performed by: FAMILY MEDICINE

## 2017-07-17 PROCEDURE — 65270000029 HC RM PRIVATE

## 2017-07-17 PROCEDURE — 74011000250 HC RX REV CODE- 250: Performed by: SPECIALIST

## 2017-07-17 PROCEDURE — 74011250637 HC RX REV CODE- 250/637: Performed by: INTERNAL MEDICINE

## 2017-07-17 PROCEDURE — 74011250637 HC RX REV CODE- 250/637: Performed by: SPECIALIST

## 2017-07-17 PROCEDURE — 85027 COMPLETE CBC AUTOMATED: CPT

## 2017-07-17 RX ORDER — POTASSIUM CHLORIDE 750 MG/1
40 TABLET, FILM COATED, EXTENDED RELEASE ORAL
Status: COMPLETED | OUTPATIENT
Start: 2017-07-17 | End: 2017-07-17

## 2017-07-17 RX ADMIN — LORAZEPAM 1 MG: 1 TABLET ORAL at 04:03

## 2017-07-17 RX ADMIN — PANTOPRAZOLE SODIUM 40 MG: 40 TABLET, DELAYED RELEASE ORAL at 05:06

## 2017-07-17 RX ADMIN — IPRATROPIUM BROMIDE AND ALBUTEROL SULFATE 3 ML: .5; 3 SOLUTION RESPIRATORY (INHALATION) at 21:27

## 2017-07-17 RX ADMIN — POLYETHYLENE GLYCOL 3350 17 G: 17 POWDER, FOR SOLUTION ORAL at 09:15

## 2017-07-17 RX ADMIN — POTASSIUM CHLORIDE 40 MEQ: 750 TABLET, FILM COATED, EXTENDED RELEASE ORAL at 09:16

## 2017-07-17 RX ADMIN — IPRATROPIUM BROMIDE AND ALBUTEROL SULFATE 3 ML: .5; 3 SOLUTION RESPIRATORY (INHALATION) at 07:53

## 2017-07-17 RX ADMIN — LORAZEPAM 1 MG: 1 TABLET ORAL at 22:04

## 2017-07-17 RX ADMIN — IPRATROPIUM BROMIDE AND ALBUTEROL SULFATE 3 ML: .5; 3 SOLUTION RESPIRATORY (INHALATION) at 14:27

## 2017-07-17 RX ADMIN — AMIODARONE HYDROCHLORIDE 200 MG: 200 TABLET ORAL at 09:15

## 2017-07-17 RX ADMIN — AMIODARONE HYDROCHLORIDE 200 MG: 200 TABLET ORAL at 22:04

## 2017-07-17 RX ADMIN — Medication 10 ML: at 22:04

## 2017-07-17 RX ADMIN — TAMSULOSIN HYDROCHLORIDE 0.4 MG: 0.4 CAPSULE ORAL at 09:00

## 2017-07-17 RX ADMIN — Medication 10 ML: at 04:03

## 2017-07-17 RX ADMIN — POLYETHYLENE GLYCOL 3350 17 G: 17 POWDER, FOR SOLUTION ORAL at 16:47

## 2017-07-17 RX ADMIN — MUPIROCIN: 20 OINTMENT TOPICAL at 12:15

## 2017-07-17 RX ADMIN — GUAIFENESIN AND DEXTROMETHORPHAN 10 ML: 100; 10 SYRUP ORAL at 16:46

## 2017-07-17 RX ADMIN — MUPIROCIN: 20 OINTMENT TOPICAL at 16:47

## 2017-07-17 RX ADMIN — Medication 10 ML: at 16:46

## 2017-07-17 RX ADMIN — FINASTERIDE 5 MG: 5 TABLET, FILM COATED ORAL at 09:15

## 2017-07-17 NOTE — HOME CARE
Patient is open to [facility] services for SN & PT under the orders of Dr. Edward Henry. Please enter resumption of care orders as needed.     Sondra Cerna, RN, BSN  [facility] Liaison  [phone]

## 2017-07-17 NOTE — PROGRESS NOTES
Hospitalist Progress Note    NAME: Leland Downing   :  1940   MRN:  [id number]       Assessment / Plan:  GI Bleeding: Diverticular Bleed ruled out; 7/15 Colonoscopy showed Ischemic Colitis and Colonic Ulceration (had Volvulus last admission)  -Hgb stable, transferred out of ICU on  and Femoral CVC removed  -Case discussed with Dr. Leslie Gomez this morning  -diet advanced    Acute Blood Loss Anemia  -s/p 5 units of blood (received last unit last pm for Hgb of 7.6 and Hgb unchanged this am at 7.7), Hgb stable, continue to monitor daily unless indicated more frequently    Incidental note of Right hip Effusion on CT A/P from   -consult Orthopedic Surgery    Atrial Fibrillation  -hold ASA, PO Amiodarone restarted    Severe Hypokalemia: persists  -replete potassium today and monitor    Mild Hypocalcemia: improved after repletion    Hypertension  -hold home Metoprolol at this time as BP trends are ideal (Systolic BP around 772 mmHg)    Discharged on  with PNA and respiratory failure (coarse breath sound noted at this time per review of discharge summary physical exam), no TTE available in out system  -CXR on : \"Comparison to 2017. Portable exam obtained at 2361 demonstrates little change in the elevation of the right hemidiaphragm with right basilar  atelectasis compared to prior examination. IMPRESSION  Impression: No change compared to the prior exam.\"    Severe Protein Calorie Malnutrition: albunim 1.9  -nutrition consult     Body mass index is 26.72 kg/(m^2).     Code status: Full  Prophylaxis: SCD's  Recommended Disposition: TBD     Subjective:     Chief Complaint / Reason for Physician Visit: follow-up GIB/Anemia  Patient seen earlier today, about to eat breakfast  Patient feels better today, no pain or recurrent bleeding  Denies right hip pain    Review of Systems:  Symptom Y/N Comments  Symptom Y/N Comments   Fever/Chills n   Chest Pain n    Poor Appetite    Edema y \"same as always\"   Cough y unchanged  Abdominal Pain n    Sputum    Joint Pain     SOB/ANDERSON n   Pruritis/Rash     Nausea/vomit    Tolerating PT/OT     Diarrhea    Tolerating Diet     Constipation    Other       Could NOT obtain due to:      Objective:     VITALS:   Last 24hrs VS reviewed since prior progress note. Most recent are:  Patient Vitals for the past 24 hrs:   Temp Pulse Resp BP SpO2   07/17/17 1145 97.9 °F (36.6 °C) 80 18 145/71 98 %   07/17/17 0758 97.7 °F (36.5 °C) 85 16 154/69 97 %   07/17/17 0754 - - - - 97 %   07/17/17 0430 98 °F (36.7 °C) 97 14 145/71 100 %   07/16/17 2247 98 °F (36.7 °C) 96 16 144/68 99 %   07/16/17 2127 - 95 - 104/57 -   07/16/17 1935 - - - - 99 %   07/16/17 1917 97.7 °F (36.5 °C) 99 18 100/46 99 %   07/16/17 1709 98.2 °F (36.8 °C) (!) 102 18 108/63 97 %   07/16/17 1622 98.1 °F (36.7 °C) 86 21 139/49 100 %   07/16/17 1600 - 88 22 133/48 100 %   07/16/17 1500 - 88 24 131/49 100 %   07/16/17 1417 - - - - 99 %   07/16/17 1400 - 81 18 118/46 100 %   07/16/17 1300 - 86 26 151/61 100 %       Intake/Output Summary (Last 24 hours) at 07/17/17 1258  Last data filed at 07/17/17 0800   Gross per 24 hour   Intake              240 ml   Output                0 ml   Net              240 ml        PHYSICAL EXAM:  General: WD, WN. Alert, cooperative, no acute distress    EENT:  EOMI. Anicteric sclerae. MM dry  Resp:  Coarse breath sounds at bilateral bases, no wheezing or rhonchi. No accessory muscle use  CV:  Regular  rhythm,  Trace BLE edema  GI:  Soft, Non distended, Non tender. Hypoactive Bowel sounds  Neurologic:  Alert and oriented X 3, normal speech,   Psych:   Good insight. Not anxious nor agitated  Skin:  No rashes.   No jaundice    Reviewed most current lab test results and cultures  YES  Reviewed most current radiology test results   YES  Review and summation of old records today    NO  Reviewed patient's current orders and MAR    YES  PMH/SH reviewed - no change compared to H&P  ________________________________________________________________________  Care Plan discussed with:    Comments   Patient x    Family      RN     Care Manager x    Consultant  x Dr. Jesus Em team rounds were held today with , nursing, pharmacist and clinical coordinator. Patient's plan of care was discussed; medications were reviewed and discharge planning was addressed. ________________________________________________________________________  Total NON critical care TIME:  25   Minutes    Total CRITICAL CARE TIME Spent:   Minutes non procedure based      Comments   >50% of visit spent in counseling and coordination of care     ________________________________________________________________________  Zahira Chu MD     Procedures: see electronic medical records for all procedures/Xrays and details which were not copied into this note but were reviewed prior to creation of Plan. LABS:  I reviewed today's most current labs and imaging studies.   Pertinent labs include:  Recent Labs      07/17/17 0407 07/16/17   1130 07/16/17   0338  07/15/17   1841   WBC  8.1   --   6.8   --    HGB  8.1*  8.0*  7.7*  7.6*   HCT  23.4*   --   21.8*  21.0*   PLT  254   --   200   --      Recent Labs      07/17/17   0407  07/16/17   1130  07/16/17   0338  07/15/17   1226   NA  136   --   138  141   K  2.9*   --   3.0*  3.3*   CL  105   --   108  110*   CO2  22   --   24  23   GLU  92   --   80  92   BUN  4*   --   5*  8   CREA  0.78   --   0.62*  0.70   CA  7.3*   --   6.6*  6.8*   MG   --    --   2.0  2.8*   ALB   --    --   1.9*   --    TBILI   --    --   0.7   --    SGOT   --    --   12*   --    ALT   --    --   12   --    INR   --   1.4*   --    --        Signed: Zahira Chu MD

## 2017-07-17 NOTE — CONSULTS
Orthopedic CONSULT NOTE    Subjective:     Date of Consultation:  July 17, 2017      Anna Marie Turner is a 78 y.o. male who is being seen for right hip fluid collection . No Injury   Fluid collection present on last 2 ct scans done about 2 weeks apart  . Workup has revealed possible metal on metal reactive fluid collection. Patient's ambulatory status includes None  Patient known to DR Petros Posey had hip replacement 14 years ago. Has done well. Has no pain at rest or with ambulation  Afebrile  No trauma    Patient Active Problem List    Diagnosis Date Noted    GI bleed 07/14/2017    Sepsis due to pneumonia ([facility] 07/08/2017    E-coli UTI 07/08/2017    Sigmoid volvulus ([facility] 07/08/2017    [facility] 07/08/2017    Electrolyte abnormality 07/08/2017    Hypertension 07/08/2017    Res[facility] failure ([facility] 07/02/2017     History reviewed. No pertinent family history. Social History   Substance Use Topics    Smoking status: Never Smoker    Smokeless tobacco: Never Used    Alcohol use Yes      Comment: occationally     Past Medical History:   Diagnosis Date    Chronic kidney disease     Pacemaker 2013      Past Surgical History:   Procedure Laterality Date    COLONOSCOPY N/A 7/2/2017    COLONOSCOPY ER 21 performed by Isis Harris MD at [facility] ENDOSCOPY    COLONOSCOPY N/A 7/15/2017    COLONOSCOPY performed by Kika Larios MD at [facility] ENDOSCOPY    COLONOSCOPY,FLEX,W/CONTROL, BLEEDING  7/15/2017         HX ORTHOPAEDIC      hip replacement, knee, back      Prior to Admission medications    Medication Sig Start Date End Date Taking? Authorizing Provider   OXYGEN-AIR DELIVERY SYSTEMS 2 L by Nasal route as needed (sob). concentrator and portable tanks in the home    Historical Provider   bumetanide (BUMEX) 1 mg tablet Take 1 mg by mouth as needed (edema). Historical Provider   potassium chloride SR (K-TAB) 20 mEq tablet Take 20 mEq by mouth as needed (with bumex use daily).     Historical Provider   cephALEXin (KEFLEX) 500 mg capsule Take 500 mg by mouth three (3) times daily. when having oral procedure    Historical Provider   naproxen (NAPROSYN) 250 mg tablet Take 250 mg by mouth as needed. Historical Provider   acetaminophen (TYLENOL) 500 mg tablet Take 500 mg by mouth every six (6) hours as needed. Historical Provider   polyethylene glycol (MIRALAX) 17 gram packet Take 1 Packet by mouth daily. 7/8/17   Riccardo Bennett NP   guaiFENesin-dextromethorphan (ROBITUSSIN DM) 100-10 mg/5 mL syrup Take 10 mL by mouth every six (6) hours as needed for up to 10 days. 7/8/17 7/18/17  Riccardo Bennett NP   doxycycline (VIBRA-TABS) 100 mg tablet Take 1 Tab by mouth every twelve (12) hours. Patient not taking: Reported on 7/14/2017 7/8/17   Riccardo Bennett NP   l.acidoph-B.lactis-B.longum (FLORAJEN3) 460 mg (7.5-6- 1.5 bill. cell) cap cap Take 1 Cap by mouth Daily (before breakfast). 7/8/17   Riccardo Bennett NP   linaclotide (LINZESS) 145 mcg cap capsule Take 1 Cap by mouth Daily (before breakfast). Patient not taking: Reported on 7/9/2017 7/8/17   Riccardo Bennett NP   metoprolol succinate (TOPROL-XL) 50 mg XL tablet Take 0.5 Tabs by mouth two (2) times a day for 30 days. 7/7/17 8/6/17  Carla Miller MD   amiodarone (CORDARONE) 200 mg tablet Take 1 Tab by mouth two (2) times a day for 14 days. Then, starting on 7/23, decrease dose to 200 mg by mouth daily in the morning for 14 more days. (28 days total therapy) 7/8/17 7/22/17  Carla Miller MD   aspirin 81 mg chewable tablet Take 81 mg by mouth daily. Historical Provider   pregabalin (LYRICA) 100 mg capsule Take  by mouth two (2) times a day. Oseas Chandler MD   HYDROcodone-acetaminophen (NORCO) 5-325 mg per tablet Take 1 Tab by mouth every six (6) hours as needed for Pain. Max Daily Amount: 4 Tabs. 6/30/17   Yuni Griggs MD   omeprazole (PRILOSEC) 20 mg capsule Take 20 mg by mouth daily.       Oseas Chandler MD   LORazepam (ATIVAN) 1 mg tablet Take  by mouth every four (4) hours as needed. Oseas Chandler MD   tamsulosin (FLOMAX) 0.4 mg capsule Take 0.4 mg by mouth daily. Oseas Chandler MD   finasteride (PROSCAR) 5 mg tablet Take 5 mg by mouth daily. Oseas Chandler MD     Current Facility-Administered Medications   Medication Dose Route Frequency    polyethylene glycol (MIRALAX) packet 17 g  17 g Oral BID    amiodarone (CORDARONE) tablet 200 mg  200 mg Oral Q12H    benzonatate (TESSALON) capsule 100 mg  100 mg Oral Q8H PRN    finasteride (PROSCAR) tablet 5 mg  5 mg Oral DAILY    guaiFENesin-dextromethorphan (ROBITUSSIN DM) 100-10 mg/5 mL syrup 10 mL  10 mL Oral Q6H PRN    tamsulosin (FLOMAX) capsule 0.4 mg  0.4 mg Oral DAILY    simethicone (MYLICON) 32GW/7.4TP oral drops 80 mg  1.2 mL Oral Multiple    fentaNYL citrate (PF) injection  mcg   mcg IntraVENous Multiple    midazolam (VERSED) injection 1-10 mg  1-10 mg IntraVENous Multiple    pantoprazole (PROTONIX) tablet 40 mg  40 mg Oral ACB    0.9% sodium chloride infusion 250 mL  250 mL IntraVENous PRN    simethicone (MYLICON) 51WE/9.0UV oral drops 80 mg  1.2 mL Oral Multiple    sodium chloride (NS) flush 5-10 mL  5-10 mL IntraVENous Q8H    sodium chloride (NS) flush 5-10 mL  5-10 mL IntraVENous PRN    ondansetron (ZOFRAN) injection 4 mg  4 mg IntraVENous Q4H PRN    albuterol-ipratropium (DUO-NEB) 2.5 MG-0.5 MG/3 ML  3 mL Nebulization Q6HWA RT    mupirocin (BACTROBAN) 2 % ointment   Both Nostrils BID    LORazepam (ATIVAN) tablet 1 mg  1 mg Oral Q6H PRN      Allergies   Allergen Reactions    Augmentin [Amoxicillin-Pot Clavulanate] Rash    Ciprofloxacin Rash        Review of Systems:  A comprehensive review of systems was negative except for that written in the HPI.     Mental Status: no dementia    Objective:     Patient Vitals for the past 8 hrs:   BP Temp Pulse Resp SpO2   07/17/17 1145 145/71 97.9 °F (36.6 °C) 80 18 98 %   07/17/17 0758 154/69 97.7 °F (36.5 °C) 85 16 97 %   07/17/17 0754 - - - - 97 %     Temp (24hrs), Av.9 °F (36.6 °C), Min:97.7 °F (36.5 °C), Max:98.2 °F (36.8 °C)      EXAM: alert, cooperative, no distress, oriented, normal, normal mood,  Right hip non tender to palp. No pain with ROM. No swelling or erythema    Imaging Review: XR CT :    IMPRESSION  IMPRESSION:  No evidence of active GI bleed. Large amount of stool in the rectum. Large  complex right hip joint effusion could indicate hemarthrosis. Elevated right  hemidiaphragm with right lower lobe atelectasis    Labs:   Recent Results (from the past 24 hour(s))   CBC W/O DIFF    Collection Time: 17  4:07 AM   Result Value Ref Range    WBC 8.1 4.1 - 11.1 K/uL    RBC 2.64 (L) 4.10 - 5.70 M/uL    HGB 8.1 (L) 12.1 - 17.0 g/dL    HCT 23.4 (L) 36.6 - 50.3 %    MCV 88.6 80.0 - 99.0 FL    MCH 30.7 26.0 - 34.0 PG    MCHC 34.6 30.0 - 36.5 g/dL    RDW 17.5 (H) 11.5 - 14.5 %    PLATELET 596 729 - 982 K/uL   METABOLIC PANEL, BASIC    Collection Time: 17  4:07 AM   Result Value Ref Range    Sodium 136 136 - 145 mmol/L    Potassium 2.9 (L) 3.5 - 5.1 mmol/L    Chloride 105 97 - 108 mmol/L    CO2 22 21 - 32 mmol/L    Anion gap 9 5 - 15 mmol/L    Glucose 92 65 - 100 mg/dL    BUN 4 (L) 6 - 20 MG/DL    Creatinine 0.78 0.70 - 1.30 MG/DL    BUN/Creatinine ratio 5 (L) 12 - 20      GFR est AA >60 >60 ml/min/1.73m2    GFR est non-AA >60 >60 ml/min/1.73m2    Calcium 7.3 (L) 8.5 - 10.1 MG/DL         Impression:     Patient Active Problem List    Diagnosis Date Noted    GI bleed 2017    [facility]epsis [facility] to pneumonia [facility] 2017    E-coli UTI 2017    [facility]ulus ([facility] 2017    A-fib ([facility] 2017    Electrolyte abnormality 2017    Hypertension 2017    Respiratory failure ([facility] 2017     Principal Problem:    GI bleed (2017)        Plan:    Will order serum metal levels ,esr,sed rate  May follow up outpatient  Will likely need revision arthroplasty in the future  Will check labs but this is not and emergency    F/u with Dr. Keyshawn Cisneros within 1 week by calling for an appointment at [phone]. THIEN Noriega      This patient was seen and examined by be me personally with the findings as documented above by the NP/PA with the following changes/additions:    NONE - Appears to have reaction to metal on metal hip replacement. Will likely require revision hip replacement at some point in the future. This is not urgent and I would recommend again stable with his current medical problems at this time. All pertinent medical history, medications, and test results and imaging were reviewed by me personally. Plan for treatment is as described and formulated by me after discussion with the patient and family personally.

## 2017-07-17 NOTE — PROGRESS NOTES
Spiritual Care Partner Volunteer visited patient on 7/17/17. .  Documented by:   Rev. Delia Gonzalez.  Gopi Leon MA, BCC    Lead  [employer]

## 2017-07-17 NOTE — PROGRESS NOTES
Nutrition Assessment:    RECOMMENDATIONS:   Continue Cardiac diet  Pt refused PO supplements    DIETITIANS INTERVENTIONS/PLAN:   Continue diet as tolerated  Pt declined PO supplements  Monitor appetite/PO intake    ASSESSMENT:   Pt admitted with anemia 2' GI bleed. PMH: HTN, CKD, recently discharged. Chart reviewed, pt sitting up in chair awake and alert. GI is following. Pt is s/p colonoscopy this past weekend that showed ischemic colitis related to colonic ulcers. Noted pt will likely need surgical consult for hemicolectomy if ischemia worsens. Pt upgraded to Cardiac diet this morning. He consumed 100% of his lunch tray! He states his appetite is very good and he has not had any recent wt loss. I offered for him to try a variety of PO supplements, but he politely declined them all stating he wants to stick with what is sent on his trays for now. Provided pt with a menu and explanation of room service. He plans to order his meals. I told him that if he continues to consume 100% of his trays we can go with no ONS for now and check in in a few days. SUBJECTIVE/OBJECTIVE:   \"That spaashwinetti was really good! \"   Diet Order: Cardiac  % Eaten:  Patient Vitals for the past 72 hrs:   % Diet Eaten   07/17/17 0800 100 %   07/16/17 0830 50 %     Pertinent Medications:proscar, protonix, miralax    Chemistries:  Lab Results   Component Value Date/Time    Sodium 136 07/17/2017 04:07 AM    Potassium 2.9 07/17/2017 04:07 AM    Chloride 105 07/17/2017 04:07 AM    CO2 22 07/17/2017 04:07 AM    Anion gap 9 07/17/2017 04:07 AM    Glucose 92 07/17/2017 04:07 AM    BUN 4 07/17/2017 04:07 AM    Creatinine 0.78 07/17/2017 04:07 AM    BUN/Creatinine ratio 5 07/17/2017 04:07 AM    GFR est AA >60 07/17/2017 04:07 AM    GFR est non-AA >60 07/17/2017 04:07 AM    Calcium 7.3 07/17/2017 04:07 AM    AST (SGOT) 12 07/16/2017 03:38 AM    Alk.  phosphatase 30 07/16/2017 03:38 AM    Protein, total 4.0 07/16/2017 03:38 AM    Albumin 1.9 07/16/2017 03:38 AM    Globulin 2.1 07/16/2017 03:38 AM    A-G Ratio 0.9 07/16/2017 03:38 AM    ALT (SGPT) 12 07/16/2017 03:38 AM      Anthropometrics: Height: 6' (182.9 cm) Weight: 89.4 kg (197 lb)    IBW (%IBW):   ( ) UBW (%UBW): 89.4 kg (197 lb) (100 %)  . BMI: Body mass index is 26.72 kg/(m^2). This BMI is indicative of:  []Underweight   [x]Normal(for age)   []Overweight   [] Obesity   [] Extreme Obesity (BMI>40)  Estimated Nutrition Needs (Based on): 2006 Kcals/day (MSJ 1672 x 1.2) , 72 g (0.8gPro/kg) Protein  Carbohydrate: At Least 130 g/day  Fluids: 2000 mL/day    Last BM: 7/16   [x]Active     []Hyperactive  []Hypoactive       [] Absent   BS  Skin:    [x] Intact   [] Incision  [] Breakdown   [] DTI   [] Tears/Excoriation/Abrasion  [x]Edema(+1-BUE; +2 pitting-BLE) [] Other: Wt Readings from Last 30 Encounters:   07/17/17 89.4 kg (197 lb)   07/10/17 89.4 kg (197 lb)   07/03/17 88.7 kg (195 lb 8.8 oz)   06/30/17 89.4 kg (197 lb 1.5 oz)   11/05/16 95.9 kg (211 lb 6.7 oz)   10/29/13 93 kg (205 lb 0.4 oz)   12/18/12 96.8 kg (213 lb 6.5 oz)   06/06/10 95.2 kg (209 lb 14.1 oz)      NUTRITION DIAGNOSES:   Problem:       No nutritional dx at this time. NUTRITION INTERVENTIONS:  Meals/Snacks: General/healthful diet                  GOAL:   Pt will consume >70% of meals in 3-5 days.      Cultural, Yazdanism, or Ethnic Dietary Needs: None     LEARNING NEEDS (Diet, Food/Nutrient-Drug Interaction):    [x] None Identified   [] Identified and Education Provided/Documented   [] Identified and Pt declined/was not appropriate      [x] Interdisciplinary Care Plan Reviewed/Documented    [x] Participated in Discharge Planning:  Cardiac diet    [] Interdisciplinary Rounds     NUTRITION RISK:    [] High              [x] Moderate           [x]  Low  []  Minimal/Uncompromised    PT SEEN FOR:    [x]  MD Consult: []Calorie Count      []Diabetic Diet Education        []Diet Education     []Electrolyte Management     [x]General Nutrition Management and Supplements     []Management of Tube Feeding     []TPN Recommendations    []  RN Referral:  []MST score >=2     []Enteral/Parenteral Nutrition PTA     []Pregnant: Gestational DM or Multigestation   []  Low BMI  []  DTR Referral       Yemi Kumar RD, [address]    Pager [phone]  Weekend Pager [phone]

## 2017-07-17 NOTE — PROGRESS NOTES
Gastroenterology Daily Progress Note (Dr. Pollo Godoy)    Admit Date: 7/14/2017       Subjective:       Small normal colored BM last night. No abdominal pain. No passage of blood per rectum since colonoscopy. Current Facility-Administered Medications   Medication Dose Route Frequency    potassium chloride SR (KLOR-CON 10) tablet 40 mEq  40 mEq Oral NOW    polyethylene glycol (MIRALAX) packet 17 g  17 g Oral BID    amiodarone (CORDARONE) tablet 200 mg  200 mg Oral Q12H    benzonatate (TESSALON) capsule 100 mg  100 mg Oral Q8H PRN    finasteride (PROSCAR) tablet 5 mg  5 mg Oral DAILY    guaiFENesin-dextromethorphan (ROBITUSSIN DM) 100-10 mg/5 mL syrup 10 mL  10 mL Oral Q6H PRN    tamsulosin (FLOMAX) capsule 0.4 mg  0.4 mg Oral DAILY    simethicone (MYLICON) 77JZ/0.3DO oral drops 80 mg  1.2 mL Oral Multiple    fentaNYL citrate (PF) injection  mcg   mcg IntraVENous Multiple    midazolam (VERSED) injection 1-10 mg  1-10 mg IntraVENous Multiple    pantoprazole (PROTONIX) tablet 40 mg  40 mg Oral ACB    0.9% sodium chloride infusion 250 mL  250 mL IntraVENous PRN    simethicone (MYLICON) 89SE/2.3GI oral drops 80 mg  1.2 mL Oral Multiple    sodium chloride (NS) flush 5-10 mL  5-10 mL IntraVENous Q8H    sodium chloride (NS) flush 5-10 mL  5-10 mL IntraVENous PRN    ondansetron (ZOFRAN) injection 4 mg  4 mg IntraVENous Q4H PRN    albuterol-ipratropium (DUO-NEB) 2.5 MG-0.5 MG/3 ML  3 mL Nebulization Q6HWA RT    mupirocin (BACTROBAN) 2 % ointment   Both Nostrils BID    LORazepam (ATIVAN) tablet 1 mg  1 mg Oral Q6H PRN        Objective:     Visit Vitals    /71    Pulse 97    Temp 98 °F (36.7 °C)    Resp 14    Wt 89.4 kg (197 lb)    SpO2 100%    BMI 26.72 kg/m2   Blood pressure 145/71, pulse 97, temperature 98 °F (36.7 °C), resp. rate 14, weight 89.4 kg (197 lb), SpO2 100 %.         07/15 1901 - 07/17 0700  In: 8695 [P.O.:400;  I.V.:375]  Out: 550 [Urine:550]      Intake/Output Summary (Last 24 hours) at 07/17/17 0719  Last data filed at 07/16/17 1100   Gross per 24 hour   Intake              400 ml   Output              550 ml   Net             -150 ml     Physical Exam:     General: awake, elderly, pale WM in NAD  Chest:  CTA, No rhonchi, rales or rubs.   Heart: S1, S2, RRR  GI: Soft, nontender to palpation, nondistended, + BS    Labs:       Recent Results (from the past 24 hour(s))   HEMOGLOBIN    Collection Time: 07/16/17 11:30 AM   Result Value Ref Range    HGB 8.0 (L) 12.1 - 17.0 g/dL   PROTHROMBIN TIME + INR    Collection Time: 07/16/17 11:30 AM   Result Value Ref Range    INR 1.4 (H) 0.9 - 1.1      Prothrombin time 14.0 (H) 9.0 - 11.1 sec   CBC W/O DIFF    Collection Time: 07/17/17  4:07 AM   Result Value Ref Range    WBC 8.1 4.1 - 11.1 K/uL    RBC 2.64 (L) 4.10 - 5.70 M/uL    HGB 8.1 (L) 12.1 - 17.0 g/dL    HCT 23.4 (L) 36.6 - 50.3 %    MCV 88.6 80.0 - 99.0 FL    MCH 30.7 26.0 - 34.0 PG    MCHC 34.6 30.0 - 36.5 g/dL    RDW 17.5 (H) 11.5 - 14.5 %    PLATELET 143 031 - 189 K/uL   METABOLIC PANEL, BASIC    Collection Time: 07/17/17  4:07 AM   Result Value Ref Range    Sodium 136 136 - 145 mmol/L    Potassium 2.9 (L) 3.5 - 5.1 mmol/L    Chloride 105 97 - 108 mmol/L    CO2 22 21 - 32 mmol/L    Anion gap 9 5 - 15 mmol/L    Glucose 92 65 - 100 mg/dL    BUN 4 (L) 6 - 20 MG/DL    Creatinine 0.78 0.70 - 1.30 MG/DL    BUN/Creatinine ratio 5 (L) 12 - 20      GFR est AA >60 >60 ml/min/1.73m2    GFR est non-AA >60 >60 ml/min/1.73m2    Calcium 7.3 (L) 8.5 - 10.1 MG/DL   LABRCNT(wbc:2,hgb:2,hct:2,plt:2,)  Recent Labs      07/17/17   0407  07/16/17   0338  07/15/17   1226   NA  136  138  141   K  2.9*  3.0*  3.3*   CL  105  108  110*   CO2  22  24  23   BUN  4*  5*  8   CREA  0.78  0.62*  0.70   GLU  92  80  92   CA  7.3*  6.6*  6.8*   MG   --   2.0  2.8*     Recent Labs      07/16/17   1130   INR  1.4*   PTP  14.0*     CT ABD/PELVIS WITH AND WITHOUT CONTRAST 7/14/17:  TECHNIQUE:   Thin axial images were obtained through the abdomen and pelvis. Then, following  the uneventful intravenous administration of 100 cc Isovue-370, thin axial  images were obtained through the abdomen and pelvis in portal venous and delayed  phases. Coronal and sagittal reconstructions were generated. Oral contrast was  not administered. CT dose reduction was achieved through use of a standardized  protocol tailored for this examination and automatic exposure control for dose  modulation.     FINDINGS:   LUNG BASES: Elevated right hemidiaphragm. Right lower lobe atelectasis. INCIDENTALLY IMAGED HEART AND MEDIASTINUM: Normal heart size. Coronary artery  calcifications present. LIVER: No mass or biliary dilatation. GALLBLADDER: Unremarkable. SPLEEN: No mass. PANCREAS: No mass or ductal dilatation. ADRENALS: Unremarkable. KIDNEYS: No mass, calculus, or hydronephrosis. Bilateral renal cortical  scarring. STOMACH: Unremarkable. SMALL BOWEL: No dilatation or wall thickening. No evidence of active bleed. COLON: No dilatation or wall thickening. Large amount stool in the rectum. No  evidence of active bleed. APPENDIX: Unremarkable. PERITONEUM: No ascites or pneumoperitoneum. RETROPERITONEUM: No lymphadenopathy or aortic aneurysm. REPRODUCTIVE ORGANS: Normal sized prostate gland. URINARY BLADDER: No mass or calculus. BONES: No destructive bone lesion. Right hip prosthesis, with large complex  joint effusion. ADDITIONAL COMMENTS: N/A     IMPRESSION  IMPRESSION:  No evidence of active GI bleed. Large amount of stool in the rectum. Large  complex right hip joint effusion could indicate hemarthrosis.  Elevated right  hemidiaphragm with right lower lobe atelectasis  Impression:    Acute GI blood loss anemia  Ischemic colitis with hemorrhage  Recent Sigmoid volvulus with very dilated colon on recent colonoscopy         Plan:  Patient with ischemic changes seen on colonoscopy yesterday with site of visible vessel that may have been result of recent volvulus of sigmoid colon.   H/H stable and hemodynamically stable.  -advance diet to GI lite slowly  -follow H/H daily  -consider surgery consult since may require hemicolectomy if ischemia worsens and the recent volvulus       Anastasia Morris MD    7/17/2017  [address], [address] [place] [address]  [phone] [id number]  [address] [place] [address] [place]: [phone]

## 2017-07-17 NOTE — PROGRESS NOTES
End of Shift Nursing Note    Bedside shift change report given to Nadia Jacobs RN (oncoming nurse) by Ajit Kumari (offgoing nurse). Report included the following information SBAR, Kardex, ED Summary, Procedure Summary, Intake/Output, MAR, Accordion and Recent Results. Significant changes during shift:  Pt had ortho consult today. Pt with bibasilar crackles,  CXR showed atelectasis at right base, IS initiated to 1000. Uneventful shift. Non-emergent issues for physician to address:   none     Number times ambulated in hallway past shift: 1      Number of times OOB to chair past shift: 2     Vital Signs:    Temp: 97.9 °F (36.6 °C)     Pulse (Heart Rate): 80     BP: 145/71     Resp Rate: 18     O2 Sat (%): 98 %    Lines & Drains:     Urinary Catheter? No       NG tube [] in [] removed [x] not applicable   Drains [] in [] removed [x] not applicable     Skin Integrity:      Wounds: no   Dressings Present: no    Wound Concerns: no      GI:    Current diet:  DIET CARDIAC Regular    Nausea: no  Vomiting: NO  Bowel Sounds: YES  Flatus: YES  Last Bowel Movement: yesterday       Respiratory:  Supplemental O2: No      Incentive Spirometer: YES  Volume: 1000  Coughing and Deep Breathing: YES  Oral Care: YES  Understanding (patient/family education): YES   Getting out of bed: YES  Head of bed elevation: YES    Patient Safety:    Falls Score: 2  Mobility Score: 1  Bed Alarm On? No  Sitter? No      Opportunity for questions and clarification was given to oncoming nurse. Patient bed is in low position, side rails are up x 2, door & observation blinds open as needed, call bell within reach and patient not in distress.     Balbir Mcduffie, JOSE EDUARDO

## 2017-07-18 LAB
ALBUMIN SERPL BCP-MCNC: 2.4 G/DL (ref 3.5–5)
ALBUMIN/GLOB SERPL: 0.9 {RATIO} (ref 1.1–2.2)
ALP SERPL-CCNC: 49 U/L (ref 45–117)
ALT SERPL-CCNC: 16 U/L (ref 12–78)
ANION GAP BLD CALC-SCNC: 9 MMOL/L (ref 5–15)
AST SERPL W P-5'-P-CCNC: 17 U/L (ref 15–37)
BASOPHILS # BLD AUTO: 0.1 K/UL (ref 0–0.1)
BASOPHILS # BLD: 1 % (ref 0–1)
BILIRUB SERPL-MCNC: 0.7 MG/DL (ref 0.2–1)
BUN SERPL-MCNC: 4 MG/DL (ref 6–20)
BUN/CREAT SERPL: 4 (ref 12–20)
CALCIUM SERPL-MCNC: 7.4 MG/DL (ref 8.5–10.1)
CHLORIDE SERPL-SCNC: 102 MMOL/L (ref 97–108)
CO2 SERPL-SCNC: 24 MMOL/L (ref 21–32)
CREAT SERPL-MCNC: 0.89 MG/DL (ref 0.7–1.3)
CRP SERPL-MCNC: 1.43 MG/DL (ref 0–0.6)
EOSINOPHIL # BLD: 0.2 K/UL (ref 0–0.4)
EOSINOPHIL NFR BLD: 2 % (ref 0–7)
ERYTHROCYTE [DISTWIDTH] IN BLOOD BY AUTOMATED COUNT: 17.3 % (ref 11.5–14.5)
ERYTHROCYTE [SEDIMENTATION RATE] IN BLOOD: 30 MM/HR (ref 0–20)
GLOBULIN SER CALC-MCNC: 2.8 G/DL (ref 2–4)
GLUCOSE SERPL-MCNC: 91 MG/DL (ref 65–100)
HCT VFR BLD AUTO: 25.2 % (ref 36.6–50.3)
HGB BLD-MCNC: 8.8 G/DL (ref 12.1–17)
LYMPHOCYTES # BLD AUTO: 29 % (ref 12–49)
LYMPHOCYTES # BLD: 2.5 K/UL (ref 0.8–3.5)
MCH RBC QN AUTO: 31.8 PG (ref 26–34)
MCHC RBC AUTO-ENTMCNC: 34.9 G/DL (ref 30–36.5)
MCV RBC AUTO: 91 FL (ref 80–99)
MONOCYTES # BLD: 0.6 K/UL (ref 0–1)
MONOCYTES NFR BLD AUTO: 7 % (ref 5–13)
NEUTS SEG # BLD: 5.1 K/UL (ref 1.8–8)
NEUTS SEG NFR BLD AUTO: 61 % (ref 32–75)
PLATELET # BLD AUTO: 278 K/UL (ref 150–400)
POTASSIUM SERPL-SCNC: 3 MMOL/L (ref 3.5–5.1)
PROT SERPL-MCNC: 5.2 G/DL (ref 6.4–8.2)
RBC # BLD AUTO: 2.77 M/UL (ref 4.1–5.7)
SODIUM SERPL-SCNC: 135 MMOL/L (ref 136–145)
WBC # BLD AUTO: 8.3 K/UL (ref 4.1–11.1)

## 2017-07-18 PROCEDURE — 85025 COMPLETE CBC W/AUTO DIFF WBC: CPT | Performed by: PHYSICIAN ASSISTANT

## 2017-07-18 PROCEDURE — 80053 COMPREHEN METABOLIC PANEL: CPT

## 2017-07-18 PROCEDURE — 94640 AIRWAY INHALATION TREATMENT: CPT

## 2017-07-18 PROCEDURE — 3331090001 HH PPS REVENUE CREDIT

## 2017-07-18 PROCEDURE — 65270000029 HC RM PRIVATE

## 2017-07-18 PROCEDURE — 36415 COLL VENOUS BLD VENIPUNCTURE: CPT | Performed by: PHYSICIAN ASSISTANT

## 2017-07-18 PROCEDURE — 74011250637 HC RX REV CODE- 250/637: Performed by: INTERNAL MEDICINE

## 2017-07-18 PROCEDURE — G8978 MOBILITY CURRENT STATUS: HCPCS | Performed by: PHYSICAL THERAPIST

## 2017-07-18 PROCEDURE — 74011250637 HC RX REV CODE- 250/637: Performed by: SPECIALIST

## 2017-07-18 PROCEDURE — 82495 ASSAY OF CHROMIUM: CPT | Performed by: PHYSICIAN ASSISTANT

## 2017-07-18 PROCEDURE — 83018 HEAVY METAL QUAN EACH NES: CPT | Performed by: PHYSICIAN ASSISTANT

## 2017-07-18 PROCEDURE — 3331090002 HH PPS REVENUE DEBIT

## 2017-07-18 PROCEDURE — 85652 RBC SED RATE AUTOMATED: CPT | Performed by: PHYSICIAN ASSISTANT

## 2017-07-18 PROCEDURE — G8979 MOBILITY GOAL STATUS: HCPCS | Performed by: PHYSICAL THERAPIST

## 2017-07-18 PROCEDURE — 97161 PT EVAL LOW COMPLEX 20 MIN: CPT | Performed by: PHYSICAL THERAPIST

## 2017-07-18 PROCEDURE — 86140 C-REACTIVE PROTEIN: CPT | Performed by: PHYSICIAN ASSISTANT

## 2017-07-18 PROCEDURE — 74011000250 HC RX REV CODE- 250: Performed by: SPECIALIST

## 2017-07-18 PROCEDURE — 77010033678 HC OXYGEN DAILY

## 2017-07-18 PROCEDURE — 74011250637 HC RX REV CODE- 250/637: Performed by: FAMILY MEDICINE

## 2017-07-18 RX ORDER — FINASTERIDE 5 MG/1
2.5 TABLET, FILM COATED ORAL DAILY
Status: DISCONTINUED | OUTPATIENT
Start: 2017-07-19 | End: 2017-07-19 | Stop reason: HOSPADM

## 2017-07-18 RX ORDER — POTASSIUM CHLORIDE 750 MG/1
40 TABLET, FILM COATED, EXTENDED RELEASE ORAL
Status: COMPLETED | OUTPATIENT
Start: 2017-07-18 | End: 2017-07-18

## 2017-07-18 RX ORDER — POTASSIUM CHLORIDE 1.5 G/1.77G
40 POWDER, FOR SOLUTION ORAL
Status: COMPLETED | OUTPATIENT
Start: 2017-07-18 | End: 2017-07-18

## 2017-07-18 RX ADMIN — POTASSIUM CHLORIDE 40 MEQ: 750 TABLET, FILM COATED, EXTENDED RELEASE ORAL at 10:28

## 2017-07-18 RX ADMIN — MUPIROCIN: 20 OINTMENT TOPICAL at 17:35

## 2017-07-18 RX ADMIN — IPRATROPIUM BROMIDE AND ALBUTEROL SULFATE 3 ML: .5; 3 SOLUTION RESPIRATORY (INHALATION) at 15:01

## 2017-07-18 RX ADMIN — LORAZEPAM 1 MG: 1 TABLET ORAL at 15:39

## 2017-07-18 RX ADMIN — Medication 10 ML: at 22:13

## 2017-07-18 RX ADMIN — POTASSIUM CHLORIDE 40 MEQ: 1.5 POWDER, FOR SOLUTION ORAL at 12:36

## 2017-07-18 RX ADMIN — GUAIFENESIN AND DEXTROMETHORPHAN 10 ML: 100; 10 SYRUP ORAL at 21:56

## 2017-07-18 RX ADMIN — IPRATROPIUM BROMIDE AND ALBUTEROL SULFATE 3 ML: .5; 3 SOLUTION RESPIRATORY (INHALATION) at 19:31

## 2017-07-18 RX ADMIN — FINASTERIDE 5 MG: 5 TABLET, FILM COATED ORAL at 09:02

## 2017-07-18 RX ADMIN — AMIODARONE HYDROCHLORIDE 200 MG: 200 TABLET ORAL at 09:01

## 2017-07-18 RX ADMIN — AMIODARONE HYDROCHLORIDE 200 MG: 200 TABLET ORAL at 20:49

## 2017-07-18 RX ADMIN — MUPIROCIN: 20 OINTMENT TOPICAL at 09:02

## 2017-07-18 RX ADMIN — TAMSULOSIN HYDROCHLORIDE 0.4 MG: 0.4 CAPSULE ORAL at 09:02

## 2017-07-18 RX ADMIN — POLYETHYLENE GLYCOL 3350 17 G: 17 POWDER, FOR SOLUTION ORAL at 17:35

## 2017-07-18 RX ADMIN — GUAIFENESIN AND DEXTROMETHORPHAN 10 ML: 100; 10 SYRUP ORAL at 11:42

## 2017-07-18 RX ADMIN — POLYETHYLENE GLYCOL 3350 17 G: 17 POWDER, FOR SOLUTION ORAL at 09:02

## 2017-07-18 RX ADMIN — IPRATROPIUM BROMIDE AND ALBUTEROL SULFATE 3 ML: .5; 3 SOLUTION RESPIRATORY (INHALATION) at 07:53

## 2017-07-18 RX ADMIN — PANTOPRAZOLE SODIUM 40 MG: 40 TABLET, DELAYED RELEASE ORAL at 07:30

## 2017-07-18 NOTE — PROGRESS NOTES
Problem: Mobility Impaired (Adult and Pediatric)  Goal: *Acute Goals and Plan of Care (Insert Text)  Physical Therapy Goals  Initiated 7/18/2017  1. Patient will move from supine to sit and sit to supine , scoot up and down and roll side to side in bed with independence within 7 day(s). 2. Patient will transfer from bed to chair and chair to bed with modified independence using the least restrictive device within 7 day(s). 3. Patient will perform sit to stand with modified independence within 7 day(s). 4. Patient will ambulate with modified independence for 150 feet with the least restrictive device within 7 day(s). 5. Patient will ascend/descend 4 stairs with 1 handrail(s) with modified independence within 7 day(s). PHYSICAL THERAPY EVALUATION  Patient: Diane Hardy (18 y.o. male)  Date: 7/18/2017  Primary Diagnosis: anemia  GI bleed  GI Bleed  Procedure(s) (LRB):  COLONOSCOPY (N/A)  RESOLUTION CLIP (N/A) 3 Days Post-Op   Precautions: falls        ASSESSMENT :  Based on the objective data described below, the patient presents with orthostatic hypotension which limits functional mobility. Patient sitting in chair upon arrival. Patient able to transition to standing with supervision. Once standing BP noted to be 82/49 and did not improve with marching in place. Patient returned to sitting and BP improved to 122/53. Further therapy aborted and nursing notified. Prior to admission patient was ambulating with RW at home and receiving HHPT following recent hospital admission. Recommend return to home with HHPT following discharge. Patient will benefit from skilled intervention to address the above impairments.   Patients rehabilitation potential is considered to be Good  Factors which may influence rehabilitation potential include:   [ ]         None noted  [ ]         Mental ability/status  [X]         Medical condition  [ ]         Home/family situation and support systems  [ ]         Safety awareness  [ ] Pain tolerance/management  [ ]         Other:        PLAN :  Recommendations and Planned Interventions:  [X]           Bed Mobility Training             [ ]    Neuromuscular Re-Education  [X]           Transfer Training                   [ ]    Orthotic/Prosthetic Training  [X]           Gait Training                         [ ]    Modalities  [ ]           Therapeutic Exercises           [ ]    Edema Management/Control  [X]           Therapeutic Activities            [X]    Patient and Family Training/Education  [X]           Other (comment):stairs     Frequency/Duration: Patient will be followed by physical therapy  4 times a week to address goals. Discharge Recommendations: Home Health  Further Equipment Recommendations for Discharge: has appropriate equipment       SUBJECTIVE:   Patient stated I really want to go home.       OBJECTIVE DATA SUMMARY:   HISTORY:    Past Medical History:   Diagnosis Date    Chronic kidney disease      Pacemaker 2013     Past Surgical History:   Procedure Laterality Date    COLONOSCOPY N/A 7/2/2017     COLONOSCOPY ER 21 performed by David Villa MD at [facility] ENDOSCOPY    COLONOSCOPY N/A 7/15/2017     COLONOSCOPY performed by Alexsandra Elder MD at [facility] ENDOSCOPY    COLONOSCOPY,FLEX,W/CONTROL, BLEEDING   7/15/2017          HX ORTHOPAEDIC         hip replacement, knee, back     Prior Level of Function/Home Situation: patient reports modified independence with all mobility; using RW for ambulation; receiving HHPT        Home Situation  Home Environment: Private residence  # Steps to Enter: 5  Rails to Enter: Yes  Hand Rails : Bilateral  One/Two Story Residence: One story  Living Alone: No  Support Systems: Family member(s), Spouse/Significant Other/Partner  Patient Expects to be Discharged to[de-identified] Private residence  Current DME Used/Available at Home: Walker, rolling, Oxygen, portable  Tub or Shower Type: Shower     EXAMINATION/PRESENTATION/DECISION MAKING:   Critical Behavior:  Neurologic State: Alert, Appropriate for age  Orientation Level: Oriented X4        Hearing: Auditory  Auditory Impairment: None     Range Of Motion:  AROM: Generally decreased, functional                       Strength:    Strength: Generally decreased, functional                    Tone & Sensation:   Tone: Normal                              Coordination:  Coordination: Within functional limits  Vision:      Functional Mobility:  Bed Mobility:   deferred- upon arrival patient sitting in chair            Transfers:  Sit to Stand: Supervision  Stand to Sit: Supervision                       Balance:   Sitting: Intact  Standing: Impaired  Standing - Static: Good;Constant support  Standing - Dynamic : Good (using RW to march in place)  Ambulation/Gait Training:      Deferred secondary to orthostatic hypotension           Functional Measure:     Elder Mobility Scale      9/20            EMS and G-code impairment scale:  Percentage of impairment CH  0% CI  1-19% CJ  20-39% CK  40-59% CL  60-79% CM  80-99% CN  100%   EMS Score 0-20 20 17-19 13-16 9-12 5-8 1-4 0      Scores under 10  generally these patients are dependent in mobility maneuvers; require help with  basic ADL, such as transfers, toileting and dressing. Scores between 10  13  generally these patients are borderline in terms of safe mobility and  independence in ADL i.e. they require some help with some mobility maneuvers. Scores over 14  Generally these patients are able to perform mobility maneuvers alone and safely  and are independent in basic ADL. G codes: In compliance with CMSs Claims Based Outcome Reporting, the following G-code set was chosen for this patient based on their primary functional limitation being treated: The outcome measure chosen to determine the severity of the functional limitation was the Elderly mobility scale with a score of 9/20 which was correlated with the impairment scale. · Mobility - Walking and Moving Around:               - CURRENT STATUS:    CK - 40%-59% impaired, limited or restricted               - GOAL STATUS:           CI - 1%-19% impaired, limited or restricted               - D/C STATUS:                       ---------------To be determined---------------            Pain:  Pain Scale 1: Numeric (0 - 10)  Pain Intensity 1: 0              Activity Tolerance:   Orthostatic hypotension in standing  Please refer to the flowsheet for vital signs taken during this treatment. After treatment:   [X]         Patient left in no apparent distress sitting up in chair  [ ]         Patient left in no apparent distress in bed  [X]         Call bell left within reach  [X]         Nursing notified  [ ]         Caregiver present  [ ]         Bed alarm activated      COMMUNICATION/EDUCATION:   The patients plan of care was discussed with: Registered Nurse,  and Rehabilitation Attendant. [X]         Fall prevention education was provided and the patient/caregiver indicated understanding. [X]         Patient/family have participated as able in goal setting and plan of care. [X]         Patient/family agree to work toward stated goals and plan of care. [ ]         Patient understands intent and goals of therapy, but is neutral about his/her participation. [ ]         Patient is unable to participate in goal setting and plan of care.      Thank you for this referral.  Ebonie Yoon, PT   Time Calculation: 16 mins

## 2017-07-18 NOTE — PROGRESS NOTES
Pt is a 44 y/o  male admitted for GI Bleed and anemia. Pt lives with spouse in a one story home with four steps to the entrance of the residence. Pt is independent with ADL's at baseline to include driving. Pt had HH prior to admission and will require resumption orders at discharge for [facility]. Pt has no previous SNF providers. Pt has access to rolling walker and has access to home O2 supplied by [employer] at 2L. Pt prefers to use [facility] in [employer] on [address]. Pt will be transported at discharge by spouse via private vehicle. CM met with spouse to complete initial assessment. Pt is alert and oriented to person, place,time and situation. CM will continue to follow pt to assist with discharge plans as needed. Care Management Interventions  PCP Verified by CM: Yes  Mode of Transport at Discharge: Other (see comment)  Transition of Care Consult (CM Consult): [address]: Yes  Discharge Durable Medical Equipment: No  Health Maintenance Reviewed: Yes  Physical Therapy Consult: Yes  Occupational Therapy Consult: No  Speech Therapy Consult: No  Current Support Network: Lives with Spouse  Confirm Follow Up Transport: Self  Plan discussed with Pt/Family/Caregiver: Yes  Discharge Location  Discharge Placement: Home with home health    [facility], [address]   [phone]    Update:     CM sent referral for [facility] to [facility] and pt has been accepted for resumption of services.      [facility], [address]   [phone]

## 2017-07-18 NOTE — PROGRESS NOTES
Ortho -   CRP and Sed Rate are unconcerning. Antigo and Chromium labs pending - no need to delay discharge for these, which can take awhile. No bone lysis on scans - should follow  F/u in office in a few weeks  Consider revision of metal-on-metal hip to ceramic on plastic electively. WBAT  Call with questions.

## 2017-07-18 NOTE — PROGRESS NOTES
Hospitalist Progress Note    NAME: Anna Real   :  1940   MRN:  [id number]       Assessment / Plan:  GI Bleeding: Diverticular Bleed ruled out; 7/15 Colonoscopy showed Ischemic Colitis and Colonic Ulceration (had Volvulus last admission)  -Hgb stable, transferred out of ICU on  and Femoral CVC removed  -diet advanced, tolerated well    Acute Blood Loss Anemia  -s/p 5 units of blood, Hgb stable at 8.8, continue to monitor daily unless indicated more frequently    Incidental note of Right hip Effusion on CT A/P from   -Appreciate orthopedic Surgery consult, will need OP follow-up     Atrial Fibrillation  -hold ASA, PO Amiodarone restarted    Hypokalemia: persists  -replete potassium today and monitor    Mild Hypocalcemia: improved after repletion    Hypertension  -hold home Metoprolol at this time as BP borderline low, pt denied any orthostatic sx, monitor    Discharged on  with PNA and respiratory failure (coarse breath sound noted at this time per review of discharge summary physical exam), no TTE available in system  -CXR on : \"Comparison to 2017. Portable exam obtained at 5959 demonstrates little change in the elevation of the right hemidiaphragm with right basilar  atelectasis compared to prior examination. IMPRESSION  Impression: No change compared to the prior exam.\"  Will need repeat CXR in 2-3 weeks post discharge    Severe Protein Calorie Malnutrition: albunim 1.9  -nutrition consult     Body mass index is 26.72 kg/(m^2).     Code status: Full  Prophylaxis: SCD's  Recommended Disposition: TBD     Subjective:     Chief Complaint / Reason for Physician Visit: follow-up GIB/Anemia  Patient feels better today, no pain or recurrent bleeding  Denies right hip pain, no acute events overnight per nursing    Review of Systems:  Symptom Y/N Comments  Symptom Y/N Comments   Fever/Chills n   Chest Pain n    Poor Appetite    Edema y \"same as always\"   Cough n   Abdominal Pain n    Sputum Joint Pain     SOB/ANDERSON n   Pruritis/Rash     Nausea/vomit    Tolerating PT/OT     Diarrhea n   Tolerating Diet     Constipation n   Other       Could NOT obtain due to:      Objective:     VITALS:   Last 24hrs VS reviewed since prior progress note. Most recent are:  Patient Vitals for the past 24 hrs:   Temp Pulse Resp BP SpO2   07/18/17 0855 98.9 °F (37.2 °C) 92 17 104/43 96 %   07/18/17 0753 - - - - 95 %   07/18/17 0345 98 °F (36.7 °C) 88 17 144/66 98 %   07/17/17 2335 98.1 °F (36.7 °C) 89 18 142/71 100 %   07/17/17 2127 - - - - 97 %   07/17/17 1954 97.7 °F (36.5 °C) 82 17 142/74 98 %   07/17/17 1515 97.4 °F (36.3 °C) 86 18 135/48 97 %   07/17/17 1145 97.9 °F (36.6 °C) 80 18 145/71 98 %       Intake/Output Summary (Last 24 hours) at 07/18/17 0920  Last data filed at 07/17/17 2127   Gross per 24 hour   Intake              600 ml   Output                0 ml   Net              600 ml        PHYSICAL EXAM:  General: WD, WN. Alert, cooperative, no acute distress    EENT:  EOMI. Anicteric sclerae. MM dry  Resp:  CTA BL no wheezing or rhonchi. No accessory muscle use  CV:  Regular  rhythm,  Trace BLE edema  GI:  Soft, Non distended, Non tender. Hypoactive Bowel sounds  Neurologic:  Alert and oriented X 3, normal speech,   Psych:   Good insight. Not anxious nor agitated  Skin:  No rashes. No jaundice    Reviewed most current lab test results and cultures  YES  Reviewed most current radiology test results   YES  Review and summation of old records today    NO  Reviewed patient's current orders and MAR    YES  PMH/SH reviewed - no change compared to H&P  ________________________________________________________________________  Care Plan discussed with:    Comments   Patient x    Family      RN x    Care Manager x    Consultant                        Multidiciplinary team rounds were held today with , nursing, pharmacist and clinical coordinator.   Patient's plan of care was discussed; medications were reviewed and discharge planning was addressed. ________________________________________________________________________  Total NON critical care TIME:  25   Minutes    Total CRITICAL CARE TIME Spent:   Minutes non procedure based      Comments   >50% of visit spent in counseling and coordination of care     ________________________________________________________________________  José Luis Spear MD     Procedures: see electronic medical records for all procedures/Xrays and details which were not copied into this note but were reviewed prior to creation of Plan. LABS:  I reviewed today's most current labs and imaging studies.   Pertinent labs include:  Recent Labs      07/18/17 0327  07/17/17   0407  07/16/17   1130  07/16/17   0338   WBC  8.3  8.1   --   6.8   HGB  8.8*  8.1*  8.0*  7.7*   HCT  25.2*  23.4*   --   21.8*   PLT  278  254   --   200     Recent Labs      07/18/17   0328  07/17/17   0407  07/16/17   1130  07/16/17   0338  07/15/17   1226   NA  135*  136   --   138  141   K  3.0*  2.9*   --   3.0*  3.3*   CL  102  105   --   108  110*   CO2  24  22   --   24  23   GLU  91  92   --   80  92   BUN  4*  4*   --   5*  8   CREA  0.89  0.78   --   0.62*  0.70   CA  7.4*  7.3*   --   6.6*  6.8*   MG   --    --    --   2.0  2.8*   ALB  2.4*   --    --   1.9*   --    TBILI  0.7   --    --   0.7   --    SGOT  17   --    --   12*   --    ALT  16   --    --   12   --    INR   --    --   1.4*   --    --        Signed: José Luis Spear MD

## 2017-07-18 NOTE — PROGRESS NOTES
Gastroenterology Daily Progress Note (Dr. Rannie Boeck)    Admit Date: 7/14/2017       Subjective:       Normal BM. No blood in stool. Sitting up in chair. Feeling stronger, eating better. Current Facility-Administered Medications   Medication Dose Route Frequency    polyethylene glycol (MIRALAX) packet 17 g  17 g Oral BID    amiodarone (CORDARONE) tablet 200 mg  200 mg Oral Q12H    benzonatate (TESSALON) capsule 100 mg  100 mg Oral Q8H PRN    finasteride (PROSCAR) tablet 5 mg  5 mg Oral DAILY    guaiFENesin-dextromethorphan (ROBITUSSIN DM) 100-10 mg/5 mL syrup 10 mL  10 mL Oral Q6H PRN    tamsulosin (FLOMAX) capsule 0.4 mg  0.4 mg Oral DAILY    simethicone (MYLICON) 29OA/0.7JY oral drops 80 mg  1.2 mL Oral Multiple    fentaNYL citrate (PF) injection  mcg   mcg IntraVENous Multiple    midazolam (VERSED) injection 1-10 mg  1-10 mg IntraVENous Multiple    pantoprazole (PROTONIX) tablet 40 mg  40 mg Oral ACB    0.9% sodium chloride infusion 250 mL  250 mL IntraVENous PRN    simethicone (MYLICON) 54BO/1.6LC oral drops 80 mg  1.2 mL Oral Multiple    sodium chloride (NS) flush 5-10 mL  5-10 mL IntraVENous Q8H    sodium chloride (NS) flush 5-10 mL  5-10 mL IntraVENous PRN    ondansetron (ZOFRAN) injection 4 mg  4 mg IntraVENous Q4H PRN    albuterol-ipratropium (DUO-NEB) 2.5 MG-0.5 MG/3 ML  3 mL Nebulization Q6HWA RT    mupirocin (BACTROBAN) 2 % ointment   Both Nostrils BID    LORazepam (ATIVAN) tablet 1 mg  1 mg Oral Q6H PRN        Objective:     Visit Vitals    /55 (BP 1 Location: Right arm, BP Patient Position: Sitting)    Pulse 90    Temp 98.8 °F (37.1 °C)    Resp 18    Ht 6' (1.829 m)    Wt 89.4 kg (197 lb)    SpO2 96%    BMI 26.72 kg/m2   Blood pressure 118/55, pulse 90, temperature 98.8 °F (37.1 °C), resp.  rate 18, height 6' (1.829 m), weight 89.4 kg (197 lb), SpO2 96 %.    07/18 0701 - 07/18 1900  In: 240 [P.O.:240]  Out: 240 [Urine:240]    07/16 1901 - 07/18 0700  In: 840 [P.O.:840]  Out: -       Intake/Output Summary (Last 24 hours) at 07/18/17 1146  Last data filed at 07/18/17 0948   Gross per 24 hour   Intake              840 ml   Output              240 ml   Net              600 ml     Physical Exam:     General: awake, elderly, pale WM in NAD  Chest:  CTA, No rhonchi, rales or rubs. Heart: S1, S2, RRR  GI: Soft, nontender to palpation, nondistended, + BS    Labs:       Recent Results (from the past 24 hour(s))   C REACTIVE PROTEIN, QT    Collection Time: 07/18/17  3:27 AM   Result Value Ref Range    C-Reactive protein 1.43 (H) 0.00 - 0.60 mg/dL   SED RATE (ESR)    Collection Time: 07/18/17  3:27 AM   Result Value Ref Range    Sed rate, automated 30 (H) 0 - 20 mm/hr   CBC WITH AUTOMATED DIFF    Collection Time: 07/18/17  3:27 AM   Result Value Ref Range    WBC 8.3 4.1 - 11.1 K/uL    RBC 2.77 (L) 4.10 - 5.70 M/uL    HGB 8.8 (L) 12.1 - 17.0 g/dL    HCT 25.2 (L) 36.6 - 50.3 %    MCV 91.0 80.0 - 99.0 FL    MCH 31.8 26.0 - 34.0 PG    MCHC 34.9 30.0 - 36.5 g/dL    RDW 17.3 (H) 11.5 - 14.5 %    PLATELET 245 460 - 175 K/uL    NEUTROPHILS 61 32 - 75 %    LYMPHOCYTES 29 12 - 49 %    MONOCYTES 7 5 - 13 %    EOSINOPHILS 2 0 - 7 %    BASOPHILS 1 0 - 1 %    ABS. NEUTROPHILS 5.1 1.8 - 8.0 K/UL    ABS. LYMPHOCYTES 2.5 0.8 - 3.5 K/UL    ABS. MONOCYTES 0.6 0.0 - 1.0 K/UL    ABS. EOSINOPHILS 0.2 0.0 - 0.4 K/UL    ABS.  BASOPHILS 0.1 0.0 - 0.1 K/UL   METABOLIC PANEL, COMPREHENSIVE    Collection Time: 07/18/17  3:28 AM   Result Value Ref Range    Sodium 135 (L) 136 - 145 mmol/L    Potassium 3.0 (L) 3.5 - 5.1 mmol/L    Chloride 102 97 - 108 mmol/L    CO2 24 21 - 32 mmol/L    Anion gap 9 5 - 15 mmol/L    Glucose 91 65 - 100 mg/dL    BUN 4 (L) 6 - 20 MG/DL    Creatinine 0.89 0.70 - 1.30 MG/DL    BUN/Creatinine ratio 4 (L) 12 - 20      GFR est AA >60 >60 ml/min/1.73m2    GFR est non-AA >60 >60 ml/min/1.73m2    Calcium 7.4 (L) 8.5 - 10.1 MG/DL    Bilirubin, total 0.7 0.2 - 1.0 MG/DL    ALT (SGPT) 16 12 - 78 U/L    AST (SGOT) 17 15 - 37 U/L    Alk. phosphatase 49 45 - 117 U/L    Protein, total 5.2 (L) 6.4 - 8.2 g/dL    Albumin 2.4 (L) 3.5 - 5.0 g/dL    Globulin 2.8 2.0 - 4.0 g/dL    A-G Ratio 0.9 (L) 1.1 - 2.2     LABRCNT(wbc:2,hgb:2,hct:2,plt:2,)  Recent Labs      07/18/17   0328  07/17/17   0407  07/16/17   0338  07/15/17   1226   NA  135*  136  138  141   K  3.0*  2.9*  3.0*  3.3*   CL  102  105  108  110*   CO2  24  22  24  23   BUN  4*  4*  5*  8   CREA  0.89  0.78  0.62*  0.70   GLU  91  92  80  92   CA  7.4*  7.3*  6.6*  6.8*   MG   --    --   2.0  2.8*     Recent Labs      07/16/17   1130   INR  1.4*   PTP  14.0*     CT ABD/PELVIS WITH AND WITHOUT CONTRAST 7/14/17:  TECHNIQUE:   Thin axial images were obtained through the abdomen and pelvis. Then, following  the uneventful intravenous administration of 100 cc Isovue-370, thin axial  images were obtained through the abdomen and pelvis in portal venous and delayed  phases. Coronal and sagittal reconstructions were generated. Oral contrast was  not administered. CT dose reduction was achieved through use of a standardized  protocol tailored for this examination and automatic exposure control for dose  modulation.     FINDINGS:   LUNG BASES: Elevated right hemidiaphragm. Right lower lobe atelectasis. INCIDENTALLY IMAGED HEART AND MEDIASTINUM: Normal heart size. Coronary artery  calcifications present. LIVER: No mass or biliary dilatation. GALLBLADDER: Unremarkable. SPLEEN: No mass. PANCREAS: No mass or ductal dilatation. ADRENALS: Unremarkable. KIDNEYS: No mass, calculus, or hydronephrosis. Bilateral renal cortical  scarring. STOMACH: Unremarkable. SMALL BOWEL: No dilatation or wall thickening. No evidence of active bleed. COLON: No dilatation or wall thickening. Large amount stool in the rectum. No  evidence of active bleed. APPENDIX: Unremarkable. PERITONEUM: No ascites or pneumoperitoneum.   RETROPERITONEUM: No lymphadenopathy or aortic aneurysm. REPRODUCTIVE ORGANS: Normal sized prostate gland. URINARY BLADDER: No mass or calculus. BONES: No destructive bone lesion. Right hip prosthesis, with large complex  joint effusion. ADDITIONAL COMMENTS: N/A     IMPRESSION  IMPRESSION:  No evidence of active GI bleed. Large amount of stool in the rectum. Large  complex right hip joint effusion could indicate hemarthrosis. Elevated right  hemidiaphragm with right lower lobe atelectasis  Impression:    Acute GI blood loss anemia  Ischemic colitis with hemorrhage  Recent Sigmoid volvulus with very dilated colon on recent colonoscopy       Plan:  No further LGI bleeding. Seems that the ischemic changes seen on colonoscopy are related to recent volvulus but improving and should f/u with surgery after discharge (Saw them last admission) and also f/u with Dr. Edita Bhatia as well to decide on possible repeat colonoscopy in 2 months. Will sign off for now.        Sharan Lance MD    7/18/2017 [address] [facility], [address] [place] [address] [place]: [phone]

## 2017-07-18 NOTE — CDMP QUERY
Dr Oskar Rodrigues:  thank you for your documentation of Severe Protein Calorie Malnutrition. please provide additional documentation for this diagnosis. clinical data: albumin 1.9-2.4, total protein 4-5  epidermis thin with loss of sq tissue,sparce hair growth noted by RN; s/p colonoscopy this past weekend that showed ischemic colitis related to colonic ulcers. Noted pt will likely need surgical consult for hemicolectomy if ischemia worsens, denies any wt loss. 07/17/17 89.4 kg (197 lb)  06/30/17 89.4 kg (197 lb 1.5 oz)  11/05/16 95.9 kg (211 lb 6.7 oz)  10/29/13 93 kg (205 lb 0.4 oz)    =>malnutrtion: mild/moderate/sever   =>Malnutrtion ruled out.  =>malnutrtion NOS  =>Other Explanation of clinical findings  =>Unable to Determine (no explanation of clinical findings)    The medical record reflects the following clinical findings, treatment, and risk factors:    Risk Factors: 78 y.o. Clinical Indicators: lower GI bleed, ABLA, hypokalemia,   Treatment: transfused as needed. registered dietitian noted and recommendations noted for possible surgery    Please clarify and document your clinical opinion in the progress notes and discharge summary including the definitive and/or presumptive diagnosis, (suspected or probable), related to the above clinical findings. Please include clinical findings supporting your diagnosis. Thank Gudelia Lazaro  [address]; MOLLY;[id number]

## 2017-07-19 VITALS
HEART RATE: 86 BPM | TEMPERATURE: 98 F | BODY MASS INDEX: 26.68 KG/M2 | HEIGHT: 72 IN | OXYGEN SATURATION: 97 % | DIASTOLIC BLOOD PRESSURE: 52 MMHG | RESPIRATION RATE: 18 BRPM | WEIGHT: 197 LBS | SYSTOLIC BLOOD PRESSURE: 101 MMHG

## 2017-07-19 LAB
ANION GAP BLD CALC-SCNC: 6 MMOL/L (ref 5–15)
BUN SERPL-MCNC: 4 MG/DL (ref 6–20)
BUN/CREAT SERPL: 5 (ref 12–20)
CALCIUM SERPL-MCNC: 6.9 MG/DL (ref 8.5–10.1)
CHLORIDE SERPL-SCNC: 104 MMOL/L (ref 97–108)
CO2 SERPL-SCNC: 26 MMOL/L (ref 21–32)
CREAT SERPL-MCNC: 0.78 MG/DL (ref 0.7–1.3)
ERYTHROCYTE [DISTWIDTH] IN BLOOD BY AUTOMATED COUNT: 17.8 % (ref 11.5–14.5)
GLUCOSE SERPL-MCNC: 96 MG/DL (ref 65–100)
HCT VFR BLD AUTO: 21.1 % (ref 36.6–50.3)
HCT VFR BLD AUTO: 25.4 % (ref 36.6–50.3)
HGB BLD-MCNC: 7 G/DL (ref 12.1–17)
HGB BLD-MCNC: 8.6 G/DL (ref 12.1–17)
MAGNESIUM SERPL-MCNC: 1.9 MG/DL (ref 1.6–2.4)
MCH RBC QN AUTO: 31 PG (ref 26–34)
MCHC RBC AUTO-ENTMCNC: 33.2 G/DL (ref 30–36.5)
MCV RBC AUTO: 93.4 FL (ref 80–99)
PHOSPHATE SERPL-MCNC: 3.3 MG/DL (ref 2.6–4.7)
PLATELET # BLD AUTO: 234 K/UL (ref 150–400)
POTASSIUM SERPL-SCNC: 3.1 MMOL/L (ref 3.5–5.1)
POTASSIUM SERPL-SCNC: 3.3 MMOL/L (ref 3.5–5.1)
RBC # BLD AUTO: 2.26 M/UL (ref 4.1–5.7)
SODIUM SERPL-SCNC: 136 MMOL/L (ref 136–145)
WBC # BLD AUTO: 5.3 K/UL (ref 4.1–11.1)

## 2017-07-19 PROCEDURE — 74011250637 HC RX REV CODE- 250/637: Performed by: INTERNAL MEDICINE

## 2017-07-19 PROCEDURE — 85027 COMPLETE CBC AUTOMATED: CPT | Performed by: INTERNAL MEDICINE

## 2017-07-19 PROCEDURE — 36415 COLL VENOUS BLD VENIPUNCTURE: CPT | Performed by: INTERNAL MEDICINE

## 2017-07-19 PROCEDURE — 94640 AIRWAY INHALATION TREATMENT: CPT

## 2017-07-19 PROCEDURE — 83735 ASSAY OF MAGNESIUM: CPT | Performed by: INTERNAL MEDICINE

## 2017-07-19 PROCEDURE — 74011250637 HC RX REV CODE- 250/637: Performed by: FAMILY MEDICINE

## 2017-07-19 PROCEDURE — 74011250637 HC RX REV CODE- 250/637: Performed by: SPECIALIST

## 2017-07-19 PROCEDURE — 77010033678 HC OXYGEN DAILY

## 2017-07-19 PROCEDURE — 80048 BASIC METABOLIC PNL TOTAL CA: CPT | Performed by: INTERNAL MEDICINE

## 2017-07-19 PROCEDURE — 3331090002 HH PPS REVENUE DEBIT

## 2017-07-19 PROCEDURE — 84132 ASSAY OF SERUM POTASSIUM: CPT | Performed by: INTERNAL MEDICINE

## 2017-07-19 PROCEDURE — 74011000250 HC RX REV CODE- 250: Performed by: SPECIALIST

## 2017-07-19 PROCEDURE — 85018 HEMOGLOBIN: CPT | Performed by: INTERNAL MEDICINE

## 2017-07-19 PROCEDURE — 3331090001 HH PPS REVENUE CREDIT

## 2017-07-19 PROCEDURE — 84100 ASSAY OF PHOSPHORUS: CPT | Performed by: INTERNAL MEDICINE

## 2017-07-19 RX ORDER — POTASSIUM CHLORIDE 20 MEQ/1
40 TABLET, EXTENDED RELEASE ORAL
Status: DISCONTINUED | OUTPATIENT
Start: 2017-07-19 | End: 2017-07-19

## 2017-07-19 RX ORDER — POTASSIUM CHLORIDE 1.5 G/1.77G
20 POWDER, FOR SOLUTION ORAL
Status: COMPLETED | OUTPATIENT
Start: 2017-07-19 | End: 2017-07-19

## 2017-07-19 RX ORDER — BENZONATATE 100 MG/1
100 CAPSULE ORAL
Qty: 15 CAP | Refills: 0 | Status: SHIPPED | OUTPATIENT
Start: 2017-07-19 | End: 2017-07-26

## 2017-07-19 RX ORDER — POTASSIUM CHLORIDE 1.5 G/1.77G
40 POWDER, FOR SOLUTION ORAL
Status: COMPLETED | OUTPATIENT
Start: 2017-07-19 | End: 2017-07-19

## 2017-07-19 RX ORDER — POTASSIUM CHLORIDE 750 MG/1
20 TABLET, FILM COATED, EXTENDED RELEASE ORAL
Status: DISCONTINUED | OUTPATIENT
Start: 2017-07-19 | End: 2017-07-19

## 2017-07-19 RX ORDER — IPRATROPIUM BROMIDE AND ALBUTEROL SULFATE 2.5; .5 MG/3ML; MG/3ML
3 SOLUTION RESPIRATORY (INHALATION)
Status: DISCONTINUED | OUTPATIENT
Start: 2017-07-19 | End: 2017-07-19 | Stop reason: HOSPADM

## 2017-07-19 RX ORDER — GUAIFENESIN/DEXTROMETHORPHAN 100-10MG/5
10 SYRUP ORAL
Qty: 1 BOTTLE | Refills: 0 | Status: SHIPPED | OUTPATIENT
Start: 2017-07-19 | End: 2017-07-29

## 2017-07-19 RX ORDER — PANTOPRAZOLE SODIUM 40 MG/1
40 TABLET, DELAYED RELEASE ORAL
Qty: 30 TAB | Refills: 1 | Status: SHIPPED | OUTPATIENT
Start: 2017-07-19

## 2017-07-19 RX ADMIN — FINASTERIDE 2.5 MG: 5 TABLET, FILM COATED ORAL at 08:28

## 2017-07-19 RX ADMIN — IPRATROPIUM BROMIDE AND ALBUTEROL SULFATE 3 ML: .5; 3 SOLUTION RESPIRATORY (INHALATION) at 07:33

## 2017-07-19 RX ADMIN — AMIODARONE HYDROCHLORIDE 200 MG: 200 TABLET ORAL at 08:27

## 2017-07-19 RX ADMIN — TAMSULOSIN HYDROCHLORIDE 0.4 MG: 0.4 CAPSULE ORAL at 08:28

## 2017-07-19 RX ADMIN — POTASSIUM CHLORIDE 40 MEQ: 1.5 POWDER, FOR SOLUTION ORAL at 08:55

## 2017-07-19 RX ADMIN — LORAZEPAM 1 MG: 1 TABLET ORAL at 05:23

## 2017-07-19 RX ADMIN — PANTOPRAZOLE SODIUM 40 MG: 40 TABLET, DELAYED RELEASE ORAL at 08:27

## 2017-07-19 RX ADMIN — POTASSIUM CHLORIDE 20 MEQ: 1.5 POWDER, FOR SOLUTION ORAL at 12:40

## 2017-07-19 RX ADMIN — Medication 10 ML: at 05:13

## 2017-07-19 NOTE — PROGRESS NOTES
I have reviewed discharge instructions with the patient and spouse. The patient and spouse verbalized understanding. Given prescriptions and removed PIV. Awaiting volunteer for wheelchair to door.

## 2017-07-19 NOTE — PROGRESS NOTES
Hospitalist Progress Note    NAME: Anna Real   :  1940   MRN:  [id number]       Assessment / Plan:  GI Bleeding: Diverticular Bleed ruled out; 7/15 Colonoscopy showed Ischemic Colitis and Colonic Ulceration (had Volvulus last admission)  -Hgb dropped to 7.0, will repeat H&H. No bleeding repported  - transferred out of ICU on  and Femoral CVC removed  -diet advanced, tolerated well    Acute Blood Loss Anemia  -s/p 5 units of blood, Hgb down from 8.8 to 7.0, continue to monitor daily unless indicated more frequently    Incidental note of Right hip Effusion on CT A/P from   -Appreciate orthopedic Surgery consult, will need OP follow-up     Atrial Fibrillation  -hold ASA, PO Amiodarone restarted  Holding metoprolol given low BP, was orthostatic with standing , repeat orthostatics today    Hypokalemia: persists  -replete potassium today and monitor    Mild Hypocalcemia: improved after repletion    Hypertension  -hold home Metoprolol at this time as BP borderline low, pt denied any orthostatic sx, monitor, was orthostatic on standing with PT    Discharged on  with PNA and respiratory failure (coarse breath sound noted at this time per review of discharge summary physical exam), no TTE available in system  -CXR on : \"Comparison to 2017. Portable exam obtained at 6943 demonstrates little change in the elevation of the right hemidiaphragm with right basilar  atelectasis compared to prior examination. IMPRESSION  Impression: No change compared to the prior exam.\"  Will need repeat CXR in 2-3 weeks post discharge    Severe Protein Calorie Malnutrition: albunim 1.9  -nutrition consult     Body mass index is 26.72 kg/(m^2).     Code status: Full  Prophylaxis: SCD's  Recommended Disposition: TBD     Subjective:     Chief Complaint / Reason for Physician Visit: follow-up GIB/Anemia  Patient feels good,  no pain or recurrent bleeding  Denies right hip pain, no acute events overnight per nursing    Review of Systems:  Symptom Y/N Comments  Symptom Y/N Comments   Fever/Chills n   Chest Pain n    Poor Appetite    Edema y \"same as always\"   Cough n   Abdominal Pain n    Sputum    Joint Pain     SOB/ANDERSON n   Pruritis/Rash     Nausea/vomit    Tolerating PT/OT     Diarrhea n   Tolerating Diet     Constipation n   Other       Could NOT obtain due to:      Objective:     VITALS:   Last 24hrs VS reviewed since prior progress note. Most recent are:  Patient Vitals for the past 24 hrs:   Temp Pulse Resp BP SpO2   07/19/17 0734 - - - - 96 %   07/19/17 0730 97.8 °F (36.6 °C) 85 20 134/75 95 %   07/18/17 2247 98.1 °F (36.7 °C) 85 18 121/49 99 %   07/18/17 1931 - - - - 99 %   07/18/17 1855 97.4 °F (36.3 °C) 93 18 103/47 98 %   07/18/17 1501 - - - - 98 %   07/18/17 1449 98.6 °F (37 °C) 83 16 135/61 100 %   07/18/17 1110 98.8 °F (37.1 °C) 90 18 118/55 96 %   07/18/17 0959 - (!) 103 - 122/53 96 %   07/18/17 0957 - - - (!) 87/42 -   07/18/17 0956 - 96 - (!) 84/49 -   07/18/17 0952 - 89 - 103/43 99 %       Intake/Output Summary (Last 24 hours) at 07/19/17 0930  Last data filed at 07/18/17 1838   Gross per 24 hour   Intake              580 ml   Output              240 ml   Net              340 ml        PHYSICAL EXAM:  General: WD, WN. Alert, cooperative, no acute distress    EENT:  EOMI. Anicteric sclerae. MM dry  Resp:  CTA BL no wheezing or rhonchi. No accessory muscle use  CV:  Regular  rhythm,  Trace BLE edema  GI:  Soft, Non distended, Non tender. Hypoactive Bowel sounds  Neurologic:  Alert and oriented X 3, normal speech,   Psych:   Good insight. Not anxious nor agitated  Skin:  No rashes.   No jaundice    Reviewed most current lab test results and cultures  YES  Reviewed most current radiology test results   YES  Review and summation of old records today    NO  Reviewed patient's current orders and MAR    YES  PMH/SH reviewed - no change compared to H&P  ________________________________________________________________________  Care Plan discussed with:    Comments   Patient x    Family      RN x    Care Manager x    Consultant                        Multidiciplinary team rounds were held today with , nursing, pharmacist and clinical coordinator. Patient's plan of care was discussed; medications were reviewed and discharge planning was addressed. ________________________________________________________________________  Total NON critical care TIME:  25   Minutes    Total CRITICAL CARE TIME Spent:   Minutes non procedure based      Comments   >50% of visit spent in counseling and coordination of care     ________________________________________________________________________  Андрей Dumont MD     Procedures: see electronic medical records for all procedures/Xrays and details which were not copied into this note but were reviewed prior to creation of Plan. LABS:  I reviewed today's most current labs and imaging studies.   Pertinent labs include:  Recent Labs      07/19/17 0826 07/19/17 0220 07/18/17 0327 07/17/17   0407   WBC   --   5.3  8.3  8.1   HGB  8.6*  7.0*  8.8*  8.1*   HCT  25.4*  21.1*  25.2*  23.4*   PLT   --   234  278  254     Recent Labs      07/19/17 0220 07/18/17 0328  07/17/17   0407  07/16/17   1130   NA  136  135*  136   --    K  3.1*  3.0*  2.9*   --    CL  104  102  105   --    CO2  26  24  22   --    GLU  96  91  92   --    BUN  4*  4*  4*   --    CREA  0.78  0.89  0.78   --    CA  6.9*  7.4*  7.3*   --    MG  1.9   --    --    --    PHOS  3.3   --    --    --    ALB   --   2.4*   --    --    TBILI   --   0.7   --    --    SGOT   --   17   --    --    ALT   --   16   --    --    INR   --    --    --   1.4*       Signed: Андрей Dumont MD

## 2017-07-19 NOTE — PROGRESS NOTES
End of Shift Nursing Note    Bedside shift change report given to [address] [place] [address] (oncoming nurse) . Report included the following information SBAR, Kardex, Intake/Output, MAR and Recent Results. Zone Phone:   [phone]    Significant changes during shift:    hgb down to 7.0   K+3.1   Non-emergent issues for physician to address:       Number times ambulated in hallway past shift: 0      Number of times OOB to chair past shift: x1    POD #:      Vital Signs:    Temp: 98.1 °F (36.7 °C)     Pulse (Heart Rate): 85     BP: 121/49     Resp Rate: 18     O2 Sat (%): 99 %    Lines & Drains:     Urinary Catheter? No   Placement Date:    Medical Necessity:   Central Line? No   Placement Date:    Medical Necessity:   PICC Line? No   Placement Date:    Medical Necessity:     NG tube [] in [] removed [x] not applicable   Drains [] in [] removed [x] not applicable     Skin Integrity:      Wounds: no   Dressings Present: no    Wound Concerns:       GI:    Current diet:  DIET CARDIAC Regular    Nausea: NO  Vomiting: NO  Bowel Sounds: YES  Flatus: YES  Last Bowel Movement: yesterday   Appearance:     Respiratory:  Supplemental O2: Yes      Device: nasal cannula   via 3 Liters/min     Incentive Spirometer: YES  Volume:   Coughing and Deep Breathing: YES  Oral Care: YES  Understanding (patient/family education): YES   Getting out of bed: YES  Head of bed elevation: YES    Patient Safety:    Falls Score: 2  Mobility Score: 4  Bed Alarm On? No  Sitter? No      Opportunity for questions and clarification was given to oncoming nurse. Patient bed is in low position, side rails are up x 2, door & observation blinds open as needed, call bell within reach and patient not in distress.     Davina Barreto RN

## 2017-07-19 NOTE — DISCHARGE INSTRUCTIONS
HOSPITALIST DISCHARGE INSTRUCTIONS    NAME: Laura Becerril   :  1940   MRN:  [id number]     Date/Time:  2017 11:45 AM    ADMIT DATE: 2017   DISCHARGE DATE: 2017         · It is important that you take the medication exactly as they are prescribed. · Keep your medication in the bottles provided by the pharmacist and keep a list of the medication names, dosages, and times to be taken in your wallet. · Do not take other medications without consulting your doctor. What to do at Home    Recommended diet:  Cardiac Diet and Low fat, Low cholesterol    Recommended activity: Activity as tolerated  Check BP twice daily, keep log, notift PCP if BP more then 150/90 or less then 100/60 or symptoms  Discuss with PCP restarting proscar stopped in hospital for low BP on standing  Discuss with PCP when to resume aspirin      If you have questions regarding the hospital related prescriptions or hospital related issues please call [facility] at . You can always direct your questions to your primary care doctor if you are unable to reach your hospital physician; your PCP works as an extension of your hospital doctor just like your hospital doctor is an extension of your PCP for your time at the hospital [facility])    If you experience any of the following symptoms then please call your primary care physician or return to the emergency room if you cannot get hold of your doctor:    Fever, chills, nausea, vomiting, or persistent diarrhea  Worsening weakness or new problems with your speech or balance  Dark stools or visible blood in your stools  New Leg swelling or shortness of breath as this could be signs of a clot    Additional Instructions:      Bring these papers with you to your follow up appointments.  The papers will help your doctors be sure to continue the care plan from the hospital.              Information obtained by :  I understand that if any problems occur once I am at home I am to contact my physician. I understand and acknowledge receipt of the instructions indicated above.                                                                                                                                            Physician's or R.N.'s Signature                                                                  Date/Time                                                                                                                                              Patient or Representative Signature

## 2017-07-19 NOTE — PROGRESS NOTES
Gastroenterology Daily Progress Note (Dr. Harris Turner)    Admit Date: 7/14/2017       Subjective:       Patient had a normal small BM. Asking to go home. Current Facility-Administered Medications   Medication Dose Route Frequency    potassium chloride (K-DUR, KLOR-CON) SR tablet 40 mEq  40 mEq Oral NOW    albuterol-ipratropium (DUO-NEB) 2.5 MG-0.5 MG/3 ML  3 mL Nebulization Q4H PRN    finasteride (PROSCAR) tablet 2.5 mg  2.5 mg Oral DAILY    polyethylene glycol (MIRALAX) packet 17 g  17 g Oral BID    amiodarone (CORDARONE) tablet 200 mg  200 mg Oral Q12H    benzonatate (TESSALON) capsule 100 mg  100 mg Oral Q8H PRN    guaiFENesin-dextromethorphan (ROBITUSSIN DM) 100-10 mg/5 mL syrup 10 mL  10 mL Oral Q6H PRN    tamsulosin (FLOMAX) capsule 0.4 mg  0.4 mg Oral DAILY    simethicone (MYLICON) 48GK/2.4LY oral drops 80 mg  1.2 mL Oral Multiple    fentaNYL citrate (PF) injection  mcg   mcg IntraVENous Multiple    midazolam (VERSED) injection 1-10 mg  1-10 mg IntraVENous Multiple    pantoprazole (PROTONIX) tablet 40 mg  40 mg Oral ACB    0.9% sodium chloride infusion 250 mL  250 mL IntraVENous PRN    simethicone (MYLICON) 25VV/0.7RV oral drops 80 mg  1.2 mL Oral Multiple    sodium chloride (NS) flush 5-10 mL  5-10 mL IntraVENous Q8H    sodium chloride (NS) flush 5-10 mL  5-10 mL IntraVENous PRN    ondansetron (ZOFRAN) injection 4 mg  4 mg IntraVENous Q4H PRN    mupirocin (BACTROBAN) 2 % ointment   Both Nostrils BID    LORazepam (ATIVAN) tablet 1 mg  1 mg Oral Q6H PRN        Objective:     Visit Vitals    /75    Pulse 85    Temp 97.8 °F (36.6 °C)    Resp 20    Ht 6' (1.829 m)    Wt 89.4 kg (197 lb)    SpO2 96%    BMI 26.72 kg/m2   Blood pressure 134/75, pulse 85, temperature 97.8 °F (36.6 °C), resp.  rate 20, height 6' (1.829 m), weight 89.4 kg (197 lb), SpO2 96 %.         07/17 1901 - 07/19 0700  In: 700 [P.O.:700]  Out: 240 [Urine:240]      Intake/Output Summary (Last 24 hours) at 07/19/17 0816  Last data filed at 07/18/17 1838   Gross per 24 hour   Intake              580 ml   Output              240 ml   Net              340 ml     Physical Exam:     General: awake, elderly, pale WM in NAD  Chest:  CTA, No rhonchi, rales or rubs. Heart: S1, S2, RRR  GI: Soft, nontender to palpation, nondistended, + BS    Labs:       Recent Results (from the past 24 hour(s))   CBC W/O DIFF    Collection Time: 07/19/17  2:20 AM   Result Value Ref Range    WBC 5.3 4.1 - 11.1 K/uL    RBC 2.26 (L) 4.10 - 5.70 M/uL    HGB 7.0 (L) 12.1 - 17.0 g/dL    HCT 21.1 (L) 36.6 - 50.3 %    MCV 93.4 80.0 - 99.0 FL    MCH 31.0 26.0 - 34.0 PG    MCHC 33.2 30.0 - 36.5 g/dL    RDW 17.8 (H) 11.5 - 14.5 %    PLATELET 359 314 - 390 K/uL   METABOLIC PANEL, BASIC    Collection Time: 07/19/17  2:20 AM   Result Value Ref Range    Sodium 136 136 - 145 mmol/L    Potassium 3.1 (L) 3.5 - 5.1 mmol/L    Chloride 104 97 - 108 mmol/L    CO2 26 21 - 32 mmol/L    Anion gap 6 5 - 15 mmol/L    Glucose 96 65 - 100 mg/dL    BUN 4 (L) 6 - 20 MG/DL    Creatinine 0.78 0.70 - 1.30 MG/DL    BUN/Creatinine ratio 5 (L) 12 - 20      GFR est AA >60 >60 ml/min/1.73m2    GFR est non-AA >60 >60 ml/min/1.73m2    Calcium 6.9 (L) 8.5 - 10.1 MG/DL   PHOSPHORUS    Collection Time: 07/19/17  2:20 AM   Result Value Ref Range    Phosphorus 3.3 2.6 - 4.7 MG/DL   MAGNESIUM    Collection Time: 07/19/17  2:20 AM   Result Value Ref Range    Magnesium 1.9 1.6 - 2.4 mg/dL     Recent Labs      07/19/17   0220  07/18/17   0328  07/17/17   0407   NA  136  135*  136   K  3.1*  3.0*  2.9*   CL  104  102  105   CO2  26  24  22   BUN  4*  4*  4*   CREA  0.78  0.89  0.78   GLU  96  91  92   CA  6.9*  7.4*  7.3*   MG  1.9   --    --    PHOS  3.3   --    --      Recent Labs      07/16/17   1130   INR  1.4*   PTP  14.0*     CT ABD/PELVIS WITH AND WITHOUT CONTRAST 7/14/17:  TECHNIQUE:   Thin axial images were obtained through the abdomen and pelvis.  Then, following  the uneventful intravenous administration of 100 cc Isovue-370, thin axial  images were obtained through the abdomen and pelvis in portal venous and delayed  phases. Coronal and sagittal reconstructions were generated. Oral contrast was  not administered. CT dose reduction was achieved through use of a standardized  protocol tailored for this examination and automatic exposure control for dose  modulation.     FINDINGS:   LUNG BASES: Elevated right hemidiaphragm. Right lower lobe atelectasis. INCIDENTALLY IMAGED HEART AND MEDIASTINUM: Normal heart size. Coronary artery  calcifications present. LIVER: No mass or biliary dilatation. GALLBLADDER: Unremarkable. SPLEEN: No mass. PANCREAS: No mass or ductal dilatation. ADRENALS: Unremarkable. KIDNEYS: No mass, calculus, or hydronephrosis. Bilateral renal cortical  scarring. STOMACH: Unremarkable. SMALL BOWEL: No dilatation or wall thickening. No evidence of active bleed. COLON: No dilatation or wall thickening. Large amount stool in the rectum. No  evidence of active bleed. APPENDIX: Unremarkable. PERITONEUM: No ascites or pneumoperitoneum. RETROPERITONEUM: No lymphadenopathy or aortic aneurysm. REPRODUCTIVE ORGANS: Normal sized prostate gland. URINARY BLADDER: No mass or calculus. BONES: No destructive bone lesion. Right hip prosthesis, with large complex  joint effusion. ADDITIONAL COMMENTS: N/A     IMPRESSION:  No evidence of active GI bleed. Large amount of stool in the rectum. Large  complex right hip joint effusion could indicate hemarthrosis. Elevated right  hemidiaphragm with right lower lobe atelectasis  Impression:    Acute GI blood loss anemia  Ischemic colitis with hemorrhage  Recent Sigmoid volvulus with very dilated colon on recent colonoscopy       Plan:  Patient with Hgb 7.0 this am but his stool is reportedly normal in color without any overt blood or dark stool.   Given his recent orthostasis I think it wise to repeat H/H this am and if it's higher I am comfortable with discharge and then f/u with dr. Romi Eugene and surgery.   Plan outlined with pt and hospital MD.       Janene Martins MD    7/19/2017  [address], [address] [place] [address]  [address] [id number]  [address] [place] [address] [place]: [phone]

## 2017-07-19 NOTE — DISCHARGE SUMMARY
Hospitalist Discharge Summary     Patient ID:  Kaylan Garza  [id number]  32 y.o.  1940    PCP on record: Gray Kahn MD    Admit date: 7/14/2017  Discharge date and time: 7/19/2017      DISCHARGE DIAGNOSIS:  GI Bleeding: Diverticular Bleed ruled out; 7/15 Colonoscopy showed Ischemic Colitis and Colonic Ulceration (had Volvulus last admission)  -Hgb stable at 8.6. No bleeding reported  - transferred out of ICU on 7/16 and Femoral CVC removed  -diet advanced, tolerated well     Acute Blood Loss Anemia  -s/p 5 units of blood, Hgb stable     Incidental note of Right hip Effusion on CT A/P from 7/14  -Appreciate orthopedic Surgery consult, will need OP follow-up      Atrial Fibrillation  -hold ASA, PO Amiodarone restarted  Holding metoprolol given low BP, orthostasis better, asymptomatic on standing ambulating hallway with no difficulty. Stop proscar, OP follow-up with PCP to discuss when able to resume Proscar     Hypokalemia: persists  -replete potassium , resume home dose potassium     Mild Hypocalcemia: improved after repletion     Hypertension  -hold home Metoprolol at this time as BP borderline low, pt denied any orthostatic sx, monitor, orthostatics are better, clinically pt stable on ambulating [address] with no sx     Discharged on 7/8 with PNA and respiratory failure (coarse breath sound noted at this time per review of discharge summary physical exam), no TTE available in system  -CXR on 7/16: \"Comparison to 7/14/2017. Portable exam obtained at 4841 demonstrates little change in the elevation of the right hemidiaphragm with right basilar  atelectasis compared to prior examination.   IMPRESSION  Impression: No change compared to the prior exam.\"  Will need repeat CXR in 2-3 weeks post discharge     Severe Protein Calorie Malnutrition: albunim 1.9  -nutrition consult      Body mass index is 26.72 kg/(m^2).     Code status: Full  Prophylaxis: SCD's  Recommended Disposition: TBD      CONSULTATIONS:  IP CONSULT TO GASTROENTEROLOGY  IP CONSULT TO ORTHOPEDIC SURGERY    Excerpted HPI from H&P of Isha Sherman MD:  As above    ______________________________________________________________________  DISCHARGE SUMMARY/HOSPITAL COURSE:  for full details see H&P, daily progress notes, labs, consult notes. _______________________________________________________________________  Patient seen and examined by me on discharge day. Pertinent Findings:  Gen:    Not in distress  Chest: Clear lungs  CVS:   Regular rhythm. No edema  Abd:  Soft, not distended, not tender  Neuro:  Alert, awake, oriented, grossly intact  _______________________________________________________________________  DISCHARGE MEDICATIONS:   Current Discharge Medication List      START taking these medications    Details   pantoprazole (PROTONIX) 40 mg tablet Take 1 Tab by mouth Daily (before breakfast). Qty: 30 Tab, Refills: 1      !! OTHER CBC  BMP fax results to PCP  Qty: 1 Act, Refills: 0      !! OTHER CXR in 3 weeks  Follow-up [place]  Send results to PCP  Qty: 1 Act, Refills: 0       !! - Potential duplicate medications found. Please discuss with provider. CONTINUE these medications which have CHANGED    Details   benzonatate (TESSALON) 100 mg capsule Take 1 Cap by mouth every eight (8) hours as needed for Cough for up to 7 days. Qty: 15 Cap, Refills: 0      guaiFENesin-dextromethorphan (ROBITUSSIN DM) 100-10 mg/5 mL syrup Take 10 mL by mouth every six (6) hours as needed for up to 10 days. Qty: 1 Bottle, Refills: 0         CONTINUE these medications which have NOT CHANGED    Details   OXYGEN-AIR DELIVERY SYSTEMS 2 L by Nasal route as needed (sob). concentrator and portable tanks in the home      acetaminophen (TYLENOL) 500 mg tablet Take 500 mg by mouth every six (6) hours as needed. polyethylene glycol (MIRALAX) 17 gram packet Take 1 Packet by mouth daily.   Qty: 30 Packet, Refills: 0 linaclotide (LINZESS) 145 mcg cap capsule Take 1 Cap by mouth Daily (before breakfast). Qty: 30 Cap, Refills: 0      amiodarone (CORDARONE) 200 mg tablet Take 1 Tab by mouth two (2) times a day for 14 days. Then, starting on 7/23, decrease dose to 200 mg by mouth daily in the morning for 14 more days. (28 days total therapy)  Qty: 42 Tab, Refills: 0      HYDROcodone-acetaminophen (NORCO) 5-325 mg per tablet Take 1 Tab by mouth every six (6) hours as needed for Pain. Max Daily Amount: 4 Tabs. Qty: 10 Tab, Refills: 0      LORazepam (ATIVAN) 1 mg tablet Take  by mouth every four (4) hours as needed. tamsulosin (FLOMAX) 0.4 mg capsule Take 0.4 mg by mouth daily. STOP taking these medications       bumetanide (BUMEX) 1 mg tablet Comments:   Reason for Stopping:         potassium chloride SR (K-TAB) 20 mEq tablet Comments:   Reason for Stopping:         cephALEXin (KEFLEX) 500 mg capsule Comments:   Reason for Stopping:         naproxen (NAPROSYN) 250 mg tablet Comments:   Reason for Stopping:         doxycycline (VIBRA-TABS) 100 mg tablet Comments:   Reason for Stopping:         l.acidoph-B.lactis-B.longum (FLORAJEN3) 460 mg (7.5-6- 1.5 bill. cell) cap cap Comments:   Reason for Stopping:         metoprolol succinate (TOPROL-XL) 50 mg XL tablet Comments:   Reason for Stopping:         aspirin 81 mg chewable tablet Comments:   Reason for Stopping:         pregabalin (LYRICA) 100 mg capsule Comments:   Reason for Stopping:         omeprazole (PRILOSEC) 20 mg capsule Comments:   Reason for Stopping:         finasteride (PROSCAR) 5 mg tablet Comments:   Reason for Stopping:               My Recommended Diet, Activity, Wound Care, and follow-up labs are listed in the patient's Discharge Insturctions which I have personally completed and reviewed.     _______________________________________________________________________  DISPOSITION:    Home with Family:    Home with HH/PT/OT/RN: x   SNF/LTC:    [place]: OTHER:        Condition at Discharge:  Stable  _______________________________________________________________________  Follow up with:   PCP : Clint Nicholson MD  Follow-up Information     Follow up With Details Comments [facility]  This is your home health provider  [address]  [address]  [phone]    Ashley Friday, MD In 1 week follow up CBC, BMP and CXR [address]  [address] [address] [phone]      Livan Elias MD In 2 weeks follow up [address] [facility]  [address] [facility]  [address] [facility]      Ronak Cruz MD In 3 weeks  [place] [address]  [phone]      [address] [place] [address] In 3 weeks follow-up Joseph adams [address] [facility]    Blanca Elizabeth MD In 3 weeks  [address] [facility]  MOB 3 [address] [facility]  [address] [facility]                Total time in minutes spent coordinating this discharge (includes going over instructions, follow-up, prescriptions, and preparing report for sign off to her PCP) :  55 minutes    Signed:  Martine Melendrez MD

## 2017-07-19 NOTE — PROGRESS NOTES
[facility] AEROSOL PROTOCAL - ASSESS    Patient: Leland Downing, 7/19/2017  7:37 AM    Breath Sounds:  RUL: Clear  RML: Clear  RLL: Clear  OLYA: Clear  LLL: Clear    Breathing Pattern:  Regular    Respiratory Rate: 16    Cough:  Nonproductive,  Strong,  Dry    Heart Rate:  Pre:  74, Post:  78    Repiratory Rate:  Pre:  16    Oxygen: Room air  SPO2: Without oxygen:  96%  Nebulizer Therapy:    Current:  Albuterol  Every 6 Hours Scheduled and Atrovent  Every 6 Hours Scheduled    Changed:  Yes, changed to:  Albuterol  Every 4 Hours PRN and Atrovent  Every 4 Hours PRN    Smoking history: no history of tobacco aduse    Problem List:    Patient Active Problem List   Diagnosis Code    [facility] J96.90    Sepsis due to pneumonia ([facility] J18.9, A41.9    E-coli UTI N39.0, B96.20    Sigmoid volvulus ([facility] K56.2    A-fib ([facility] I48.91    Electrolyte abnormality E87.8    Hypertension I10    GI bleed K92.2       Respiratory Therapist: Radha Richard, RT

## 2017-07-20 LAB — COBALT SERPL-MCNC: 14.2 UG/L (ref 0–0.9)

## 2017-07-20 PROCEDURE — 3331090002 HH PPS REVENUE DEBIT

## 2017-07-20 PROCEDURE — 3331090001 HH PPS REVENUE CREDIT

## 2017-07-21 ENCOUNTER — HOME CARE VISIT (OUTPATIENT)
Dept: SCHEDULING | Facility: HOME HEALTH | Age: 77
End: 2017-07-21
Payer: MEDICARE

## 2017-07-21 VITALS
RESPIRATION RATE: 16 BRPM | DIASTOLIC BLOOD PRESSURE: 70 MMHG | WEIGHT: 207 LBS | OXYGEN SATURATION: 94 % | HEART RATE: 83 BPM | BODY MASS INDEX: 28.07 KG/M2 | TEMPERATURE: 97.5 F | SYSTOLIC BLOOD PRESSURE: 128 MMHG

## 2017-07-21 PROCEDURE — 3331090001 HH PPS REVENUE CREDIT

## 2017-07-21 PROCEDURE — G0151 HHCP-SERV OF PT,EA 15 MIN: HCPCS

## 2017-07-21 PROCEDURE — 3331090002 HH PPS REVENUE DEBIT

## 2017-07-22 ENCOUNTER — HOME CARE VISIT (OUTPATIENT)
Dept: SCHEDULING | Facility: HOME HEALTH | Age: 77
End: 2017-07-22
Payer: MEDICARE

## 2017-07-22 VITALS
WEIGHT: 202 LBS | HEART RATE: 76 BPM | DIASTOLIC BLOOD PRESSURE: 60 MMHG | SYSTOLIC BLOOD PRESSURE: 132 MMHG | OXYGEN SATURATION: 98 % | RESPIRATION RATE: 18 BRPM | TEMPERATURE: 98.5 F | BODY MASS INDEX: 27.4 KG/M2

## 2017-07-22 PROCEDURE — 3331090002 HH PPS REVENUE DEBIT

## 2017-07-22 PROCEDURE — G0299 HHS/HOSPICE OF RN EA 15 MIN: HCPCS

## 2017-07-22 PROCEDURE — 3331090001 HH PPS REVENUE CREDIT

## 2017-07-23 PROCEDURE — 3331090001 HH PPS REVENUE CREDIT

## 2017-07-23 PROCEDURE — 3331090002 HH PPS REVENUE DEBIT

## 2017-07-24 ENCOUNTER — HOME CARE VISIT (OUTPATIENT)
Dept: HOME HEALTH SERVICES | Facility: HOME HEALTH | Age: 77
End: 2017-07-24
Payer: MEDICARE

## 2017-07-24 LAB — CR SERPL-MCNC: 4.3 UG/L (ref 0.1–2.1)

## 2017-07-24 PROCEDURE — 3331090001 HH PPS REVENUE CREDIT

## 2017-07-24 PROCEDURE — 3331090002 HH PPS REVENUE DEBIT

## 2017-07-25 ENCOUNTER — HOME CARE VISIT (OUTPATIENT)
Dept: SCHEDULING | Facility: HOME HEALTH | Age: 77
End: 2017-07-25
Payer: MEDICARE

## 2017-07-25 ENCOUNTER — HOME CARE VISIT (OUTPATIENT)
Dept: HOME HEALTH SERVICES | Facility: HOME HEALTH | Age: 77
End: 2017-07-25
Payer: MEDICARE

## 2017-07-25 VITALS
BODY MASS INDEX: 26.45 KG/M2 | DIASTOLIC BLOOD PRESSURE: 60 MMHG | HEART RATE: 76 BPM | TEMPERATURE: 97.6 F | OXYGEN SATURATION: 94 % | WEIGHT: 195 LBS | SYSTOLIC BLOOD PRESSURE: 150 MMHG

## 2017-07-25 PROCEDURE — 3331090001 HH PPS REVENUE CREDIT

## 2017-07-25 PROCEDURE — 3331090002 HH PPS REVENUE DEBIT

## 2017-07-25 PROCEDURE — G0152 HHCP-SERV OF OT,EA 15 MIN: HCPCS

## 2017-07-26 ENCOUNTER — HOME CARE VISIT (OUTPATIENT)
Dept: SCHEDULING | Facility: HOME HEALTH | Age: 77
End: 2017-07-26
Payer: MEDICARE

## 2017-07-26 PROCEDURE — 3331090001 HH PPS REVENUE CREDIT

## 2017-07-26 PROCEDURE — G0151 HHCP-SERV OF PT,EA 15 MIN: HCPCS

## 2017-07-26 PROCEDURE — 3331090002 HH PPS REVENUE DEBIT

## 2017-07-27 ENCOUNTER — HOME CARE VISIT (OUTPATIENT)
Dept: HOME HEALTH SERVICES | Facility: HOME HEALTH | Age: 77
End: 2017-07-27
Payer: MEDICARE

## 2017-07-27 VITALS
TEMPERATURE: 98.2 F | SYSTOLIC BLOOD PRESSURE: 118 MMHG | BODY MASS INDEX: 26.45 KG/M2 | OXYGEN SATURATION: 95 % | DIASTOLIC BLOOD PRESSURE: 58 MMHG | HEART RATE: 75 BPM | WEIGHT: 195 LBS

## 2017-07-27 PROCEDURE — 3331090001 HH PPS REVENUE CREDIT

## 2017-07-27 PROCEDURE — 3331090002 HH PPS REVENUE DEBIT

## 2017-07-28 PROCEDURE — 3331090002 HH PPS REVENUE DEBIT

## 2017-07-28 PROCEDURE — 3331090001 HH PPS REVENUE CREDIT

## 2017-07-29 PROCEDURE — 3331090001 HH PPS REVENUE CREDIT

## 2017-07-29 PROCEDURE — 3331090002 HH PPS REVENUE DEBIT

## 2017-07-30 ENCOUNTER — HOME CARE VISIT (OUTPATIENT)
Dept: HOME HEALTH SERVICES | Facility: HOME HEALTH | Age: 77
End: 2017-07-30
Payer: MEDICARE

## 2017-07-30 PROCEDURE — 3331090002 HH PPS REVENUE DEBIT

## 2017-07-30 PROCEDURE — 3331090001 HH PPS REVENUE CREDIT

## 2017-07-31 ENCOUNTER — HOME CARE VISIT (OUTPATIENT)
Dept: HOME HEALTH SERVICES | Facility: HOME HEALTH | Age: 77
End: 2017-07-31
Payer: MEDICARE

## 2017-07-31 PROCEDURE — 3331090001 HH PPS REVENUE CREDIT

## 2017-07-31 PROCEDURE — 3331090002 HH PPS REVENUE DEBIT

## 2017-08-01 ENCOUNTER — HOME CARE VISIT (OUTPATIENT)
Dept: SCHEDULING | Facility: HOME HEALTH | Age: 77
End: 2017-08-01
Payer: MEDICARE

## 2017-08-01 VITALS
TEMPERATURE: 98 F | OXYGEN SATURATION: 98 % | SYSTOLIC BLOOD PRESSURE: 105 MMHG | HEART RATE: 88 BPM | DIASTOLIC BLOOD PRESSURE: 60 MMHG

## 2017-08-01 PROCEDURE — 3331090002 HH PPS REVENUE DEBIT

## 2017-08-01 PROCEDURE — 3331090001 HH PPS REVENUE CREDIT

## 2017-08-01 PROCEDURE — G0152 HHCP-SERV OF OT,EA 15 MIN: HCPCS

## 2017-08-02 ENCOUNTER — HOME CARE VISIT (OUTPATIENT)
Dept: SCHEDULING | Facility: HOME HEALTH | Age: 77
End: 2017-08-02
Payer: MEDICARE

## 2017-08-02 PROCEDURE — 3331090002 HH PPS REVENUE DEBIT

## 2017-08-02 PROCEDURE — G0300 HHS/HOSPICE OF LPN EA 15 MIN: HCPCS

## 2017-08-02 PROCEDURE — 3331090001 HH PPS REVENUE CREDIT

## 2017-08-03 ENCOUNTER — HOME CARE VISIT (OUTPATIENT)
Dept: SCHEDULING | Facility: HOME HEALTH | Age: 77
End: 2017-08-03
Payer: MEDICARE

## 2017-08-03 VITALS
RESPIRATION RATE: 17 BRPM | SYSTOLIC BLOOD PRESSURE: 110 MMHG | BODY MASS INDEX: 25.16 KG/M2 | HEART RATE: 90 BPM | WEIGHT: 185.5 LBS | OXYGEN SATURATION: 97 % | TEMPERATURE: 98.3 F | DIASTOLIC BLOOD PRESSURE: 80 MMHG

## 2017-08-03 PROCEDURE — G0151 HHCP-SERV OF PT,EA 15 MIN: HCPCS

## 2017-08-03 PROCEDURE — 3331090002 HH PPS REVENUE DEBIT

## 2017-08-03 PROCEDURE — 3331090001 HH PPS REVENUE CREDIT

## 2017-08-04 PROBLEM — T84.89XA OTHER SPECIFIED COMPLICATION OF INTERNAL ORTHOPEDIC PROSTHETIC DEVICES, IMPLANTS AND GRAFTS, INITIAL ENCOUNTER (HCC): Status: ACTIVE | Noted: 2017-08-04

## 2017-08-04 PROBLEM — Z96.641 STATUS POST RIGHT HIP REPLACEMENT: Status: ACTIVE | Noted: 2017-08-04

## 2017-08-04 PROCEDURE — 3331090002 HH PPS REVENUE DEBIT

## 2017-08-04 PROCEDURE — 3331090001 HH PPS REVENUE CREDIT

## 2017-08-05 PROCEDURE — 3331090002 HH PPS REVENUE DEBIT

## 2017-08-05 PROCEDURE — 3331090001 HH PPS REVENUE CREDIT

## 2017-08-06 PROCEDURE — 3331090002 HH PPS REVENUE DEBIT

## 2017-08-06 PROCEDURE — 3331090001 HH PPS REVENUE CREDIT

## 2017-08-07 ENCOUNTER — HOME CARE VISIT (OUTPATIENT)
Dept: HOME HEALTH SERVICES | Facility: HOME HEALTH | Age: 77
End: 2017-08-07
Payer: MEDICARE

## 2017-08-07 VITALS
BODY MASS INDEX: 27.53 KG/M2 | TEMPERATURE: 97.9 F | DIASTOLIC BLOOD PRESSURE: 50 MMHG | HEART RATE: 79 BPM | WEIGHT: 203 LBS | SYSTOLIC BLOOD PRESSURE: 96 MMHG | OXYGEN SATURATION: 97 %

## 2017-08-07 PROCEDURE — 3331090001 HH PPS REVENUE CREDIT

## 2017-08-07 PROCEDURE — 3331090002 HH PPS REVENUE DEBIT

## 2017-08-08 ENCOUNTER — HOME CARE VISIT (OUTPATIENT)
Dept: HOME HEALTH SERVICES | Facility: HOME HEALTH | Age: 77
End: 2017-08-08
Payer: MEDICARE

## 2017-08-08 PROCEDURE — 3331090002 HH PPS REVENUE DEBIT

## 2017-08-08 PROCEDURE — 3331090001 HH PPS REVENUE CREDIT

## 2017-08-09 ENCOUNTER — HOME CARE VISIT (OUTPATIENT)
Dept: HOME HEALTH SERVICES | Facility: HOME HEALTH | Age: 77
End: 2017-08-09
Payer: MEDICARE

## 2017-08-09 ENCOUNTER — HOME CARE VISIT (OUTPATIENT)
Dept: SCHEDULING | Facility: HOME HEALTH | Age: 77
End: 2017-08-09
Payer: MEDICARE

## 2017-08-09 PROCEDURE — 3331090002 HH PPS REVENUE DEBIT

## 2017-08-09 PROCEDURE — 3331090001 HH PPS REVENUE CREDIT

## 2017-08-09 PROCEDURE — G0151 HHCP-SERV OF PT,EA 15 MIN: HCPCS

## 2017-08-10 ENCOUNTER — HOME CARE VISIT (OUTPATIENT)
Dept: HOME HEALTH SERVICES | Facility: HOME HEALTH | Age: 77
End: 2017-08-10
Payer: MEDICARE

## 2017-08-10 PROCEDURE — 3331090001 HH PPS REVENUE CREDIT

## 2017-08-10 PROCEDURE — 3331090002 HH PPS REVENUE DEBIT

## 2017-08-11 VITALS — HEART RATE: 78 BPM | SYSTOLIC BLOOD PRESSURE: 112 MMHG | OXYGEN SATURATION: 97 % | DIASTOLIC BLOOD PRESSURE: 64 MMHG

## 2017-08-11 PROCEDURE — 3331090002 HH PPS REVENUE DEBIT

## 2017-08-11 PROCEDURE — 3331090001 HH PPS REVENUE CREDIT

## 2017-08-12 PROCEDURE — 3331090001 HH PPS REVENUE CREDIT

## 2017-08-12 PROCEDURE — 3331090002 HH PPS REVENUE DEBIT

## 2017-08-13 PROCEDURE — 3331090002 HH PPS REVENUE DEBIT

## 2017-08-13 PROCEDURE — 3331090001 HH PPS REVENUE CREDIT

## 2017-08-14 PROCEDURE — 3331090002 HH PPS REVENUE DEBIT

## 2017-08-14 PROCEDURE — 3331090001 HH PPS REVENUE CREDIT

## 2017-08-15 PROCEDURE — 3331090002 HH PPS REVENUE DEBIT

## 2017-08-15 PROCEDURE — 3331090001 HH PPS REVENUE CREDIT

## 2017-08-16 ENCOUNTER — HOME CARE VISIT (OUTPATIENT)
Dept: SCHEDULING | Facility: HOME HEALTH | Age: 77
End: 2017-08-16
Payer: MEDICARE

## 2017-08-16 VITALS
HEART RATE: 82 BPM | DIASTOLIC BLOOD PRESSURE: 70 MMHG | RESPIRATION RATE: 18 BRPM | OXYGEN SATURATION: 98 % | TEMPERATURE: 98.1 F | SYSTOLIC BLOOD PRESSURE: 122 MMHG

## 2017-08-16 PROCEDURE — G0299 HHS/HOSPICE OF RN EA 15 MIN: HCPCS

## 2017-08-16 PROCEDURE — 3331090001 HH PPS REVENUE CREDIT

## 2017-08-16 PROCEDURE — G0151 HHCP-SERV OF PT,EA 15 MIN: HCPCS

## 2017-08-16 PROCEDURE — 3331090002 HH PPS REVENUE DEBIT

## 2017-08-17 VITALS
DIASTOLIC BLOOD PRESSURE: 60 MMHG | TEMPERATURE: 98.4 F | SYSTOLIC BLOOD PRESSURE: 110 MMHG | OXYGEN SATURATION: 98 % | HEART RATE: 91 BPM

## 2017-08-17 PROCEDURE — 3331090002 HH PPS REVENUE DEBIT

## 2017-08-17 PROCEDURE — 3331090001 HH PPS REVENUE CREDIT

## 2017-08-18 PROCEDURE — 3331090002 HH PPS REVENUE DEBIT

## 2017-08-18 PROCEDURE — 3331090001 HH PPS REVENUE CREDIT

## 2017-08-19 PROCEDURE — 3331090001 HH PPS REVENUE CREDIT

## 2017-08-19 PROCEDURE — 3331090002 HH PPS REVENUE DEBIT

## 2017-08-20 PROCEDURE — 3331090002 HH PPS REVENUE DEBIT

## 2017-08-20 PROCEDURE — 3331090001 HH PPS REVENUE CREDIT

## 2017-08-21 PROCEDURE — 3331090001 HH PPS REVENUE CREDIT

## 2017-08-21 PROCEDURE — 3331090002 HH PPS REVENUE DEBIT

## 2017-08-22 PROCEDURE — 3331090002 HH PPS REVENUE DEBIT

## 2017-08-22 PROCEDURE — 3331090001 HH PPS REVENUE CREDIT
